# Patient Record
Sex: FEMALE | Race: WHITE | Employment: OTHER | URBAN - NONMETROPOLITAN AREA
[De-identification: names, ages, dates, MRNs, and addresses within clinical notes are randomized per-mention and may not be internally consistent; named-entity substitution may affect disease eponyms.]

---

## 2017-02-04 DIAGNOSIS — J30.9 ALLERGIC RHINITIS: ICD-10-CM

## 2017-02-06 RX ORDER — MONTELUKAST SODIUM 10 MG/1
TABLET ORAL
Qty: 90 TABLET | Refills: 3 | Status: SHIPPED | OUTPATIENT
Start: 2017-02-06 | End: 2018-01-13 | Stop reason: SDUPTHER

## 2017-02-14 ENCOUNTER — OFFICE VISIT (OUTPATIENT)
Dept: PRIMARY CARE CLINIC | Age: 62
End: 2017-02-14

## 2017-02-14 VITALS
DIASTOLIC BLOOD PRESSURE: 78 MMHG | BODY MASS INDEX: 32.3 KG/M2 | OXYGEN SATURATION: 98 % | HEIGHT: 66 IN | WEIGHT: 201 LBS | SYSTOLIC BLOOD PRESSURE: 116 MMHG | HEART RATE: 65 BPM | TEMPERATURE: 97.6 F

## 2017-02-14 DIAGNOSIS — B34.9 VIRAL ILLNESS: Primary | ICD-10-CM

## 2017-02-14 DIAGNOSIS — R11.0 NAUSEA: ICD-10-CM

## 2017-02-14 DIAGNOSIS — R05.9 COUGH: ICD-10-CM

## 2017-02-14 DIAGNOSIS — R50.9 FEVER AND CHILLS: ICD-10-CM

## 2017-02-14 LAB
INFLUENZA A ANTIBODY: NORMAL
INFLUENZA B ANTIBODY: NORMAL

## 2017-02-14 PROCEDURE — 99213 OFFICE O/P EST LOW 20 MIN: CPT | Performed by: FAMILY MEDICINE

## 2017-02-14 PROCEDURE — 87804 INFLUENZA ASSAY W/OPTIC: CPT | Performed by: FAMILY MEDICINE

## 2017-02-14 RX ORDER — ONDANSETRON 4 MG/1
4 TABLET, ORALLY DISINTEGRATING ORAL EVERY 8 HOURS PRN
Qty: 15 TABLET | Refills: 0 | Status: SHIPPED | OUTPATIENT
Start: 2017-02-14 | End: 2017-03-02 | Stop reason: ALTCHOICE

## 2017-02-14 RX ORDER — BENZONATATE 100 MG/1
100 CAPSULE ORAL EVERY 8 HOURS PRN
Qty: 30 CAPSULE | Refills: 0 | Status: SHIPPED | OUTPATIENT
Start: 2017-02-14 | End: 2017-03-02 | Stop reason: ALTCHOICE

## 2017-02-14 ASSESSMENT — ENCOUNTER SYMPTOMS
SINUS PRESSURE: 0
VOMITING: 1
SHORTNESS OF BREATH: 0
COUGH: 1
SORE THROAT: 1
WHEEZING: 0
DIARRHEA: 1
RHINORRHEA: 1
NAUSEA: 1

## 2017-03-02 ENCOUNTER — TELEPHONE (OUTPATIENT)
Dept: FAMILY MEDICINE CLINIC | Age: 62
End: 2017-03-02

## 2017-03-02 ENCOUNTER — OFFICE VISIT (OUTPATIENT)
Dept: ONCOLOGY | Age: 62
End: 2017-03-02

## 2017-03-02 VITALS
BODY MASS INDEX: 33.43 KG/M2 | HEART RATE: 80 BPM | WEIGHT: 208 LBS | SYSTOLIC BLOOD PRESSURE: 118 MMHG | TEMPERATURE: 98.1 F | DIASTOLIC BLOOD PRESSURE: 68 MMHG | HEIGHT: 66 IN

## 2017-03-02 DIAGNOSIS — C50.511 MALIGNANT NEOPLASM OF LOWER-OUTER QUADRANT OF RIGHT FEMALE BREAST (HCC): Primary | ICD-10-CM

## 2017-03-02 PROCEDURE — 99214 OFFICE O/P EST MOD 30 MIN: CPT | Performed by: INTERNAL MEDICINE

## 2017-03-02 RX ORDER — PREDNISONE 20 MG/1
40 TABLET ORAL DAILY
Qty: 10 TABLET | Refills: 0 | Status: SHIPPED | OUTPATIENT
Start: 2017-03-02 | End: 2017-03-07

## 2017-03-02 RX ORDER — LORATADINE 10 MG/1
10 TABLET ORAL DAILY
Qty: 30 TABLET | Refills: 0 | Status: SHIPPED | OUTPATIENT
Start: 2017-03-02 | End: 2017-05-09

## 2017-03-02 RX ORDER — FLUTICASONE PROPIONATE 50 MCG
1 SPRAY, SUSPENSION (ML) NASAL 2 TIMES DAILY
Qty: 1 BOTTLE | Refills: 0 | Status: SHIPPED | OUTPATIENT
Start: 2017-03-02 | End: 2017-10-23 | Stop reason: ALTCHOICE

## 2017-03-04 DIAGNOSIS — E78.5 HYPERLIPIDEMIA: ICD-10-CM

## 2017-03-07 RX ORDER — ATORVASTATIN CALCIUM 20 MG/1
TABLET, FILM COATED ORAL
Qty: 90 TABLET | Refills: 3 | Status: SHIPPED | OUTPATIENT
Start: 2017-03-07 | End: 2018-02-27 | Stop reason: SDUPTHER

## 2017-03-07 RX ORDER — LETROZOLE 2.5 MG/1
TABLET, FILM COATED ORAL
Qty: 90 TABLET | Refills: 2 | Status: SHIPPED | OUTPATIENT
Start: 2017-03-07 | End: 2017-11-13 | Stop reason: SDUPTHER

## 2017-03-28 DIAGNOSIS — F41.9 ANXIETY: Primary | ICD-10-CM

## 2017-03-29 RX ORDER — PAROXETINE HYDROCHLORIDE 20 MG/1
TABLET, FILM COATED ORAL
Qty: 90 TABLET | Refills: 3 | Status: SHIPPED | OUTPATIENT
Start: 2017-03-29 | End: 2017-09-21 | Stop reason: SDUPTHER

## 2017-05-09 ENCOUNTER — OFFICE VISIT (OUTPATIENT)
Dept: FAMILY MEDICINE CLINIC | Age: 62
End: 2017-05-09
Payer: COMMERCIAL

## 2017-05-09 ENCOUNTER — NURSE ONLY (OUTPATIENT)
Dept: LAB | Age: 62
End: 2017-05-09
Payer: COMMERCIAL

## 2017-05-09 VITALS
DIASTOLIC BLOOD PRESSURE: 62 MMHG | OXYGEN SATURATION: 95 % | HEART RATE: 72 BPM | WEIGHT: 207 LBS | BODY MASS INDEX: 31.37 KG/M2 | HEIGHT: 68 IN | SYSTOLIC BLOOD PRESSURE: 124 MMHG

## 2017-05-09 DIAGNOSIS — K64.4 RESIDUAL HEMORRHOIDAL SKIN TAGS: ICD-10-CM

## 2017-05-09 DIAGNOSIS — Z13.31 SCREENING FOR DEPRESSION: ICD-10-CM

## 2017-05-09 DIAGNOSIS — D48.5 NEOPLASM OF UNCERTAIN BEHAVIOR OF SKIN: Primary | ICD-10-CM

## 2017-05-09 DIAGNOSIS — K21.9 GASTROESOPHAGEAL REFLUX DISEASE, ESOPHAGITIS PRESENCE NOT SPECIFIED: ICD-10-CM

## 2017-05-09 DIAGNOSIS — Z23 NEED FOR TDAP VACCINATION: ICD-10-CM

## 2017-05-09 DIAGNOSIS — C50.511 MALIGNANT NEOPLASM OF LOWER-OUTER QUADRANT OF RIGHT FEMALE BREAST (HCC): ICD-10-CM

## 2017-05-09 DIAGNOSIS — E78.5 HYPERLIPIDEMIA, UNSPECIFIED HYPERLIPIDEMIA TYPE: ICD-10-CM

## 2017-05-09 PROBLEM — H40.1190 PRIMARY OPEN ANGLE GLAUCOMA: Status: ACTIVE | Noted: 2017-01-10

## 2017-05-09 PROBLEM — H25.10 NUCLEAR SCLEROTIC CATARACT: Status: ACTIVE | Noted: 2017-01-10

## 2017-05-09 PROCEDURE — 99214 OFFICE O/P EST MOD 30 MIN: CPT | Performed by: FAMILY MEDICINE

## 2017-05-09 PROCEDURE — 17003 DESTRUCT PREMALG LES 2-14: CPT | Performed by: FAMILY MEDICINE

## 2017-05-09 PROCEDURE — 90715 TDAP VACCINE 7 YRS/> IM: CPT | Performed by: FAMILY MEDICINE

## 2017-05-09 PROCEDURE — 90471 IMMUNIZATION ADMIN: CPT | Performed by: FAMILY MEDICINE

## 2017-05-09 PROCEDURE — G0444 DEPRESSION SCREEN ANNUAL: HCPCS | Performed by: FAMILY MEDICINE

## 2017-05-09 PROCEDURE — 17000 DESTRUCT PREMALG LESION: CPT | Performed by: FAMILY MEDICINE

## 2017-05-09 ASSESSMENT — ENCOUNTER SYMPTOMS
SHORTNESS OF BREATH: 0
NAUSEA: 0
VOMITING: 0
BLOOD IN STOOL: 0
CONSTIPATION: 0
COUGH: 1
DIARRHEA: 1

## 2017-05-09 ASSESSMENT — PATIENT HEALTH QUESTIONNAIRE - PHQ9
SUM OF ALL RESPONSES TO PHQ9 QUESTIONS 1 & 2: 1
SUM OF ALL RESPONSES TO PHQ QUESTIONS 1-9: 1
1. LITTLE INTEREST OR PLEASURE IN DOING THINGS: 1
2. FEELING DOWN, DEPRESSED OR HOPELESS: 0

## 2017-06-03 DIAGNOSIS — E78.5 HYPERLIPIDEMIA, UNSPECIFIED HYPERLIPIDEMIA TYPE: ICD-10-CM

## 2017-06-05 RX ORDER — FENOFIBRATE 145 MG/1
TABLET, COATED ORAL
Qty: 90 TABLET | Refills: 1 | Status: SHIPPED | OUTPATIENT
Start: 2017-06-05 | End: 2017-12-22 | Stop reason: SDUPTHER

## 2017-06-18 ENCOUNTER — TELEPHONE (OUTPATIENT)
Dept: GENERAL RADIOLOGY | Age: 62
End: 2017-06-18

## 2017-06-18 DIAGNOSIS — R92.8 ABNORMAL MAMMOGRAM: Primary | ICD-10-CM

## 2017-06-27 ENCOUNTER — OFFICE VISIT (OUTPATIENT)
Dept: ONCOLOGY | Age: 62
End: 2017-06-27
Payer: COMMERCIAL

## 2017-06-27 VITALS
TEMPERATURE: 97.7 F | HEART RATE: 72 BPM | DIASTOLIC BLOOD PRESSURE: 62 MMHG | SYSTOLIC BLOOD PRESSURE: 98 MMHG | WEIGHT: 205 LBS | HEIGHT: 66 IN | BODY MASS INDEX: 32.95 KG/M2

## 2017-06-27 DIAGNOSIS — C50.511 MALIGNANT NEOPLASM OF LOWER-OUTER QUADRANT OF RIGHT FEMALE BREAST (HCC): Primary | ICD-10-CM

## 2017-06-27 PROCEDURE — 99214 OFFICE O/P EST MOD 30 MIN: CPT | Performed by: INTERNAL MEDICINE

## 2017-09-21 RX ORDER — PAROXETINE HYDROCHLORIDE 20 MG/1
20 TABLET, FILM COATED ORAL EVERY MORNING
Qty: 90 TABLET | Refills: 3 | Status: SHIPPED | OUTPATIENT
Start: 2017-09-21 | End: 2018-09-01 | Stop reason: SDUPTHER

## 2017-10-23 ENCOUNTER — HOSPITAL ENCOUNTER (OUTPATIENT)
Dept: LAB | Age: 62
Setting detail: SPECIMEN
Discharge: HOME | End: 2017-10-23
Payer: COMMERCIAL

## 2017-10-23 ENCOUNTER — OFFICE VISIT (OUTPATIENT)
Dept: ONCOLOGY | Age: 62
End: 2017-10-23
Payer: COMMERCIAL

## 2017-10-23 VITALS
TEMPERATURE: 98.2 F | BODY MASS INDEX: 33.59 KG/M2 | DIASTOLIC BLOOD PRESSURE: 64 MMHG | HEIGHT: 66 IN | WEIGHT: 209 LBS | SYSTOLIC BLOOD PRESSURE: 115 MMHG | RESPIRATION RATE: 16 BRPM

## 2017-10-23 DIAGNOSIS — C50.511 MALIGNANT NEOPLASM OF LOWER-OUTER QUADRANT OF RIGHT BREAST OF FEMALE, ESTROGEN RECEPTOR POSITIVE (HCC): Primary | ICD-10-CM

## 2017-10-23 DIAGNOSIS — Z17.0 MALIGNANT NEOPLASM OF LOWER-OUTER QUADRANT OF RIGHT BREAST OF FEMALE, ESTROGEN RECEPTOR POSITIVE (HCC): Primary | ICD-10-CM

## 2017-10-23 DIAGNOSIS — C50.511 MALIGNANT NEOPLASM OF LOWER-OUTER QUADRANT OF RIGHT FEMALE BREAST (HCC): ICD-10-CM

## 2017-10-23 LAB
ABSOLUTE EOS #: 0.2 K/UL (ref 0–0.4)
ABSOLUTE IMMATURE GRANULOCYTE: NORMAL K/UL (ref 0–0.3)
ABSOLUTE LYMPH #: 1.8 K/UL (ref 1–4.8)
ABSOLUTE MONO #: 0.5 K/UL (ref 0.1–1.2)
ALBUMIN SERPL-MCNC: 4.3 G/DL (ref 3.5–5.2)
ALBUMIN/GLOBULIN RATIO: 1.6 (ref 1–2.5)
ALP BLD-CCNC: 116 U/L (ref 35–104)
ALT SERPL-CCNC: 15 U/L (ref 5–33)
ANION GAP SERPL CALCULATED.3IONS-SCNC: 13 MMOL/L (ref 9–17)
AST SERPL-CCNC: 18 U/L
BASOPHILS # BLD: 1 % (ref 0–1)
BASOPHILS ABSOLUTE: 0 K/UL (ref 0–0.2)
BILIRUB SERPL-MCNC: 0.35 MG/DL (ref 0.3–1.2)
BUN BLDV-MCNC: 12 MG/DL (ref 8–23)
BUN/CREAT BLD: 16 (ref 9–20)
CALCIUM SERPL-MCNC: 9.7 MG/DL (ref 8.6–10.4)
CHLORIDE BLD-SCNC: 104 MMOL/L (ref 98–107)
CO2: 27 MMOL/L (ref 20–31)
CREAT SERPL-MCNC: 0.75 MG/DL (ref 0.5–0.9)
DIFFERENTIAL TYPE: NORMAL
EOSINOPHILS RELATIVE PERCENT: 5 % (ref 1–7)
GFR AFRICAN AMERICAN: >60 ML/MIN
GFR NON-AFRICAN AMERICAN: >60 ML/MIN
GFR SERPL CREATININE-BSD FRML MDRD: ABNORMAL ML/MIN/{1.73_M2}
GFR SERPL CREATININE-BSD FRML MDRD: ABNORMAL ML/MIN/{1.73_M2}
GLUCOSE BLD-MCNC: 103 MG/DL (ref 70–99)
HCT VFR BLD CALC: 37.2 % (ref 36–46)
HEMOGLOBIN: 12.4 G/DL (ref 12–16)
IMMATURE GRANULOCYTES: NORMAL %
LACTATE DEHYDROGENASE: 166 U/L (ref 135–214)
LYMPHOCYTES # BLD: 37 % (ref 16–46)
MCH RBC QN AUTO: 28.5 PG (ref 26–34)
MCHC RBC AUTO-ENTMCNC: 33.3 G/DL (ref 31–37)
MCV RBC AUTO: 85.6 FL (ref 80–100)
MONOCYTES # BLD: 10 % (ref 4–11)
PDW BLD-RTO: 13.8 % (ref 11–14.5)
PLATELET # BLD: 252 K/UL (ref 140–450)
PLATELET ESTIMATE: NORMAL
PMV BLD AUTO: 7.7 FL (ref 6–12)
POTASSIUM SERPL-SCNC: 3.7 MMOL/L (ref 3.7–5.3)
RBC # BLD: 4.35 M/UL (ref 4–5.2)
RBC # BLD: NORMAL 10*6/UL
SEG NEUTROPHILS: 47 % (ref 43–77)
SEGMENTED NEUTROPHILS ABSOLUTE COUNT: 2.3 K/UL (ref 1.8–7.7)
SODIUM BLD-SCNC: 144 MMOL/L (ref 135–144)
TOTAL PROTEIN: 7 G/DL (ref 6.4–8.3)
WBC # BLD: 4.8 K/UL (ref 3.5–11)
WBC # BLD: NORMAL 10*3/UL

## 2017-10-23 PROCEDURE — 83615 LACTATE (LD) (LDH) ENZYME: CPT

## 2017-10-23 PROCEDURE — 99214 OFFICE O/P EST MOD 30 MIN: CPT | Performed by: INTERNAL MEDICINE

## 2017-10-23 PROCEDURE — 80053 COMPREHEN METABOLIC PANEL: CPT

## 2017-10-23 PROCEDURE — 85025 COMPLETE CBC W/AUTO DIFF WBC: CPT

## 2017-10-23 PROCEDURE — 36415 COLL VENOUS BLD VENIPUNCTURE: CPT

## 2017-10-23 RX ORDER — ATORVASTATIN CALCIUM 10 MG/1
10 TABLET, FILM COATED ORAL
COMMUNITY
End: 2017-10-23 | Stop reason: DRUGHIGH

## 2017-10-23 RX ORDER — LATANOPROST 50 UG/ML
SOLUTION/ DROPS OPHTHALMIC
COMMUNITY
Start: 2017-07-11 | End: 2018-05-14 | Stop reason: ALTCHOICE

## 2017-10-23 RX ORDER — FENOFIBRATE 145 MG/1
145 TABLET, COATED ORAL
COMMUNITY
End: 2017-10-23 | Stop reason: SDUPTHER

## 2017-10-23 RX ORDER — MONTELUKAST SODIUM 10 MG/1
10 TABLET ORAL
COMMUNITY
End: 2017-10-23 | Stop reason: SDUPTHER

## 2017-10-23 RX ORDER — CETIRIZINE HYDROCHLORIDE 10 MG/1
5 TABLET ORAL
COMMUNITY
End: 2017-10-23 | Stop reason: SDUPTHER

## 2017-10-23 NOTE — PROGRESS NOTES
Patient ID: Jimmie Peralta, [birth year], [id number], 57 y.o. Diagnosis:   Right-sided breast cancer,T1cN0, status post lumpectomy with sentinel lymph node biopsy in December 2015  Adjuvant radiation therapy completed in February 2016  Started on Femara March 2016  HISTORY OF PRESENT ILLNESS:    Oncologic History: This is a 58-year-old female who was diagnosed with right-sided breast cancer in November 2015 and subsequently had lumpectomy with sentinel lymph node biopsy in December 2015. The pathology was positive for well-differentiated ductal carcinoma measuring 1.6 cm with low-grade ductal carcinoma in situ. There was also atypical lobular hyperplasia and lobular carcinoma in situ noted. Final surgical pathology showed P T1cp N0 disease, ER/NM positive and HER-2/александр negative. Patient completed radiation therapy in February 2016 and started on endocrine therapy with Femara in March 2016. Interval history:  Patient is returning for follow-up visit. She started Femara well. She reports that during summertime hot flashes are worse but now they're getting better. She denied any new lumps or bumps in her breast.  No unintentional weight loss, new bone pain or back pain. During this visit patient's allergy, social, medical, surgical history and medications were reviewed and updated. Past Medical History:   Diagnosis Date    Cancer of right female breast ([address]     infiltrating ductal carcinoma T10 N0 M0 ER positive    Depression     GERD (gastroesophageal reflux disease)     Glaucoma     Dr Dona Garcia History of gastroenteritis 1994    Hospitalized for gastroenteritis and dehydration.  Hyperlipidemia     Hypertension     Controlled with diet and exercise.  OA (osteoarthritis)     Seasonal allergies      Past Surgical History:   Procedure Laterality Date    BREAST SURGERY Right 12/14/2015    lumpectomy with sentinal node biopsy    BUNIONECTOMY      Remote.     COLONOSCOPY 1/28/2013    Grade 1 internal hemorrhoids. Otherwise normal.    DILATION AND CURETTAGE OF UTERUS  12/29/2005    Hysteroscopy.  HEMORRHOID SURGERY      rubber banding by Dr. Rose Denson 10/28/16    KNEE ARTHROSCOPY Right 7/26/2002    Partial medial meniscectomy.  MENISCECTOMY Right 7/2002    Medial.    OR SONO GUIDE NEEDLE BIOPSY Right 11/20/2015    Right breast    TOE OSTEOTOMY Right 2009    Metatarsal osteotomy of right 1st digit.  TOTAL KNEE ARTHROPLASTY Left 11/9/2011    TOTAL KNEE ARTHROPLASTY Right 12/3/2010    UPPER GASTROINTESTINAL ENDOSCOPY  2/2004       No Known Allergies    Current Outpatient Prescriptions   Medication Sig Dispense Refill    latanoprost (XALATAN) 0.005 % ophthalmic solution Apply to eye      CALCIUM CITRATE-VITAMIN D PO Take 4 tablets by mouth daily      PARoxetine (PAXIL) 20 MG tablet Take 1 tablet by mouth every morning 90 tablet 3    fenofibrate (TRICOR) 145 MG tablet TAKE 1 TABLET DAILY 90 tablet 1    atorvastatin (LIPITOR) 20 MG tablet TAKE 1 TABLET DAILY 90 tablet 3    letrozole (FEMARA) 2.5 MG tablet TAKE 1 TABLET DAILY 90 tablet 2    montelukast (SINGULAIR) 10 MG tablet TAKE 1 TABLET NIGHTLY 90 tablet 3    timolol (TIMOPTIC) 0.5 % ophthalmic solution       omeprazole (PRILOSEC) 20 MG capsule Take 1 capsule by mouth daily. 90 capsule 1    cetirizine (ZYRTEC) 10 MG tablet Take 10 mg by mouth daily.  hydrocortisone (ANUSOL-HC) 2.5 % rectal cream Place rectally 2 times daily x 10 days, then as needed. 1 Tube 2     No current facility-administered medications for this visit. Social History     Social History    Marital status:      Spouse name: N/A    Number of children: N/A    Years of education: N/A     Occupational History    Not on file.      Social History Main Topics    Smoking status: Never Smoker    Smokeless tobacco: Never Used    Alcohol use No    Drug use: No    Sexual activity: Not on file     Other Topics Concern    Not on file     Social History Narrative    No narrative on file       Family History   Problem Relation Age of Onset    Dementia Mother     Breast Cancer Sister      REVIEW OF SYSTEM:   Constitutional: No fever or chills. No night sweats, no weight loss   Eyes: No eye discharge, double vision, or eye pain   HEENT: negative for sore mouth, sore throat, hoarseness and voice change   Respiratory: negative for cough , sputum, dyspnea, wheezing, hemoptysis, chest pain   Cardiovascular: negative for chest pain, dyspnea, palpitations, orthopnea, PND   Gastrointestinal: negative for nausea, vomiting, diarrhea, constipation, abdominal pain, Dysphagia, hematemesis and hematochezia   Genitourinary: negative for frequency, dysuria, nocturia, urinary incontinence, and hematuria   Integument: negative for rash, skin lesions, bruises.    Hematologic/Lymphatic: negative for easy bruising, bleeding, lymphadenopathy, petechiae and swelling/edema   Endocrine: negative for heat or cold intolerance, tremor, weight changes, change in bowel habits and hair loss   Musculoskeletal: negative for myalgias, arthralgias, pain, joint swelling,and bone pain   Neurological: negative for headaches, dizziness, seizures, weakness, numbness     OBJECTIVE:         Vitals:    10/23/17 0923   BP: 115/64   Resp: 16   Temp: 98.2 °F (36.8 °C)       PHYSICAL EXAM:   General appearance - well appearing, no in pain or distress   Mental status - alert and cooperative   Eyes - pupils equal and reactive, extraocular eye movements intact   Ears - bilateral TM's and external ear canals normal   Mouth - mucous membranes moist, pharynx normal without lesions   Neck - supple, no significant adenopathy   Lymphatics - no palpable lymphadenopathy, no hepatosplenomegaly   Chest - clear to auscultation, no wheezes, rales or rhonchi, symmetric air entry   Heart - normal rate, regular rhythm, normal S1, S2, no murmurs, rubs, clicks or gallops   Abdomen - soft, nontender, nondistended, no masses or organomegaly   Neurological - alert, oriented, normal speech, no focal findings or movement disorder noted   Musculoskeletal - no joint tenderness, deformity or swelling   Extremities - peripheral pulses normal, no pedal edema, no clubbing or cyanosis   Skin - normal coloration and turgor, no rashes, no suspicious skin lesions noted ,      LABORATORY DATA:     Lab Results   Component Value Date    WBC 4.8 10/23/2017    HGB 12.4 10/23/2017    HCT 37.2 10/23/2017    MCV 85.6 10/23/2017     10/23/2017    LYMPHOPCT 37 10/23/2017    RBC 4.35 10/23/2017    MCH 28.5 10/23/2017    MCHC 33.3 10/23/2017    RDW 13.8 10/23/2017    MONOPCT 10 10/23/2017    BASOPCT 1 10/23/2017    NEUTROABS 2.30 10/23/2017    LYMPHSABS 1.80 10/23/2017    MONOSABS 0.50 10/23/2017    EOSABS 0.20 10/23/2017    BASOSABS 0.00 10/23/2017         Chemistry        Component Value Date/Time     (H) 06/27/2017 0907    K 3.5 (L) 06/27/2017 0907     06/27/2017 0907    CO2 27 06/27/2017 0907    BUN 12 06/27/2017 0907    CREATININE 0.82 06/27/2017 0907        Component Value Date/Time    CALCIUM 9.8 06/27/2017 0907    ALKPHOS 124 (H) 06/27/2017 0907    AST 19 06/27/2017 0907    ALT 17 06/27/2017 0907    BILITOT 0.29 (L) 06/27/2017 0907        PATHOLOGY DATA:   Collected: 12/14/2015   Received: 12/15/2015   Reported: 12/16/2015 14:25     -- Diagnosis --   1.  RIGHT BREAST, EXCISIONAL LUMPECTOMY WITH WIRE LOCALIZATION:       -  INVASIVE, WELL-DIFFERENTIATED DUCTAL CARCINOMA (1.6 CM).  SEE     COMMENT. -  FOCAL LOW GRADE DUCTAL CARCINOMA IN SITU IS ASSOCIATED WITH THE     INVASIVE DUCTAL CARCINOMA. -  ATYPICAL LOBULAR HYPERPLASIA AND LOBULAR CARCINOMA IN SITU. -  THE SURGICAL MARGINS ARE NEGATIVE FOR INVASIVE DUCTAL     CARCINOMA AND DCIS (THE CLOSEST MARGIN IS 0.5 CM FROM THE TUMOR). 2.  SENTINEL LYMPH NODE, EXCISIONAL BIOPSY:       -  NEGATIVE FOR MALIGNANCY (0/1).      3.  SENTINEL LYMPH NODE, EXCISIONAL BIOPSY:       -  NEGATIVE FOR MALIGNANCY (0/1). 4.  SENTINEL LYMPH NODE, EXCISIONAL BIOPSY:       -  NEGATIVE FOR MALIGNANCY (0/1). IMAGING DATA:    Digital diagnostic mammogram 6/23/17   Impression   No mammographic evidence of malignancy.       BIRADS:   BIRADS - CATEGORY 2       Benign, no evidence of malignancy.  Diagnostic right breast mammogram in 6   months is recommended to complete the patient's 2 year post lumpectomy   protocol.         ASSESSMENT:    This is a 50-year-old female with right-sided breast cancer status post lumpectomy followed by radiation therapy and currently on adjuvant endocrine therapy since March 2016. She is tolerating Femara very well except mild hot flashes which seems to be getting better. At this point there is no evidence of recurrence. I discussed the risk-benefit and she will continue endocrine therapy for 5-10 years. She will be scheduled for right sided mammogram in February 2018.      PLAN:   She is scheduled for right sided mammogram in February  Return to clinic in February with CBC and CMP  Continue Femara      Kris Palacio MD  Hematology/Oncology

## 2017-10-25 ENCOUNTER — OFFICE VISIT (OUTPATIENT)
Dept: FAMILY MEDICINE CLINIC | Age: 62
End: 2017-10-25
Payer: COMMERCIAL

## 2017-10-25 VITALS
HEART RATE: 74 BPM | WEIGHT: 209 LBS | DIASTOLIC BLOOD PRESSURE: 68 MMHG | SYSTOLIC BLOOD PRESSURE: 102 MMHG | OXYGEN SATURATION: 96 % | TEMPERATURE: 98.2 F | HEIGHT: 66 IN | BODY MASS INDEX: 33.59 KG/M2

## 2017-10-25 DIAGNOSIS — S93.492A SPRAIN OF OTHER LIGAMENT OF LEFT ANKLE, INITIAL ENCOUNTER: Primary | ICD-10-CM

## 2017-10-25 PROCEDURE — L4350 ANKLE CONTROL ORTHO PRE OTS: HCPCS | Performed by: FAMILY MEDICINE

## 2017-10-25 PROCEDURE — 99213 OFFICE O/P EST LOW 20 MIN: CPT | Performed by: FAMILY MEDICINE

## 2017-10-25 NOTE — PROGRESS NOTES
Ivan Hinkle was told to start with pedal pumps and then write the alphabet with her foot. Ivan Hinkle was also given additional exercises through the patient instructions. Return if symptoms worsen or fail to improve. Patient given educational materials - see patient instructions. Discussed use, benefit, and side effects of prescribed medications. All patient questions answered. Pt voiced understanding. Reviewed health maintenance. Instructed to continue current medications, diet and exercise. Patient agreed with treatment plan. Follow up as directed.      Electronically signed by Juanito Pelaez MD on 10/25/2017 at 12:13 PM

## 2017-10-25 NOTE — PATIENT INSTRUCTIONS
hand to hold on to a chair, counter, or wall. 2. Standing on the leg with your affected ankle, keep that knee straight. Try to balance on that leg for up to 30 seconds. Then rest for up to 10 seconds. 3. Repeat 6 to 8 times. 4. When you can balance on your affected leg for 30 seconds with your eyes open, try to balance on it with your eyes closed. When you can do this exercise with your eyes closed for 30 seconds and with ease and no pain, try standing on a pillow or piece of foam, and repeat steps 1 through 4. Follow-up care is a key part of your treatment and safety. Be sure to make and go to all appointments, and call your doctor if you are having problems. It's also a good idea to know your test results and keep a list of the medicines you take. Where can you learn more? Go to https://[email or web address]. org and sign in to your [employer] account. Enter Ananya Barrios in the Mobile [employer] box to learn more about \"Ankle Sprain: Rehab Exercises. \"     If you do not have an account, please click on the \"Sign Up Now\" link. Current as of: March 21, 2017  Content Version: 11.3  © 3657-7822 [employer]. Care instructions adapted under license by [facility] ([facility]). If you have questions about a medical condition or this instruction, always ask your healthcare professional. [place] [address] any warranty or liability for your use of this information.

## 2017-11-14 ENCOUNTER — OFFICE VISIT (OUTPATIENT)
Dept: FAMILY MEDICINE CLINIC | Age: 62
End: 2017-11-14
Payer: COMMERCIAL

## 2017-11-14 VITALS
OXYGEN SATURATION: 98 % | SYSTOLIC BLOOD PRESSURE: 126 MMHG | BODY MASS INDEX: 33.25 KG/M2 | DIASTOLIC BLOOD PRESSURE: 66 MMHG | HEART RATE: 64 BPM | WEIGHT: 206 LBS

## 2017-11-14 DIAGNOSIS — Z17.0 MALIGNANT NEOPLASM OF LOWER-OUTER QUADRANT OF RIGHT BREAST OF FEMALE, ESTROGEN RECEPTOR POSITIVE (HCC): ICD-10-CM

## 2017-11-14 DIAGNOSIS — K64.9 HEMORRHOIDS, UNSPECIFIED HEMORRHOID TYPE: ICD-10-CM

## 2017-11-14 DIAGNOSIS — E78.5 HYPERLIPIDEMIA, UNSPECIFIED HYPERLIPIDEMIA TYPE: Primary | ICD-10-CM

## 2017-11-14 DIAGNOSIS — K21.9 GASTROESOPHAGEAL REFLUX DISEASE, ESOPHAGITIS PRESENCE NOT SPECIFIED: ICD-10-CM

## 2017-11-14 DIAGNOSIS — C50.511 MALIGNANT NEOPLASM OF LOWER-OUTER QUADRANT OF RIGHT BREAST OF FEMALE, ESTROGEN RECEPTOR POSITIVE (HCC): ICD-10-CM

## 2017-11-14 PROCEDURE — 99214 OFFICE O/P EST MOD 30 MIN: CPT | Performed by: FAMILY MEDICINE

## 2017-11-14 RX ORDER — LETROZOLE 2.5 MG/1
TABLET, FILM COATED ORAL
Qty: 90 TABLET | Refills: 1 | Status: SHIPPED | OUTPATIENT
Start: 2017-11-14 | End: 2018-03-24 | Stop reason: SDUPTHER

## 2017-11-14 ASSESSMENT — ENCOUNTER SYMPTOMS
BLOOD IN STOOL: 0
CONSTIPATION: 0
VOMITING: 0
WHEEZING: 0
DIARRHEA: 1
NAUSEA: 0
SHORTNESS OF BREATH: 0

## 2017-11-14 NOTE — PROGRESS NOTES
[facility]  [address], [address]  [phone]      Monae Dempsey is a 57 y.o. female who presents today for her medical conditions/complaints as noted below. Monae Dempsey is c/o of Hyperlipidemia (6 month follow-up) and Gastroesophageal Reflux (6 month follow-up)      HPI:     Pt here today for follow-up of HLD and GERD.     Pt still has intermittent b/l hip pain, R > L (posterior) - worse with repetitive bending and pulling, like with weeding/yardwork. Uses heating pad when it flares up; will use Ibuprofen as needed, if it gets \"really bad\". Also learned some stretches from the chiropractor for her hips, and hoping to start doing more formal exercise again this winter (weights, stationary bike).     BP well-controlled today - 126/66.     Seeing Dr. Shavon Del Valle every 4 months for h/o breast CA. Next appt in 2/2018. No current concerns. Taking Femara 2.5 mg daily - stable on this; Dr. Shavon Del Valle has spoken with her about taking this for 5-10 years total.   Will have R-sided mammogram done in 2/2018.     Taking Lipitor 20 mg daily and Tricor 145 mg daily for HLD - due for next lipid panel in 2/2018. States she is compliant with meds. Still trying to limit her soda, and has cut out all sweet tea; increasing her water intake.       Taking Zyrtec 10 mg daily and Singulair 10 mg nightly for allergic rhinitis - stable on these. Uses Flonase only sparingly as needed when she is sick.     Taking Paxil 20 mg daily for depression - feels this is keeping her mood well-controlled. Compliant with this daily. Denies SI or side effects.     Taking OTC Prilosec 20 mg daily for GERD - stable on this; tried weaning off this in the past, but had fairly immediate return of heartburn.     Seeing Dr. Jose Carlos Smith every 6 months for glaucoma - taking 2 different eye drops currently BID; stable.     Skin lesions that were treated at last OV with cryotherapy have resolved. Pt still having intermittent hemorrhoid symptoms - no recent bleeding. Has more pain/irritation from chafing in the rectal/gluteal area from sweating during her hot flashes and increased moisture in the area. Has been using Anusol cream as needed for itching; also using Goldbond anti-fungal powder on the area and keeping the area more dry. Past Medical History:   Diagnosis Date    Cancer of right female breast ([address]     infiltrating ductal carcinoma T10 N0 M0 ER positive    Depression     GERD (gastroesophageal reflux disease)     Glaucoma     Dr Greg Grider History of gastroenteritis 1994    Hospitalized for gastroenteritis and dehydration.  Hyperlipidemia     Hypertension     Controlled with diet and exercise.  OA (osteoarthritis)     Seasonal allergies       Past Surgical History:   Procedure Laterality Date    BREAST SURGERY Right 12/14/2015    lumpectomy with sentinal node biopsy    BUNIONECTOMY      Remote.  COLONOSCOPY  1/28/2013    Grade 1 internal hemorrhoids. Otherwise normal.    DILATION AND CURETTAGE OF UTERUS  12/29/2005    Hysteroscopy.  HEMORRHOID SURGERY      rubber banding by Dr. Kinsey Guard 10/28/16    KNEE ARTHROSCOPY Right 7/26/2002    Partial medial meniscectomy.  MENISCECTOMY Right 7/2002    Medial.    OK SONO GUIDE NEEDLE BIOPSY Right 11/20/2015    Right breast    TOE OSTEOTOMY Right 2009    Metatarsal osteotomy of right 1st digit.  TOTAL KNEE ARTHROPLASTY Left 11/9/2011    TOTAL KNEE ARTHROPLASTY Right 12/3/2010    UPPER GASTROINTESTINAL ENDOSCOPY  2/2004     Family History   Problem Relation Age of Onset    Dementia Mother     Breast Cancer Sister      Social History   Substance Use Topics    Smoking status: Never Smoker    Smokeless tobacco: Never Used    Alcohol use No      Current Outpatient Prescriptions   Medication Sig Dispense Refill    hydrocortisone (ANUSOL-HC) 2.5 % rectal cream Place rectally 2 times daily as needed.  1 Tube 2    latanoprost (XALATAN) 0.005 % ophthalmic solution Apply to eye      CALCIUM CITRATE-VITAMIN D PO Take 4 tablets by mouth daily      PARoxetine (PAXIL) 20 MG tablet Take 1 tablet by mouth every morning 90 tablet 3    fenofibrate (TRICOR) 145 MG tablet TAKE 1 TABLET DAILY 90 tablet 1    atorvastatin (LIPITOR) 20 MG tablet TAKE 1 TABLET DAILY 90 tablet 3    montelukast (SINGULAIR) 10 MG tablet TAKE 1 TABLET NIGHTLY 90 tablet 3    timolol (TIMOPTIC) 0.5 % ophthalmic solution       omeprazole (PRILOSEC) 20 MG capsule Take 1 capsule by mouth daily. 90 capsule 1    cetirizine (ZYRTEC) 10 MG tablet Take 10 mg by mouth daily.  letrozole (FEMARA) 2.5 MG tablet TAKE 1 TABLET DAILY 90 tablet 1     No current facility-administered medications for this visit. No Known Allergies    Health Maintenance   Topic Date Due    HIV screen  12/06/1970    Breast cancer screen  06/23/2018    Diabetes screen  03/22/2019    Cervical cancer screen  11/04/2021    Lipid screen  01/17/2022    Colon cancer screen colonoscopy  01/28/2023    DTaP/Tdap/Td vaccine (2 - Td) 05/09/2027    Zostavax vaccine  Completed    Flu vaccine  Completed    Hepatitis C screen  Completed       Subjective:      Review of Systems   Constitutional: Negative for unexpected weight change. HENT: Negative for hearing loss. Eyes: Positive for visual disturbance (has cataract in both eyes, R > L). Respiratory: Negative for shortness of breath and wheezing. Cardiovascular: Negative for chest pain, palpitations and leg swelling. Gastrointestinal: Positive for diarrhea (intermittent). Negative for blood in stool, constipation, nausea and vomiting. Genitourinary: Negative for dysuria and hematuria. Neurological: Negative for dizziness and light-headedness. Psychiatric/Behavioral: Negative for dysphoric mood and suicidal ideas. The patient is not nervous/anxious.         Objective:     Vitals:    11/14/17 1131   BP: 126/66   Site: Left Arm   Position: Sitting   Cuff Size: Large Adult   Pulse: 64   SpO2: 98%   Weight: 206 lb (93.4 kg)     Physical Exam   Constitutional: She is oriented to person, place, and time. She appears well-developed and well-nourished. No distress. HENT:   Head: Normocephalic and atraumatic. Right Ear: Tympanic membrane, external ear and ear canal normal.   Left Ear: Tympanic membrane, external ear and ear canal normal.   Nose: Nose normal.   Mouth/Throat: Oropharynx is clear and moist and mucous membranes are normal. No oropharyngeal exudate. Eyes: Conjunctivae and EOM are normal. Pupils are equal, round, and reactive to light. Neck: Neck supple. Cardiovascular: Normal rate, regular rhythm and normal heart sounds. Pulmonary/Chest: Effort normal and breath sounds normal. No respiratory distress. Abdominal: Soft. Bowel sounds are normal. She exhibits no distension. There is no tenderness. Musculoskeletal: She exhibits no edema. Neurological: She is alert and oriented to person, place, and time. Skin: Skin is warm and dry. Psychiatric: She has a normal mood and affect. Assessment:      1. Hyperlipidemia, unspecified hyperlipidemia type     2. Hemorrhoids, unspecified hemorrhoid type  hydrocortisone (ANUSOL-HC) 2.5 % rectal cream   3. Gastroesophageal reflux disease, esophagitis presence not specified     4. Malignant neoplasm of lower-outer quadrant of right breast of female, estrogen receptor positive ([facility] [address]           Plan:      Return in about 6 months (around 5/14/2018) for Follow-up. Orders Placed This Encounter   Medications    hydrocortisone (ANUSOL-HC) 2.5 % rectal cream     Sig: Place rectally 2 times daily as needed. Dispense:  1 Tube     Refill:  2       Patient given educational materials - see patient instructions. Discussed use, benefit, and side effects of prescribed medications. All patient questions answered. Pt voiced understanding. Reviewed health maintenance.

## 2017-12-22 DIAGNOSIS — E78.5 HYPERLIPIDEMIA, UNSPECIFIED HYPERLIPIDEMIA TYPE: ICD-10-CM

## 2017-12-26 RX ORDER — FENOFIBRATE 145 MG/1
TABLET, COATED ORAL
Qty: 90 TABLET | Refills: 1 | Status: SHIPPED | OUTPATIENT
Start: 2017-12-26 | End: 2018-06-30 | Stop reason: SDUPTHER

## 2017-12-26 NOTE — TELEPHONE ENCOUNTER
Last Appt:  11/14/2017  Next Appt:   5/14/2018  Med verified in Epic    Please advise for KB's absence.

## 2018-01-13 DIAGNOSIS — J30.9 ALLERGIC RHINITIS: ICD-10-CM

## 2018-01-13 DIAGNOSIS — E78.5 HYPERLIPIDEMIA: ICD-10-CM

## 2018-01-15 RX ORDER — MONTELUKAST SODIUM 10 MG/1
TABLET ORAL
Qty: 90 TABLET | Refills: 3 | Status: SHIPPED | OUTPATIENT
Start: 2018-01-15 | End: 2019-04-19

## 2018-01-15 RX ORDER — ATORVASTATIN CALCIUM 20 MG/1
TABLET, FILM COATED ORAL
Qty: 90 TABLET | Refills: 1 | OUTPATIENT
Start: 2018-01-15

## 2018-01-15 NOTE — TELEPHONE ENCOUNTER
Last appt 11-14-17. Next appt 5-14-18. Coming in 2-26-18 for oncology labs. Note added to draw for lipids also.

## 2018-01-24 ENCOUNTER — TELEPHONE (OUTPATIENT)
Dept: SURGERY | Age: 63
End: 2018-01-24

## 2018-01-24 DIAGNOSIS — C50.511 MALIGNANT NEOPLASM OF LOWER-OUTER QUADRANT OF RIGHT FEMALE BREAST, UNSPECIFIED ESTROGEN RECEPTOR STATUS (HCC): Primary | ICD-10-CM

## 2018-01-31 ENCOUNTER — HOSPITAL ENCOUNTER (OUTPATIENT)
Dept: MAMMOGRAPHY | Age: 63
Discharge: HOME | End: 2018-02-02
Payer: COMMERCIAL

## 2018-01-31 DIAGNOSIS — C50.511 MALIGNANT NEOPLASM OF LOWER-OUTER QUADRANT OF RIGHT FEMALE BREAST, UNSPECIFIED ESTROGEN RECEPTOR STATUS (HCC): ICD-10-CM

## 2018-01-31 PROCEDURE — G0279 TOMOSYNTHESIS, MAMMO: HCPCS

## 2018-02-07 ENCOUNTER — HOSPITAL ENCOUNTER (OUTPATIENT)
Dept: LAB | Age: 63
Setting detail: SPECIMEN
Discharge: HOME | End: 2018-02-07
Payer: COMMERCIAL

## 2018-02-07 ENCOUNTER — OFFICE VISIT (OUTPATIENT)
Dept: FAMILY MEDICINE CLINIC | Age: 63
End: 2018-02-07
Payer: COMMERCIAL

## 2018-02-07 ENCOUNTER — HOSPITAL ENCOUNTER (OUTPATIENT)
Dept: GENERAL RADIOLOGY | Age: 63
Discharge: HOME | End: 2018-02-09
Payer: COMMERCIAL

## 2018-02-07 ENCOUNTER — HOSPITAL ENCOUNTER (OUTPATIENT)
Dept: NON INVASIVE DIAGNOSTICS | Age: 63
Discharge: HOME | End: 2018-02-07
Payer: COMMERCIAL

## 2018-02-07 VITALS
DIASTOLIC BLOOD PRESSURE: 80 MMHG | BODY MASS INDEX: 33.91 KG/M2 | OXYGEN SATURATION: 98 % | HEIGHT: 66 IN | HEART RATE: 76 BPM | SYSTOLIC BLOOD PRESSURE: 128 MMHG | WEIGHT: 211 LBS

## 2018-02-07 DIAGNOSIS — Z01.818 PRE-OP EXAM: ICD-10-CM

## 2018-02-07 DIAGNOSIS — H25.9 SENILE CATARACT OF LEFT EYE, UNSPECIFIED AGE-RELATED CATARACT TYPE: ICD-10-CM

## 2018-02-07 DIAGNOSIS — Z01.818 PRE-OP EXAM: Primary | ICD-10-CM

## 2018-02-07 LAB
-: ABNORMAL
ABSOLUTE EOS #: 0.3 K/UL (ref 0–0.4)
ABSOLUTE IMMATURE GRANULOCYTE: NORMAL K/UL (ref 0–0.3)
ABSOLUTE LYMPH #: 2 K/UL (ref 1–4.8)
ABSOLUTE MONO #: 0.5 K/UL (ref 0.1–1.2)
ALBUMIN SERPL-MCNC: 4.5 G/DL (ref 3.5–5.2)
ALBUMIN/GLOBULIN RATIO: 1.5 (ref 1–2.5)
ALP BLD-CCNC: 104 U/L (ref 35–104)
ALT SERPL-CCNC: 18 U/L (ref 5–33)
AMORPHOUS: ABNORMAL
ANION GAP SERPL CALCULATED.3IONS-SCNC: 12 MMOL/L (ref 9–17)
AST SERPL-CCNC: 21 U/L
BACTERIA: ABNORMAL
BASOPHILS # BLD: 1 % (ref 0–1)
BASOPHILS ABSOLUTE: 0 K/UL (ref 0–0.2)
BILIRUB SERPL-MCNC: 0.3 MG/DL (ref 0.3–1.2)
BILIRUBIN URINE: NEGATIVE
BUN BLDV-MCNC: 10 MG/DL (ref 8–23)
BUN/CREAT BLD: 14 (ref 9–20)
CALCIUM SERPL-MCNC: 10.1 MG/DL (ref 8.6–10.4)
CASTS UA: ABNORMAL /LPF (ref 0–2)
CHLORIDE BLD-SCNC: 103 MMOL/L (ref 98–107)
CO2: 29 MMOL/L (ref 20–31)
COLOR: ABNORMAL
COMMENT UA: ABNORMAL
CREAT SERPL-MCNC: 0.73 MG/DL (ref 0.5–0.9)
CRYSTALS, UA: ABNORMAL /HPF
DIFFERENTIAL TYPE: NORMAL
EKG ATRIAL RATE: 57 BPM
EKG P AXIS: 53 DEGREES
EKG P-R INTERVAL: 146 MS
EKG Q-T INTERVAL: 408 MS
EKG QRS DURATION: 80 MS
EKG QTC CALCULATION (BAZETT): 397 MS
EKG R AXIS: 37 DEGREES
EKG T AXIS: 47 DEGREES
EKG VENTRICULAR RATE: 57 BPM
EOSINOPHILS RELATIVE PERCENT: 4 % (ref 1–7)
EPITHELIAL CELLS UA: ABNORMAL /HPF (ref 0–5)
GFR AFRICAN AMERICAN: >60 ML/MIN
GFR NON-AFRICAN AMERICAN: >60 ML/MIN
GFR SERPL CREATININE-BSD FRML MDRD: NORMAL ML/MIN/{1.73_M2}
GFR SERPL CREATININE-BSD FRML MDRD: NORMAL ML/MIN/{1.73_M2}
GLUCOSE BLD-MCNC: 95 MG/DL (ref 70–99)
GLUCOSE URINE: NEGATIVE
HCT VFR BLD CALC: 39 % (ref 36–46)
HEMOGLOBIN: 12.7 G/DL (ref 12–16)
IMMATURE GRANULOCYTES: NORMAL %
KETONES, URINE: NEGATIVE
LEUKOCYTE ESTERASE, URINE: NEGATIVE
LYMPHOCYTES # BLD: 33 % (ref 16–46)
MCH RBC QN AUTO: 27.6 PG (ref 26–34)
MCHC RBC AUTO-ENTMCNC: 32.7 G/DL (ref 31–37)
MCV RBC AUTO: 84.5 FL (ref 80–100)
MONOCYTES # BLD: 9 % (ref 4–11)
MUCUS: ABNORMAL
NITRITE, URINE: NEGATIVE
NRBC AUTOMATED: NORMAL PER 100 WBC
OTHER OBSERVATIONS UA: ABNORMAL
PDW BLD-RTO: 14.3 % (ref 11–14.5)
PH UA: 6.5 (ref 5–6)
PLATELET # BLD: 299 K/UL (ref 140–450)
PLATELET ESTIMATE: NORMAL
PMV BLD AUTO: 7.8 FL (ref 6–12)
POTASSIUM SERPL-SCNC: 3.8 MMOL/L (ref 3.7–5.3)
PROTEIN UA: NEGATIVE
RBC # BLD: 4.61 M/UL (ref 4–5.2)
RBC # BLD: NORMAL 10*6/UL
RBC UA: ABNORMAL /HPF (ref 0–4)
RENAL EPITHELIAL, UA: ABNORMAL /HPF
SEG NEUTROPHILS: 53 % (ref 43–77)
SEGMENTED NEUTROPHILS ABSOLUTE COUNT: 3.2 K/UL (ref 1.8–7.7)
SODIUM BLD-SCNC: 144 MMOL/L (ref 135–144)
SPECIFIC GRAVITY UA: 1.02 (ref 1.01–1.02)
TOTAL PROTEIN: 7.5 G/DL (ref 6.4–8.3)
TRICHOMONAS: ABNORMAL
TURBIDITY: ABNORMAL
URINE HGB: NEGATIVE
UROBILINOGEN, URINE: NORMAL
WBC # BLD: 6 K/UL (ref 3.5–11)
WBC # BLD: NORMAL 10*3/UL
WBC UA: ABNORMAL /HPF (ref 0–4)
YEAST: ABNORMAL

## 2018-02-07 PROCEDURE — 36415 COLL VENOUS BLD VENIPUNCTURE: CPT

## 2018-02-07 PROCEDURE — 85025 COMPLETE CBC W/AUTO DIFF WBC: CPT

## 2018-02-07 PROCEDURE — 81001 URINALYSIS AUTO W/SCOPE: CPT

## 2018-02-07 PROCEDURE — 99243 OFF/OP CNSLTJ NEW/EST LOW 30: CPT | Performed by: NURSE PRACTITIONER

## 2018-02-07 PROCEDURE — 93005 ELECTROCARDIOGRAM TRACING: CPT

## 2018-02-07 PROCEDURE — 80053 COMPREHEN METABOLIC PANEL: CPT

## 2018-02-07 PROCEDURE — 71046 X-RAY EXAM CHEST 2 VIEWS: CPT

## 2018-02-07 RX ORDER — PREDNISOLONE SODIUM PHOSPHATE 10 MG/ML
SOLUTION/ DROPS OPHTHALMIC
COMMUNITY
Start: 2018-01-29 | End: 2018-05-14 | Stop reason: ALTCHOICE

## 2018-02-07 RX ORDER — LANOLIN ALCOHOL/MO/W.PET/CERES
1 CREAM (GRAM) TOPICAL
COMMUNITY

## 2018-02-07 RX ORDER — KETOROLAC TROMETHAMINE 5 MG/ML
SOLUTION OPHTHALMIC
COMMUNITY
Start: 2018-01-29 | End: 2018-05-14 | Stop reason: ALTCHOICE

## 2018-02-07 ASSESSMENT — ENCOUNTER SYMPTOMS
NAUSEA: 0
TROUBLE SWALLOWING: 0
SINUS PRESSURE: 0
COUGH: 0
CONSTIPATION: 0
EYE REDNESS: 0
DIARRHEA: 0
GASTROINTESTINAL NEGATIVE: 1
ABDOMINAL PAIN: 0
VOMITING: 0
EYE PAIN: 0
ALLERGIC/IMMUNOLOGIC NEGATIVE: 1
CHEST TIGHTNESS: 0
SHORTNESS OF BREATH: 0
EYE ITCHING: 0

## 2018-02-07 NOTE — PROGRESS NOTES
02/07/2018 NOT REPORTED  0.00 - 0.30 k/uL Final    WBC Morphology 02/07/2018 NOT REPORTED   Final    RBC Morphology 02/07/2018 NOT REPORTED   Final    Platelet Estimate 64/35/5021 NOT REPORTED   Final    Seg Neutrophils 02/07/2018 53  43 - 77 % Final    Lymphocytes 02/07/2018 33  16 - 46 % Final    Monocytes 02/07/2018 9  4 - 11 % Final    Eosinophils % 02/07/2018 4  1 - 7 % Final    Basophils 02/07/2018 1  0 - 1 % Final    Segs Absolute 02/07/2018 3.20  1.8 - 7.7 k/uL Final    Absolute Lymph # 02/07/2018 2.00  1.0 - 4.8 k/uL Final    Absolute Mono # 02/07/2018 0.50  0.1 - 1.2 k/uL Final    Absolute Eos # 02/07/2018 0.30  0.0 - 0.4 k/uL Final    Basophils # 02/07/2018 0.00  0.0 - 0.2 k/uL Final    Comment: Performed at [facility] [address] [address] [phone]      Color, UA 02/07/2018 NOT REPORTED  YEL Final    Turbidity UA 02/07/2018 NOT REPORTED  CLEAR Final    Glucose, Ur 02/07/2018 NEGATIVE  NEG Final    Bilirubin Urine 02/07/2018 NEGATIVE  NEG Final    Ketones, Urine 02/07/2018 NEGATIVE  NEG Final    Specific Gravity, UA 02/07/2018 1.020  1.010 - 1.025 Final    Urine Hgb 02/07/2018 NEGATIVE  NEG Final    pH, UA 02/07/2018 6.5* 5.0 - 6.0 Final    Protein, UA 02/07/2018 NEGATIVE  NEG Final    Urobilinogen, Urine 02/07/2018 Normal  NORM Final    Nitrite, Urine 02/07/2018 NEGATIVE  NEG Final    Leukocyte Esterase, Urine 02/07/2018 NEGATIVE  NEG Final    Comment: Performed at [facility] [address] [facility] [address] [place] [id number] [phone]      Urinalysis Comments 02/07/2018 NOT REPORTED   Final    Glucose 02/07/2018 95  70 - 99 mg/dL Final    BUN 02/07/2018 10  8 - 23 mg/dL Final    CREATININE 02/07/2018 0.73  0.50 - 0.90 mg/dL Final    Bun/Cre Ratio 02/07/2018 14  9 - 20 Final    Calcium 02/07/2018 10.1  8.6 - 10.4 mg/dL Final    Sodium 02/07/2018 144  135 - 144 mmol/L Final    Potassium 02/07/2018 3.8  3.7 - 5.3 mmol/L Final    Chloride 02/07/2018 103  98 - 107 mmol/L Final    CO2 02/07/2018 29  20 - 31 mmol/L Final    Anion Gap 02/07/2018 12  9 - 17 mmol/L Final    Alkaline Phosphatase 02/07/2018 104  35 - 104 U/L Final    ALT 02/07/2018 18  5 - 33 U/L Final    AST 02/07/2018 21  <32 U/L Final    Total Bilirubin 02/07/2018 0.30  0.3 - 1.2 mg/dL Final    Total Protein 02/07/2018 7.5  6.4 - 8.3 g/dL Final    Alb 02/07/2018 4.5  3.5 - 5.2 g/dL Final    Albumin/Globulin Ratio 02/07/2018 1.5  1.0 - 2.5 Final    GFR Non- 02/07/2018 >60  >60 mL/min Final    GFR  02/07/2018 >60  >60 mL/min Final    GFR Comment 02/07/2018        Final    Comment: Average GFR for 61-76 years old:   80 mL/min/1.73sq m  Chronic Kidney Disease:   <60 mL/min/1.73sq m  Kidney failure:   <15 mL/min/1.73sq m              eGFR calculated using average adult body mass. Additional eGFR calculator   available at:        [email or web address]        Performed at [facility] [address] [place] [address] Tiffany Morillo Reynososophie Self [phone]      GFR Staging 02/07/2018 NOT REPORTED   Final    - 02/07/2018        Final    WBC, UA 02/07/2018 None  0 - 4 /HPF Final    RBC, UA 02/07/2018 None  0 - 4 /HPF Final    Casts UA 02/07/2018 NOT REPORTED  0 - 2 /LPF Final    Crystals UA 02/07/2018 NOT REPORTED  NONE /HPF Final    Epithelial Cells UA 02/07/2018 0 TO 4  0 - 5 /HPF Final    Renal Epithelial, Urine 02/07/2018 NOT REPORTED  0 /HPF Final    Bacteria, UA 02/07/2018 2+* NONE Final    Comment: Performed at [facility] [address] [address] [phone]      Mucus, UA 02/07/2018 NOT REPORTED  NONE Final    Trichomonas, UA 02/07/2018 NOT REPORTED  NONE Final    Amorphous, UA 02/07/2018 NOT REPORTED  NONE Final    Other Observations UA 02/07/2018 NOT REPORTED  NREQ Final    Yeast, UA 02/07/2018 NOT REPORTED  NONE Final   Hospital Outpatient Visit on 02/07/2018   Component Date Value Ref Range Status    Ventricular Rate 02/07/2018 57  BPM Preliminary    Atrial Rate 02/07/2018 57  BPM Preliminary    P-R Interval 02/07/2018 146  ms Preliminary    QRS Duration 02/07/2018 80  ms Preliminary    Q-T Interval 02/07/2018 408  ms Preliminary    QTc Calculation (Bazett) 02/07/2018 397  ms Preliminary    P Axis 02/07/2018 53  degrees Preliminary    R Axis 02/07/2018 37  degrees Preliminary    T Axis 02/07/2018 47  degrees Preliminary        Objective:        /80   Pulse 76   Ht 5' 6\" (1.676 m)   Wt 211 lb (95.7 kg)   SpO2 98%   BMI 34.06 kg/m²     Physical Exam   Constitutional: She is oriented to person, place, and time. She appears well-developed and well-nourished. HENT:   Head: Normocephalic and atraumatic. Right Ear: Hearing and external ear normal. No middle ear effusion. Left Ear: Hearing, tympanic membrane and external ear normal.  No middle ear effusion. Nose: Nose normal.   Mouth/Throat: Uvula is midline and mucous membranes are normal. Posterior oropharyngeal erythema present. No oropharyngeal exudate. Eyes: Conjunctivae and EOM are normal. Pupils are equal, round, and reactive to light. Neck: Normal range of motion. Neck supple. Cardiovascular: Normal rate, regular rhythm, normal heart sounds and intact distal pulses. Pulmonary/Chest: Effort normal and breath sounds normal. No respiratory distress. Abdominal: Soft. Bowel sounds are normal.   Musculoskeletal: Normal range of motion. Neurological: She is alert and oriented to person, place, and time. Skin: Skin is warm and dry. Psychiatric: She has a normal mood and affect. Her behavior is normal. Judgment and thought content normal.          Assessment & Plan:       1.  Pre-op exam  CBC Auto Differential    UA W/REFLEX CULTURE    Comprehensive Metabolic Panel    EKG 12 Lead    XR

## 2018-02-26 ENCOUNTER — HOSPITAL ENCOUNTER (OUTPATIENT)
Dept: LAB | Age: 63
Setting detail: SPECIMEN
Discharge: HOME | End: 2018-02-26
Payer: COMMERCIAL

## 2018-02-26 ENCOUNTER — OFFICE VISIT (OUTPATIENT)
Dept: ONCOLOGY | Age: 63
End: 2018-02-26
Payer: COMMERCIAL

## 2018-02-26 VITALS
TEMPERATURE: 98.5 F | WEIGHT: 211 LBS | DIASTOLIC BLOOD PRESSURE: 70 MMHG | HEART RATE: 57 BPM | BODY MASS INDEX: 34.06 KG/M2 | SYSTOLIC BLOOD PRESSURE: 131 MMHG

## 2018-02-26 DIAGNOSIS — C50.511 MALIGNANT NEOPLASM OF LOWER-OUTER QUADRANT OF RIGHT BREAST OF FEMALE, ESTROGEN RECEPTOR POSITIVE (HCC): Primary | ICD-10-CM

## 2018-02-26 DIAGNOSIS — Z17.0 MALIGNANT NEOPLASM OF LOWER-OUTER QUADRANT OF RIGHT BREAST OF FEMALE, ESTROGEN RECEPTOR POSITIVE (HCC): Primary | ICD-10-CM

## 2018-02-26 DIAGNOSIS — M81.0 AGE-RELATED OSTEOPOROSIS WITHOUT CURRENT PATHOLOGICAL FRACTURE: ICD-10-CM

## 2018-02-26 DIAGNOSIS — E78.5 HYPERLIPIDEMIA, UNSPECIFIED HYPERLIPIDEMIA TYPE: ICD-10-CM

## 2018-02-26 DIAGNOSIS — Z17.0 MALIGNANT NEOPLASM OF LOWER-OUTER QUADRANT OF RIGHT BREAST OF FEMALE, ESTROGEN RECEPTOR POSITIVE (HCC): ICD-10-CM

## 2018-02-26 DIAGNOSIS — C50.511 MALIGNANT NEOPLASM OF LOWER-OUTER QUADRANT OF RIGHT BREAST OF FEMALE, ESTROGEN RECEPTOR POSITIVE (HCC): ICD-10-CM

## 2018-02-26 LAB
ABSOLUTE EOS #: 0.3 K/UL (ref 0–0.4)
ABSOLUTE IMMATURE GRANULOCYTE: ABNORMAL K/UL (ref 0–0.3)
ABSOLUTE LYMPH #: 2 K/UL (ref 1–4.8)
ABSOLUTE MONO #: 0.5 K/UL (ref 0.1–1.2)
ALBUMIN SERPL-MCNC: 4.3 G/DL (ref 3.5–5.2)
ALBUMIN/GLOBULIN RATIO: 1.5 (ref 1–2.5)
ALP BLD-CCNC: 101 U/L (ref 35–104)
ALT SERPL-CCNC: 16 U/L (ref 5–33)
ANION GAP SERPL CALCULATED.3IONS-SCNC: 10 MMOL/L (ref 9–17)
AST SERPL-CCNC: 18 U/L
BASOPHILS # BLD: 1 % (ref 0–1)
BASOPHILS ABSOLUTE: 0 K/UL (ref 0–0.2)
BILIRUB SERPL-MCNC: 0.25 MG/DL (ref 0.3–1.2)
BUN BLDV-MCNC: 14 MG/DL (ref 8–23)
BUN/CREAT BLD: 19 (ref 9–20)
CALCIUM SERPL-MCNC: 9.7 MG/DL (ref 8.6–10.4)
CHLORIDE BLD-SCNC: 102 MMOL/L (ref 98–107)
CHOLESTEROL/HDL RATIO: 3.2
CHOLESTEROL: 162 MG/DL
CO2: 31 MMOL/L (ref 20–31)
CREAT SERPL-MCNC: 0.74 MG/DL (ref 0.5–0.9)
DIFFERENTIAL TYPE: ABNORMAL
EOSINOPHILS RELATIVE PERCENT: 6 % (ref 1–7)
GFR AFRICAN AMERICAN: >60 ML/MIN
GFR NON-AFRICAN AMERICAN: >60 ML/MIN
GFR SERPL CREATININE-BSD FRML MDRD: ABNORMAL ML/MIN/{1.73_M2}
GFR SERPL CREATININE-BSD FRML MDRD: ABNORMAL ML/MIN/{1.73_M2}
GLUCOSE BLD-MCNC: 100 MG/DL (ref 70–99)
HCT VFR BLD CALC: 38.9 % (ref 36–46)
HDLC SERPL-MCNC: 50 MG/DL
HEMOGLOBIN: 12.7 G/DL (ref 12–16)
IMMATURE GRANULOCYTES: ABNORMAL %
LACTATE DEHYDROGENASE: 156 U/L (ref 135–214)
LDL CHOLESTEROL: 80 MG/DL (ref 0–130)
LYMPHOCYTES # BLD: 38 % (ref 16–46)
MCH RBC QN AUTO: 27.9 PG (ref 26–34)
MCHC RBC AUTO-ENTMCNC: 32.6 G/DL (ref 31–37)
MCV RBC AUTO: 85.6 FL (ref 80–100)
MONOCYTES # BLD: 9 % (ref 4–11)
NRBC AUTOMATED: ABNORMAL PER 100 WBC
PDW BLD-RTO: 14.9 % (ref 11–14.5)
PLATELET # BLD: 275 K/UL (ref 140–450)
PLATELET ESTIMATE: ABNORMAL
PMV BLD AUTO: 8.2 FL (ref 6–12)
POTASSIUM SERPL-SCNC: 4 MMOL/L (ref 3.7–5.3)
RBC # BLD: 4.54 M/UL (ref 4–5.2)
RBC # BLD: ABNORMAL 10*6/UL
SEG NEUTROPHILS: 46 % (ref 43–77)
SEGMENTED NEUTROPHILS ABSOLUTE COUNT: 2.5 K/UL (ref 1.8–7.7)
SODIUM BLD-SCNC: 143 MMOL/L (ref 135–144)
TOTAL PROTEIN: 7.2 G/DL (ref 6.4–8.3)
TRIGL SERPL-MCNC: 158 MG/DL
VLDLC SERPL CALC-MCNC: ABNORMAL MG/DL (ref 1–30)
WBC # BLD: 5.3 K/UL (ref 3.5–11)
WBC # BLD: ABNORMAL 10*3/UL

## 2018-02-26 PROCEDURE — 80061 LIPID PANEL: CPT

## 2018-02-26 PROCEDURE — 80053 COMPREHEN METABOLIC PANEL: CPT

## 2018-02-26 PROCEDURE — 85025 COMPLETE CBC W/AUTO DIFF WBC: CPT

## 2018-02-26 PROCEDURE — 83615 LACTATE (LD) (LDH) ENZYME: CPT

## 2018-02-26 PROCEDURE — 99214 OFFICE O/P EST MOD 30 MIN: CPT | Performed by: INTERNAL MEDICINE

## 2018-02-26 PROCEDURE — 36415 COLL VENOUS BLD VENIPUNCTURE: CPT

## 2018-02-27 DIAGNOSIS — E78.5 HYPERLIPIDEMIA, UNSPECIFIED HYPERLIPIDEMIA TYPE: Primary | ICD-10-CM

## 2018-02-27 RX ORDER — ATORVASTATIN CALCIUM 20 MG/1
TABLET, FILM COATED ORAL
Qty: 90 TABLET | Refills: 3 | Status: SHIPPED | OUTPATIENT
Start: 2018-02-27 | End: 2019-02-15 | Stop reason: SDUPTHER

## 2018-03-26 RX ORDER — LETROZOLE 2.5 MG/1
TABLET, FILM COATED ORAL
Qty: 90 TABLET | Refills: 1 | Status: SHIPPED | OUTPATIENT
Start: 2018-03-26 | End: 2018-09-01 | Stop reason: SDUPTHER

## 2018-05-14 ENCOUNTER — OFFICE VISIT (OUTPATIENT)
Dept: FAMILY MEDICINE CLINIC | Age: 63
End: 2018-05-14
Payer: COMMERCIAL

## 2018-05-14 VITALS
DIASTOLIC BLOOD PRESSURE: 72 MMHG | SYSTOLIC BLOOD PRESSURE: 128 MMHG | BODY MASS INDEX: 34.22 KG/M2 | WEIGHT: 212 LBS | OXYGEN SATURATION: 97 % | HEART RATE: 76 BPM

## 2018-05-14 DIAGNOSIS — E78.5 HYPERLIPIDEMIA, UNSPECIFIED HYPERLIPIDEMIA TYPE: ICD-10-CM

## 2018-05-14 DIAGNOSIS — F32.A DEPRESSION, UNSPECIFIED DEPRESSION TYPE: ICD-10-CM

## 2018-05-14 DIAGNOSIS — K21.9 GASTROESOPHAGEAL REFLUX DISEASE, ESOPHAGITIS PRESENCE NOT SPECIFIED: Primary | ICD-10-CM

## 2018-05-14 DIAGNOSIS — Z23 NEED FOR SHINGLES VACCINE: ICD-10-CM

## 2018-05-14 DIAGNOSIS — C50.511 MALIGNANT NEOPLASM OF LOWER-OUTER QUADRANT OF RIGHT FEMALE BREAST, UNSPECIFIED ESTROGEN RECEPTOR STATUS (HCC): ICD-10-CM

## 2018-05-14 DIAGNOSIS — E55.9 VITAMIN D DEFICIENCY: ICD-10-CM

## 2018-05-14 PROCEDURE — 99214 OFFICE O/P EST MOD 30 MIN: CPT | Performed by: FAMILY MEDICINE

## 2018-05-14 ASSESSMENT — ENCOUNTER SYMPTOMS
VOMITING: 0
TROUBLE SWALLOWING: 0
COUGH: 1
NAUSEA: 1
SHORTNESS OF BREATH: 1
WHEEZING: 0
BLOOD IN STOOL: 0
CHEST TIGHTNESS: 1

## 2018-05-14 ASSESSMENT — PATIENT HEALTH QUESTIONNAIRE - PHQ9
2. FEELING DOWN, DEPRESSED OR HOPELESS: 0
SUM OF ALL RESPONSES TO PHQ9 QUESTIONS 1 & 2: 0
SUM OF ALL RESPONSES TO PHQ QUESTIONS 1-9: 0
1. LITTLE INTEREST OR PLEASURE IN DOING THINGS: 0

## 2018-06-05 ENCOUNTER — OFFICE VISIT (OUTPATIENT)
Dept: PRIMARY CARE CLINIC | Age: 63
End: 2018-06-05
Payer: COMMERCIAL

## 2018-06-05 VITALS
DIASTOLIC BLOOD PRESSURE: 62 MMHG | OXYGEN SATURATION: 95 % | BODY MASS INDEX: 34.22 KG/M2 | WEIGHT: 212 LBS | HEART RATE: 62 BPM | SYSTOLIC BLOOD PRESSURE: 124 MMHG

## 2018-06-05 DIAGNOSIS — L23.7 POISON IVY DERMATITIS: Primary | ICD-10-CM

## 2018-06-05 PROCEDURE — 99213 OFFICE O/P EST LOW 20 MIN: CPT | Performed by: FAMILY MEDICINE

## 2018-06-05 RX ORDER — TRIAMCINOLONE ACETONIDE 5 MG/G
CREAM TOPICAL
Qty: 1 TUBE | Refills: 0 | Status: SHIPPED | OUTPATIENT
Start: 2018-06-05 | End: 2018-08-27 | Stop reason: ALTCHOICE

## 2018-06-05 ASSESSMENT — ENCOUNTER SYMPTOMS: SHORTNESS OF BREATH: 0

## 2018-06-26 ENCOUNTER — PATIENT MESSAGE (OUTPATIENT)
Dept: PRIMARY CARE CLINIC | Age: 63
End: 2018-06-26

## 2018-06-26 DIAGNOSIS — L30.9 DERMATITIS: Primary | ICD-10-CM

## 2018-06-26 RX ORDER — PREDNISONE 20 MG/1
TABLET ORAL
Qty: 16 TABLET | Refills: 0 | Status: SHIPPED | OUTPATIENT
Start: 2018-06-26 | End: 2018-08-27 | Stop reason: ALTCHOICE

## 2018-06-28 ENCOUNTER — HOSPITAL ENCOUNTER (OUTPATIENT)
Dept: BONE DENSITY | Age: 63
Discharge: HOME | End: 2018-06-30
Payer: COMMERCIAL

## 2018-06-28 ENCOUNTER — HOSPITAL ENCOUNTER (OUTPATIENT)
Dept: MAMMOGRAPHY | Age: 63
Discharge: HOME | End: 2018-06-30
Payer: COMMERCIAL

## 2018-06-28 DIAGNOSIS — C50.511 MALIGNANT NEOPLASM OF LOWER-OUTER QUADRANT OF RIGHT BREAST OF FEMALE, ESTROGEN RECEPTOR POSITIVE (HCC): ICD-10-CM

## 2018-06-28 DIAGNOSIS — Z17.0 MALIGNANT NEOPLASM OF LOWER-OUTER QUADRANT OF RIGHT BREAST OF FEMALE, ESTROGEN RECEPTOR POSITIVE (HCC): ICD-10-CM

## 2018-06-28 DIAGNOSIS — M81.0 AGE-RELATED OSTEOPOROSIS WITHOUT CURRENT PATHOLOGICAL FRACTURE: ICD-10-CM

## 2018-06-28 PROCEDURE — 77080 DXA BONE DENSITY AXIAL: CPT

## 2018-06-28 PROCEDURE — 77063 BREAST TOMOSYNTHESIS BI: CPT

## 2018-06-30 DIAGNOSIS — E78.5 HYPERLIPIDEMIA, UNSPECIFIED HYPERLIPIDEMIA TYPE: ICD-10-CM

## 2018-07-02 RX ORDER — FENOFIBRATE 145 MG/1
TABLET, COATED ORAL
Qty: 90 TABLET | Refills: 2 | Status: SHIPPED | OUTPATIENT
Start: 2018-07-02 | End: 2019-02-15 | Stop reason: SDUPTHER

## 2018-07-04 ENCOUNTER — PATIENT MESSAGE (OUTPATIENT)
Dept: PRIMARY CARE CLINIC | Age: 63
End: 2018-07-04

## 2018-07-05 NOTE — TELEPHONE ENCOUNTER
From: Elzbieta James  To: Jose Gerber DO  Sent: 7/4/2018 1:46 PM EDT  Subject: Visit [facility]    Dr. Joanna Hand,    Just wanted to say thanks for the prescription- it helped my rash. It is finally gone, I did use some anti-fungal cream with it also. Thanks so much for your time.     Elzbieta James

## 2018-08-27 ENCOUNTER — OFFICE VISIT (OUTPATIENT)
Dept: ONCOLOGY | Age: 63
End: 2018-08-27
Payer: COMMERCIAL

## 2018-08-27 VITALS
WEIGHT: 209 LBS | DIASTOLIC BLOOD PRESSURE: 68 MMHG | BODY MASS INDEX: 32.8 KG/M2 | HEART RATE: 68 BPM | HEIGHT: 67 IN | TEMPERATURE: 96.7 F | SYSTOLIC BLOOD PRESSURE: 128 MMHG

## 2018-08-27 DIAGNOSIS — Z17.0 MALIGNANT NEOPLASM OF LOWER-OUTER QUADRANT OF RIGHT BREAST OF FEMALE, ESTROGEN RECEPTOR POSITIVE (HCC): Primary | ICD-10-CM

## 2018-08-27 DIAGNOSIS — M81.0 AGE-RELATED OSTEOPOROSIS WITHOUT CURRENT PATHOLOGICAL FRACTURE: ICD-10-CM

## 2018-08-27 DIAGNOSIS — C50.511 MALIGNANT NEOPLASM OF LOWER-OUTER QUADRANT OF RIGHT BREAST OF FEMALE, ESTROGEN RECEPTOR POSITIVE (HCC): Primary | ICD-10-CM

## 2018-08-27 PROCEDURE — 99214 OFFICE O/P EST MOD 30 MIN: CPT | Performed by: INTERNAL MEDICINE

## 2018-08-27 NOTE — PROGRESS NOTES
Patient ID: Iraida Pond, [birth year], [id number], 60 y.o. Diagnosis:   Right-sided breast cancer,T1cN0, status post lumpectomy with sentinel lymph node biopsy in December 2015  Adjuvant radiation therapy completed in February 2016  Started on Femara March 2016  HISTORY OF PRESENT ILLNESS:    Oncologic History: This is a 42-year-old female who was diagnosed with right-sided breast cancer in November 2015 and subsequently had lumpectomy with sentinel lymph node biopsy in December 2015. The pathology was positive for well-differentiated ductal carcinoma measuring 1.6 cm with low-grade ductal carcinoma in situ. There was also atypical lobular hyperplasia and lobular carcinoma in situ noted. Final surgical pathology showed P T1cp N0 disease, ER/MD positive and HER-2/александр negative. Patient completed radiation therapy in February 2016 and started on endocrine therapy with Femara in March 2016. Interval history:  Patient is returning for follow-up visit. She started Femara well. She has mild hot flashes but are tolerable. She had a mammogram in June of this year which showed no evidence of recurrence. Her bone density scan in June showed osteopenia. She denied any new lumps or bumps in her breast.  No unintentional weight loss, new bone pain or back pain. During this visit patient's allergy, social, medical, surgical history and medications were reviewed and updated.   No Known Allergies    Current Outpatient Prescriptions   Medication Sig Dispense Refill    fenofibrate (TRICOR) 145 MG tablet TAKE 1 TABLET DAILY 90 tablet 2    letrozole (FEMARA) 2.5 MG tablet TAKE 1 TABLET DAILY 90 tablet 1    atorvastatin (LIPITOR) 20 MG tablet TAKE 1 TABLET DAILY 90 tablet 3    calcium citrate-vitamin D (CITRACAL+D) 315-200 MG-UNIT per tablet Take 1 tablet by mouth      montelukast (SINGULAIR) 10 MG tablet TAKE 1 TABLET NIGHTLY 90 tablet 3    hydrocortisone (ANUSOL-HC) 2.5 % rectal cream Place rectally hepatosplenomegaly   Chest - clear to auscultation, no wheezes, rales or rhonchi, symmetric air entry   Heart - normal rate, regular rhythm, normal S1, S2, no murmurs, rubs, clicks or gallops   Abdomen - soft, nontender, nondistended, no masses or organomegaly   Neurological - alert, oriented, normal speech, no focal findings or movement disorder noted   Musculoskeletal - no joint tenderness, deformity or swelling   Extremities - peripheral pulses normal, no pedal edema, no clubbing or cyanosis   Skin - normal coloration and turgor, no rashes, no suspicious skin lesions noted ,    LABORATORY DATA:     Lab Results   Component Value Date    WBC 5.3 02/26/2018    HGB 12.7 02/26/2018    HCT 38.9 02/26/2018    MCV 85.6 02/26/2018     02/26/2018    LYMPHOPCT 38 02/26/2018    RBC 4.54 02/26/2018    MCH 27.9 02/26/2018    MCHC 32.6 02/26/2018    RDW 14.9 (H) 02/26/2018    MONOPCT 9 02/26/2018    BASOPCT 1 02/26/2018    NEUTROABS 2.50 02/26/2018    LYMPHSABS 2.00 02/26/2018    MONOSABS 0.50 02/26/2018    EOSABS 0.30 02/26/2018    BASOSABS 0.00 02/26/2018         Chemistry        Component Value Date/Time     02/26/2018 0928    K 4.0 02/26/2018 0928     02/26/2018 0928    CO2 31 02/26/2018 0928    BUN 14 02/26/2018 0928    CREATININE 0.74 02/26/2018 0928        Component Value Date/Time    CALCIUM 9.7 02/26/2018 0928    ALKPHOS 101 02/26/2018 0928    AST 18 02/26/2018 0928    ALT 16 02/26/2018 0928    BILITOT 0.25 (L) 02/26/2018 0928        PATHOLOGY DATA:   Collected: 12/14/2015   Received: 12/15/2015   Reported: 12/16/2015 14:25     -- Diagnosis --   1.  RIGHT BREAST, EXCISIONAL LUMPECTOMY WITH WIRE LOCALIZATION:       -  INVASIVE, WELL-DIFFERENTIATED DUCTAL CARCINOMA (1.6 CM).  SEE     COMMENT. -  FOCAL LOW GRADE DUCTAL CARCINOMA IN SITU IS ASSOCIATED WITH THE     INVASIVE DUCTAL CARCINOMA. -  ATYPICAL LOBULAR HYPERPLASIA AND LOBULAR CARCINOMA IN SITU.    -  THE SURGICAL MARGINS ARE NEGATIVE FOR

## 2018-09-01 DIAGNOSIS — F32.A DEPRESSION, UNSPECIFIED DEPRESSION TYPE: Primary | ICD-10-CM

## 2018-09-04 RX ORDER — LETROZOLE 2.5 MG/1
TABLET, FILM COATED ORAL
Qty: 90 TABLET | Refills: 1 | Status: SHIPPED | OUTPATIENT
Start: 2018-09-04 | End: 2019-03-11 | Stop reason: SDUPTHER

## 2018-09-05 RX ORDER — PAROXETINE HYDROCHLORIDE 20 MG/1
TABLET, FILM COATED ORAL
Qty: 90 TABLET | Refills: 3 | Status: SHIPPED | OUTPATIENT
Start: 2018-09-05 | End: 2019-09-25 | Stop reason: DRUGHIGH

## 2018-10-16 ENCOUNTER — OFFICE VISIT (OUTPATIENT)
Dept: FAMILY MEDICINE CLINIC | Age: 63
End: 2018-10-16
Payer: COMMERCIAL

## 2018-10-16 VITALS
HEIGHT: 66 IN | OXYGEN SATURATION: 98 % | BODY MASS INDEX: 33.59 KG/M2 | DIASTOLIC BLOOD PRESSURE: 62 MMHG | WEIGHT: 209 LBS | SYSTOLIC BLOOD PRESSURE: 116 MMHG | HEART RATE: 62 BPM

## 2018-10-16 DIAGNOSIS — F41.9 ANXIETY: Primary | ICD-10-CM

## 2018-10-16 DIAGNOSIS — K21.9 GASTROESOPHAGEAL REFLUX DISEASE, ESOPHAGITIS PRESENCE NOT SPECIFIED: ICD-10-CM

## 2018-10-16 DIAGNOSIS — J30.9 ALLERGIC RHINITIS, UNSPECIFIED SEASONALITY, UNSPECIFIED TRIGGER: ICD-10-CM

## 2018-10-16 DIAGNOSIS — E78.5 HYPERLIPIDEMIA, UNSPECIFIED HYPERLIPIDEMIA TYPE: ICD-10-CM

## 2018-10-16 DIAGNOSIS — L72.11 PILAR CYST: ICD-10-CM

## 2018-10-16 PROCEDURE — 99214 OFFICE O/P EST MOD 30 MIN: CPT | Performed by: FAMILY MEDICINE

## 2018-10-16 ASSESSMENT — ENCOUNTER SYMPTOMS
WHEEZING: 0
NAUSEA: 0
VOMITING: 0
BLOOD IN STOOL: 0
DIARRHEA: 1
COUGH: 1
SHORTNESS OF BREATH: 0

## 2018-10-23 ENCOUNTER — INITIAL CONSULT (OUTPATIENT)
Dept: SURGERY | Age: 63
End: 2018-10-23
Payer: COMMERCIAL

## 2018-10-23 VITALS
HEART RATE: 68 BPM | WEIGHT: 211.2 LBS | DIASTOLIC BLOOD PRESSURE: 74 MMHG | BODY MASS INDEX: 33.94 KG/M2 | SYSTOLIC BLOOD PRESSURE: 112 MMHG | TEMPERATURE: 99.3 F | HEIGHT: 66 IN

## 2018-10-23 DIAGNOSIS — L72.11 PILAR CYST: Primary | ICD-10-CM

## 2018-10-23 PROCEDURE — 11423 EXC H-F-NK-SP B9+MARG 2.1-3: CPT | Performed by: SURGERY

## 2018-10-23 NOTE — PATIENT INSTRUCTIONS
SIGNS OF INFECTION  - Redness, swelling, skin hot  - Wound bed turns black or stringy yellow  - Foul odor  - Increased drainage or pus  - Increased pain  - Fever greater than 100F    CALL YOUR DOCTOR OR SEEK MEDICAL ATTENTION IF SIGNS OF INFECTION. DO NOT WAIT UNTIL YOUR NEXT APPOINTMENT    Call the Wound Care Nurse with any other questions or concerns- [phone]    Patient Education        Cuts Closed With Stitches: Care Instructions  Your Care Instructions  A cut can happen anywhere on your body. The doctor used stitches to close the cut. Using stitches also helps the cut heal and reduces scarring. Sometimes pieces of tape called Steri-Strips are put over the stitches. If the cut went deep and through the skin, the doctor may have put in two layers of stitches. The deeper layer brings the deep part of the cut together. These stitches will dissolve and don't need to be removed. The stitches in the upper layer are the ones you see on the cut. You will probably have a bandage over the stitches. You will need to have the stitches removed, usually in 7 to 14 days. The doctor has checked you carefully, but problems can develop later. If you notice any problems or new symptoms, get medical treatment right away. Follow-up care is a key part of your treatment and safety. Be sure to make and go to all appointments, and call your doctor if you are having problems. It's also a good idea to know your test results and keep a list of the medicines you take. How can you care for yourself at home? · Keep the cut dry for the first 24 to 48 hours. After this, you can shower if your doctor okays it. Pat the cut dry. · Don't soak the cut, such as in a bathtub. Your doctor will tell you when it's safe to get the cut wet. · If your doctor told you how to care for your cut, follow your doctor's instructions.  If you did not get instructions, follow this general advice:  ¨ After the first 24 to 48 hours, wash around the cut with clean water 2 times a day. Don't use hydrogen peroxide or alcohol, which can slow healing. ¨ You may cover the cut with a thin layer of petroleum jelly, such as Vaseline, and a nonstick bandage. ¨ Apply more petroleum jelly and replace the bandage as needed. · Prop up the sore area on a pillow anytime you sit or lie down during the next 3 days. Try to keep it above the level of your heart. This will help reduce swelling. · Avoid any activity that could cause your cut to reopen. · Do not remove the stitches on your own. Your doctor will tell you when to come back to have the stitches removed. · Leave Steri-Strips on until they fall off. · Be safe with medicines. Read and follow all instructions on the label. ¨ If the doctor gave you a prescription medicine for pain, take it as prescribed. ¨ If you are not taking a prescription pain medicine, ask your doctor if you can take an over-the-counter medicine. When should you call for help? Call your doctor now or seek immediate medical care if:    · You have new pain, or your pain gets worse.     · The skin near the cut is cold or pale or changes color.     · You have tingling, weakness, or numbness near the cut.     · The cut starts to bleed, and blood soaks through the bandage. Oozing small amounts of blood is normal.     · You have trouble moving the area near the cut.     · You have symptoms of infection, such as:  ¨ Increased pain, swelling, warmth, or redness around the cut. ¨ Red streaks leading from the cut. ¨ Pus draining from the cut. ¨ A fever.    Watch closely for changes in your health, and be sure to contact your doctor if:    · The cut reopens.     · You do not get better as expected. Where can you learn more? Go to [email or web address]://[email or web address].[email or web address] org and sign in to your [employer] account. Enter R217 in the [employer] box to learn more about \"Cuts Closed With Stitches: Care Instructions. \"     If you do not have an

## 2018-11-01 ENCOUNTER — OFFICE VISIT (OUTPATIENT)
Dept: SURGERY | Age: 63
End: 2018-11-01

## 2018-11-01 VITALS
HEIGHT: 66 IN | HEART RATE: 80 BPM | TEMPERATURE: 96.8 F | BODY MASS INDEX: 33.52 KG/M2 | SYSTOLIC BLOOD PRESSURE: 128 MMHG | WEIGHT: 208.6 LBS | RESPIRATION RATE: 16 BRPM | DIASTOLIC BLOOD PRESSURE: 66 MMHG

## 2018-11-01 DIAGNOSIS — L72.11 PILAR CYST: Primary | ICD-10-CM

## 2018-11-01 PROCEDURE — 99024 POSTOP FOLLOW-UP VISIT: CPT | Performed by: SURGERY

## 2019-02-15 DIAGNOSIS — E78.5 HYPERLIPIDEMIA, UNSPECIFIED HYPERLIPIDEMIA TYPE: ICD-10-CM

## 2019-02-19 ENCOUNTER — HOSPITAL ENCOUNTER (OUTPATIENT)
Dept: LAB | Age: 64
Discharge: HOME | End: 2019-02-19
Payer: COMMERCIAL

## 2019-02-19 DIAGNOSIS — E78.5 HYPERLIPIDEMIA, UNSPECIFIED HYPERLIPIDEMIA TYPE: ICD-10-CM

## 2019-02-19 DIAGNOSIS — E55.9 VITAMIN D DEFICIENCY: ICD-10-CM

## 2019-02-19 LAB
CHOLESTEROL/HDL RATIO: 3.8
CHOLESTEROL: 170 MG/DL
HDLC SERPL-MCNC: 45 MG/DL
LDL CHOLESTEROL: 91 MG/DL (ref 0–130)
TRIGL SERPL-MCNC: 168 MG/DL
VITAMIN D 25-HYDROXY: 21.6 NG/ML (ref 30–100)
VLDLC SERPL CALC-MCNC: ABNORMAL MG/DL (ref 1–30)

## 2019-02-19 PROCEDURE — 36415 COLL VENOUS BLD VENIPUNCTURE: CPT

## 2019-02-19 PROCEDURE — 82306 VITAMIN D 25 HYDROXY: CPT

## 2019-02-19 PROCEDURE — 80061 LIPID PANEL: CPT

## 2019-02-21 RX ORDER — ATORVASTATIN CALCIUM 20 MG/1
TABLET, FILM COATED ORAL
Qty: 90 TABLET | Refills: 3 | Status: SHIPPED | OUTPATIENT
Start: 2019-02-21 | End: 2020-01-03

## 2019-02-21 RX ORDER — FENOFIBRATE 145 MG/1
TABLET, COATED ORAL
Qty: 90 TABLET | Refills: 3 | Status: SHIPPED | OUTPATIENT
Start: 2019-02-21 | End: 2020-04-06

## 2019-03-11 ENCOUNTER — OFFICE VISIT (OUTPATIENT)
Dept: ONCOLOGY | Age: 64
End: 2019-03-11
Payer: COMMERCIAL

## 2019-03-11 ENCOUNTER — HOSPITAL ENCOUNTER (OUTPATIENT)
Dept: LAB | Age: 64
Discharge: HOME | End: 2019-03-11
Payer: COMMERCIAL

## 2019-03-11 VITALS
SYSTOLIC BLOOD PRESSURE: 129 MMHG | WEIGHT: 212 LBS | DIASTOLIC BLOOD PRESSURE: 82 MMHG | HEIGHT: 66 IN | BODY MASS INDEX: 34.07 KG/M2 | OXYGEN SATURATION: 98 % | HEART RATE: 69 BPM

## 2019-03-11 DIAGNOSIS — C50.511 MALIGNANT NEOPLASM OF LOWER-OUTER QUADRANT OF RIGHT BREAST OF FEMALE, ESTROGEN RECEPTOR POSITIVE (HCC): ICD-10-CM

## 2019-03-11 DIAGNOSIS — Z17.0 MALIGNANT NEOPLASM OF LOWER-OUTER QUADRANT OF RIGHT BREAST OF FEMALE, ESTROGEN RECEPTOR POSITIVE (HCC): ICD-10-CM

## 2019-03-11 DIAGNOSIS — C50.511 MALIGNANT NEOPLASM OF LOWER-OUTER QUADRANT OF RIGHT BREAST OF FEMALE, ESTROGEN RECEPTOR POSITIVE (HCC): Primary | ICD-10-CM

## 2019-03-11 DIAGNOSIS — Z12.31 SCREENING MAMMOGRAM, ENCOUNTER FOR: ICD-10-CM

## 2019-03-11 DIAGNOSIS — Z17.0 MALIGNANT NEOPLASM OF LOWER-OUTER QUADRANT OF RIGHT BREAST OF FEMALE, ESTROGEN RECEPTOR POSITIVE (HCC): Primary | ICD-10-CM

## 2019-03-11 LAB
ABSOLUTE EOS #: 0.3 K/UL (ref 0–0.4)
ABSOLUTE IMMATURE GRANULOCYTE: ABNORMAL K/UL (ref 0–0.3)
ABSOLUTE LYMPH #: 1.6 K/UL (ref 1–4.8)
ABSOLUTE MONO #: 0.5 K/UL (ref 0.1–1.2)
ALBUMIN SERPL-MCNC: 4.5 G/DL (ref 3.5–5.2)
ALBUMIN/GLOBULIN RATIO: 1.6 (ref 1–2.5)
ALP BLD-CCNC: 76 U/L (ref 35–104)
ALT SERPL-CCNC: 17 U/L (ref 5–33)
ANION GAP SERPL CALCULATED.3IONS-SCNC: 10 MMOL/L (ref 9–17)
AST SERPL-CCNC: 19 U/L
BASOPHILS # BLD: 1 % (ref 0–1)
BASOPHILS ABSOLUTE: 0 K/UL (ref 0–0.2)
BILIRUB SERPL-MCNC: 0.24 MG/DL (ref 0.3–1.2)
BUN BLDV-MCNC: 12 MG/DL (ref 8–23)
BUN/CREAT BLD: 14 (ref 9–20)
CALCIUM SERPL-MCNC: 10.3 MG/DL (ref 8.6–10.4)
CHLORIDE BLD-SCNC: 104 MMOL/L (ref 98–107)
CO2: 30 MMOL/L (ref 20–31)
CREAT SERPL-MCNC: 0.83 MG/DL (ref 0.5–0.9)
DIFFERENTIAL TYPE: ABNORMAL
EOSINOPHILS RELATIVE PERCENT: 6 % (ref 1–7)
GFR AFRICAN AMERICAN: >60 ML/MIN
GFR NON-AFRICAN AMERICAN: >60 ML/MIN
GFR SERPL CREATININE-BSD FRML MDRD: ABNORMAL ML/MIN/{1.73_M2}
GFR SERPL CREATININE-BSD FRML MDRD: ABNORMAL ML/MIN/{1.73_M2}
GLUCOSE BLD-MCNC: 97 MG/DL (ref 70–99)
HCT VFR BLD CALC: 37.1 % (ref 36–46)
HEMOGLOBIN: 11.8 G/DL (ref 12–16)
IMMATURE GRANULOCYTES: ABNORMAL %
LYMPHOCYTES # BLD: 35 % (ref 16–46)
MCH RBC QN AUTO: 27.3 PG (ref 26–34)
MCHC RBC AUTO-ENTMCNC: 31.8 G/DL (ref 31–37)
MCV RBC AUTO: 85.7 FL (ref 80–100)
MONOCYTES # BLD: 10 % (ref 4–11)
NRBC AUTOMATED: ABNORMAL PER 100 WBC
PDW BLD-RTO: 14.8 % (ref 11–14.5)
PLATELET # BLD: 307 K/UL (ref 140–450)
PLATELET ESTIMATE: ABNORMAL
PMV BLD AUTO: 8.1 FL (ref 6–12)
POTASSIUM SERPL-SCNC: 4.1 MMOL/L (ref 3.7–5.3)
RBC # BLD: 4.33 M/UL (ref 4–5.2)
RBC # BLD: ABNORMAL 10*6/UL
SEG NEUTROPHILS: 48 % (ref 43–77)
SEGMENTED NEUTROPHILS ABSOLUTE COUNT: 2.3 K/UL (ref 1.8–7.7)
SODIUM BLD-SCNC: 144 MMOL/L (ref 135–144)
TOTAL PROTEIN: 7.4 G/DL (ref 6.4–8.3)
WBC # BLD: 4.7 K/UL (ref 3.5–11)
WBC # BLD: ABNORMAL 10*3/UL

## 2019-03-11 PROCEDURE — 99214 OFFICE O/P EST MOD 30 MIN: CPT | Performed by: INTERNAL MEDICINE

## 2019-03-11 PROCEDURE — 80053 COMPREHEN METABOLIC PANEL: CPT

## 2019-03-11 PROCEDURE — 85025 COMPLETE CBC W/AUTO DIFF WBC: CPT

## 2019-03-11 PROCEDURE — 36415 COLL VENOUS BLD VENIPUNCTURE: CPT

## 2019-03-11 RX ORDER — VENLAFAXINE HYDROCHLORIDE 37.5 MG/1
37.5 CAPSULE, EXTENDED RELEASE ORAL DAILY
Qty: 30 CAPSULE | Refills: 3 | Status: SHIPPED | OUTPATIENT
Start: 2019-03-11 | End: 2019-07-24

## 2019-03-11 RX ORDER — LETROZOLE 2.5 MG/1
TABLET, FILM COATED ORAL
Qty: 90 TABLET | Refills: 1 | Status: SHIPPED | OUTPATIENT
Start: 2019-03-11 | End: 2019-09-16 | Stop reason: SDUPTHER

## 2019-03-11 NOTE — PROGRESS NOTES
Patient ID: Lluvia Hadley, [birth year], [id number], 58 y.o. Diagnosis:   Right-sided breast cancer,T1cN0, status post lumpectomy with sentinel lymph node biopsy in December 2015  Adjuvant radiation therapy completed in February 2016  Started on Femara March 2016  HISTORY OF PRESENT ILLNESS:    Oncologic History: This is a 69-year-old female who was diagnosed with right-sided breast cancer in November 2015 and subsequently had lumpectomy with sentinel lymph node biopsy in December 2015. The pathology was positive for well-differentiated ductal carcinoma measuring 1.6 cm with low-grade ductal carcinoma in situ. There was also atypical lobular hyperplasia and lobular carcinoma in situ noted. Final surgical pathology showed P T1cp N0 disease, ER/AR positive and HER-2/александр negative. Patient completed radiation therapy in February 2016 and started on endocrine therapy with Femara in March 2016. Interval history:  Patient is returning for follow-up visit. She started Femara well. She denied any unusual side effects. No new bone pain or back pain. She has mild hot flashes but are tolerable. Her bone density scan in June showed osteopenia. She denied any new lumps or bumps in her breast.  No unintentional weight loss, new bone pain or back pain. During this visit patient's allergy, social, medical, surgical history and medications were reviewed and updated.   Allergies   Allergen Reactions    No Known Allergies      unknown       Current Outpatient Medications   Medication Sig Dispense Refill    fenofibrate (TRICOR) 145 MG tablet TAKE 1 TABLET DAILY 90 tablet 3    atorvastatin (LIPITOR) 20 MG tablet TAKE 1 TABLET DAILY 90 tablet 3    PARoxetine (PAXIL) 20 MG tablet TAKE 1 TABLET EVERY MORNING 90 tablet 3    letrozole (FEMARA) 2.5 MG tablet TAKE 1 TABLET DAILY 90 tablet 1    calcium citrate-vitamin D (CITRACAL+D) 315-200 MG-UNIT per tablet Take 1 tablet by mouth      montelukast (SINGULAIR) 10 MG tablet TAKE 1 TABLET NIGHTLY 90 tablet 3    hydrocortisone (ANUSOL-HC) 2.5 % rectal cream Place rectally 2 times daily as needed. 1 Tube 2    timolol (TIMOPTIC) 0.5 % ophthalmic solution       omeprazole (PRILOSEC) 20 MG capsule Take 1 capsule by mouth daily. 90 capsule 1    cetirizine (ZYRTEC) 10 MG tablet Take 10 mg by mouth daily. No current facility-administered medications for this visit. REVIEW OF SYSTEM:   Constitutional: No fever or chills. No night sweats, no weight loss   Eyes: No eye discharge, double vision, or eye pain   HEENT: negative for sore mouth, sore throat, hoarseness and voice change   Respiratory: negative for cough , sputum, dyspnea, wheezing, hemoptysis, chest pain   Cardiovascular: negative for chest pain, dyspnea, palpitations, orthopnea, PND   Gastrointestinal: negative for nausea, vomiting, diarrhea, constipation, abdominal pain, Dysphagia, hematemesis and hematochezia   Genitourinary: negative for frequency, dysuria, nocturia, urinary incontinence, and hematuria   Integument: negative for rash, skin lesions, bruises.    Hematologic/Lymphatic: negative for easy bruising, bleeding, lymphadenopathy, petechiae and swelling/edema   Endocrine: negative for heat or cold intolerance, tremor, weight changes, change in bowel habits and hair loss   Musculoskeletal: negative for myalgias, arthralgias, pain, joint swelling,and bone pain   Neurological: negative for headaches, dizziness, seizures, weakness, numbness     OBJECTIVE:         Vitals:    03/11/19 1020   BP: 129/82   Pulse: 69   SpO2: 98%   PHYSICAL EXAM:   General appearance - well appearing, no in pain or distress   Mental status - alert and cooperative   Eyes - pupils equal and reactive, extraocular eye movements intact   Ears - bilateral TM's and external ear canals normal   Mouth - mucous membranes moist, pharynx normal without lesions   Neck - supple, no significant adenopathy   Lymphatics - no palpable ARE NEGATIVE FOR INVASIVE DUCTAL     CARCINOMA AND DCIS (THE CLOSEST MARGIN IS 0.5 CM FROM THE TUMOR). 2.  SENTINEL LYMPH NODE, EXCISIONAL BIOPSY:       -  NEGATIVE FOR MALIGNANCY (0/1). 3.  SENTINEL LYMPH NODE, EXCISIONAL BIOPSY:       -  NEGATIVE FOR MALIGNANCY (0/1). 4.  SENTINEL LYMPH NODE, EXCISIONAL BIOPSY:       -  NEGATIVE FOR MALIGNANCY (0/1). IMAGING DATA:    1/21/18 diagnostic mammogram  Impression   Unchanged postoperative findings in the right breast.  2 years of short-term   surveillance has been completed in the right breast.  Return to yearly   screening schedule at the time of a bilateral mammogram in 6 months is   recommended.       Mammogram 6/28/18      Negative, no evidence of malignancy.  Normal interval follow-up is   recommended in 12 months. Dexa sacn 6/28/18  Impression   1. Findings above consistent with osteopenia by WHO criteria. 2. Fracture risk is moderate.  Treatment is advised. ASSESSMENT:    This is a 79-year-old female with right-sided breast cancer status post lumpectomy followed by radiation therapy and currently on adjuvant endocrine therapy since March 2016. She is tolerating Femara very well except mild hot flashes which seems to be getting better. At this point there is no evidence of recurrence. I discussed the risk-benefit and she will continue endocrine therapy for 5-10 years. No evidence of recurrence. PLAN:   We will get Labs today  She will get mammogram in June  Continue Femara  RTc 6 months or earlier if needed    I spent more than 25 minutes examining, evaluating, reviewing data and counseling the patient. Greater than 50% of that time was spent face-to-face with the patient in counseling and coordinating her care.     Otilio Palacio MD  Hematology/Oncology

## 2019-04-02 ENCOUNTER — E-VISIT (OUTPATIENT)
Dept: FAMILY MEDICINE CLINIC | Age: 64
End: 2019-04-02
Payer: COMMERCIAL

## 2019-04-02 DIAGNOSIS — J01.90 ACUTE SINUSITIS, RECURRENCE NOT SPECIFIED, UNSPECIFIED LOCATION: Primary | ICD-10-CM

## 2019-04-02 PROCEDURE — 99444 PR PHYSICIAN ONLINE EVALUATION & MANAGEMENT SERVICE: CPT | Performed by: FAMILY MEDICINE

## 2019-04-02 RX ORDER — AMOXICILLIN AND CLAVULANATE POTASSIUM 875; 125 MG/1; MG/1
1 TABLET, FILM COATED ORAL 2 TIMES DAILY
Qty: 20 TABLET | Refills: 0 | Status: SHIPPED | OUTPATIENT
Start: 2019-04-02 | End: 2019-04-12

## 2019-04-02 ASSESSMENT — LIFESTYLE VARIABLES: SMOKING_STATUS: NO, I'VE NEVER SMOKED

## 2019-04-19 ENCOUNTER — OFFICE VISIT (OUTPATIENT)
Dept: FAMILY MEDICINE CLINIC | Age: 64
End: 2019-04-19
Payer: COMMERCIAL

## 2019-04-19 VITALS
WEIGHT: 211 LBS | OXYGEN SATURATION: 98 % | SYSTOLIC BLOOD PRESSURE: 110 MMHG | BODY MASS INDEX: 33.91 KG/M2 | HEART RATE: 70 BPM | HEIGHT: 66 IN | DIASTOLIC BLOOD PRESSURE: 70 MMHG

## 2019-04-19 DIAGNOSIS — E55.9 VITAMIN D DEFICIENCY: ICD-10-CM

## 2019-04-19 DIAGNOSIS — E78.5 HYPERLIPIDEMIA, UNSPECIFIED HYPERLIPIDEMIA TYPE: Primary | ICD-10-CM

## 2019-04-19 DIAGNOSIS — J30.9 ALLERGIC RHINITIS, UNSPECIFIED SEASONALITY, UNSPECIFIED TRIGGER: ICD-10-CM

## 2019-04-19 DIAGNOSIS — R23.2 HOT FLASHES: ICD-10-CM

## 2019-04-19 DIAGNOSIS — F41.9 ANXIETY: ICD-10-CM

## 2019-04-19 DIAGNOSIS — Z13.1 SCREENING FOR DIABETES MELLITUS: ICD-10-CM

## 2019-04-19 DIAGNOSIS — K21.9 GASTROESOPHAGEAL REFLUX DISEASE, ESOPHAGITIS PRESENCE NOT SPECIFIED: ICD-10-CM

## 2019-04-19 PROCEDURE — 99214 OFFICE O/P EST MOD 30 MIN: CPT | Performed by: FAMILY MEDICINE

## 2019-04-19 ASSESSMENT — ENCOUNTER SYMPTOMS
COUGH: 1
SINUS PRESSURE: 1
BLOOD IN STOOL: 1

## 2019-04-19 NOTE — PROGRESS NOTES
[facility] [address]  [address], [address]  [phone]      Luis Miguel Loaiza is a 58 y.o. female who presents today for her medical conditions/complaints as noted below. Luis Miguel Loaiza is c/o of 6 Month Follow-Up (anxiety; was placed on effexor 37.5 mg daily by oncology, in addition to the paxil 20 mg )      HPI:     Pt here today for follow-up of HLD and hot flashes. Taking Atorvastatin 20 mg nightly and Fenofibrate 145 mg daily for HLD - last lipid panel in 2/2019 was at goal, other than minimally elevated TG's at 168 (from 158 last year). BP well-controlled today - 110/70. Pt is having hot flashes daily - up to 3-4 in a day, depending on how active or how anxious she may be. Will at least one per day. Also gets them overnight, but worse during the day. Last approx 3 mins. Pt had increased her Paxil from 20 mg to 30 mg, which helped a little, so she increased further to 40 mg daily. This seemed to help more with her anxiety; no negative side effects. Took this from approx Nov - March, but then she saw Dr. Liliane Louise on 3/11, who recommended starting low-dose Effexor 37.5 mg daily for her hot flashes, so she decreased Paxil back down to 20 mg daily. Has not noticed any improvement in her hot flashes being on the Effexor - plans to stop this. Has been trying essential oils for her hot flashes, which seem to have helped more. Taking Calcium + D once daily; last Vit D level was low at 21 in 2/2019. Taking Zyrtec 10 mg daily year-round. Stopped taking Singulair 4-5 months ago, and has done well off of this. Taking OTC Omeprazole 20 mg daily for GERD - takes this daily to prevent heartburn. Has tried to go off of this a couple times, but the heartburn returns.     Still taking Femara 2.5 mg daily per Dr. Liliane Louise for h/o breast cancer; will be on this for approx 2 more years for 5 years total.    Seeing Dr. Pricila Saba every 6 months; taking Timoptic once daily in both eyes. Completed her 10-day course of Augmentin for sinusitis approx one week ago - feels at least 75% improved, but still has some lingering symptoms. Past Medical History:   Diagnosis Date    Cancer of right female breast ([address]     infiltrating ductal carcinoma T10 N0 M0 ER positive    Cataract of left eye     extraction 3/13/18    Cataract of right eye     extraction 2/15/18    Depression     GERD (gastroesophageal reflux disease)     Glaucoma     Dr Aisha Navarrete History of gastroenteritis 1994    Hospitalized for gastroenteritis and dehydration.  Hyperlipidemia     Hypertension     Controlled with diet and exercise.  OA (osteoarthritis)     Seasonal allergies       Past Surgical History:   Procedure Laterality Date    BREAST SURGERY Right 12/14/2015    lumpectomy with sentinal node biopsy    BUNIONECTOMY      Remote.  COLONOSCOPY  1/28/2013    Grade 1 internal hemorrhoids. Otherwise normal.    DILATION AND CURETTAGE OF UTERUS  12/29/2005    Hysteroscopy.  HEMORRHOID SURGERY      rubber banding by Dr. Yoav Hgiuera 10/28/16    INTRACAPSULAR CATARACT EXTRACTION Right 02/15/2018    Dr Melyssa Walters Left 03/13/2018    Dr López Sis ARTHROSCOPY Right 7/26/2002    Partial medial meniscectomy.  MENISCECTOMY Right 7/2002    Medial.    CA SONO GUIDE NEEDLE BIOPSY Right 11/20/2015    Right breast    TOE OSTEOTOMY Right 2009    Metatarsal osteotomy of right 1st digit.     TOTAL KNEE ARTHROPLASTY Left 11/9/2011    TOTAL KNEE ARTHROPLASTY Right 12/3/2010    UPPER GASTROINTESTINAL ENDOSCOPY  2/2004     Family History   Problem Relation Age of Onset    Dementia Mother     Breast Cancer Sister      Social History     Tobacco Use    Smoking status: Never Smoker    Smokeless tobacco: Never Used   Substance Use Topics    Alcohol use: No     Alcohol/week: 0.0 oz      Current Outpatient Medications   Medication Sig Dispense Refill    venlafaxine (EFFEXOR XR) 37.5 MG extended release capsule Take 1 capsule by mouth daily 30 capsule 3    letrozole (FEMARA) 2.5 MG tablet TAKE 1 TABLET DAILY 90 tablet 1    fenofibrate (TRICOR) 145 MG tablet TAKE 1 TABLET DAILY 90 tablet 3    atorvastatin (LIPITOR) 20 MG tablet TAKE 1 TABLET DAILY 90 tablet 3    PARoxetine (PAXIL) 20 MG tablet TAKE 1 TABLET EVERY MORNING 90 tablet 3    calcium citrate-vitamin D (CITRACAL+D) 315-200 MG-UNIT per tablet Take 1 tablet by mouth      hydrocortisone (ANUSOL-HC) 2.5 % rectal cream Place rectally 2 times daily as needed. 1 Tube 2    timolol (TIMOPTIC) 0.5 % ophthalmic solution       omeprazole (PRILOSEC) 20 MG capsule Take 1 capsule by mouth daily. 90 capsule 1    cetirizine (ZYRTEC) 10 MG tablet Take 10 mg by mouth daily.  PARoxetine (PAXIL) 40 MG tablet Take 1 tablet by mouth every morning 90 tablet 3     No current facility-administered medications for this visit. Allergies   Allergen Reactions    No Known Allergies      unknown       Health Maintenance   Topic Date Due    HIV screen  04/19/2021 (Originally 12/6/1970)    Breast cancer screen  06/28/2019    Cervical cancer screen  11/04/2021    Colon cancer screen colonoscopy  01/28/2023    Lipid screen  02/19/2024    DTaP/Tdap/Td vaccine (2 - Td) 05/09/2027    Flu vaccine  Completed    Shingles Vaccine  Completed    Hepatitis C screen  Completed    Pneumococcal 0-64 years Vaccine  Aged Out       Subjective:      Review of Systems   Constitutional: Negative for fever. HENT: Positive for sinus pressure (intermittent). Negative for hearing loss. Eyes: Negative for visual disturbance. Respiratory: Positive for cough (improved, but still present from recent sinusitis; sometimes productive, but improved from before). Cardiovascular: Negative for chest pain and palpitations. Gastrointestinal: Positive for blood in stool (occasional, due to hemorrhoids). Genitourinary: Negative for dysuria and hematuria. Skin: Negative for rash and wound. Neurological: Positive for headaches (sinus headache intermittently). Negative for dizziness and light-headedness. Psychiatric/Behavioral: Negative for suicidal ideas. The patient is nervous/anxious (stable). Objective:     Vitals:    04/19/19 1120   BP: 110/70   Pulse: 70   SpO2: 98%   Weight: 211 lb (95.7 kg)   Height: 5' 6\" (1.676 m)     Physical Exam   Constitutional: She is oriented to person, place, and time. She appears well-developed and well-nourished. No distress. HENT:   Head: Normocephalic and atraumatic. Nose: Mucosal edema present. Eyes: Conjunctivae are normal.   Neck: Neck supple. Cardiovascular: Normal rate, regular rhythm and normal heart sounds. Pulmonary/Chest: Effort normal and breath sounds normal. No respiratory distress. Abdominal: Soft. Bowel sounds are normal. She exhibits no distension. There is no tenderness. Musculoskeletal: She exhibits no edema. Neurological: She is alert and oriented to person, place, and time. Skin: Skin is warm and dry. Psychiatric: She has a normal mood and affect. Assessment:       Diagnosis Orders   1. Hyperlipidemia, unspecified hyperlipidemia type     2. Anxiety     3. Hot flashes     4. Vitamin D deficiency  Vitamin D 25 Hydroxy   5. Gastroesophageal reflux disease, esophagitis presence not specified     6. Allergic rhinitis, unspecified seasonality, unspecified trigger     7. Screening for diabetes mellitus  Glucose, Fasting         Plan:      Return in about 6 months (around 10/19/2019) for well woman exam with Pap. Orders Placed This Encounter   Procedures    Vitamin D 25 Hydroxy     Standing Status:   Future     Standing Expiration Date:   4/19/2020    Glucose, Fasting     Standing Status:   Future     Standing Expiration Date:   4/19/2020       Patient given educational materials - see patient instructions.   Discussed use,

## 2019-04-22 DIAGNOSIS — F32.A DEPRESSION, UNSPECIFIED DEPRESSION TYPE: ICD-10-CM

## 2019-04-22 RX ORDER — PAROXETINE HYDROCHLORIDE 20 MG/1
20 TABLET, FILM COATED ORAL EVERY MORNING
Qty: 90 TABLET | Refills: 3 | Status: CANCELLED | OUTPATIENT
Start: 2019-04-22

## 2019-04-22 NOTE — TELEPHONE ENCOUNTER
E-scribed 9-5-18 x 1 year. Called [facility]- they state pt has no refills left. Last dispensed from [employer] 3-7-19. Next appt 10-21-19.

## 2019-04-23 RX ORDER — PAROXETINE HYDROCHLORIDE 40 MG/1
40 TABLET, FILM COATED ORAL EVERY MORNING
Qty: 90 TABLET | Refills: 3 | Status: SHIPPED | OUTPATIENT
Start: 2019-04-23 | End: 2020-04-06

## 2019-04-23 NOTE — TELEPHONE ENCOUNTER
Sorry - I missed pt's note about the 40 mg. Yes, Gerry Beverly says she is doing well & confirmed she would like the 40 mg tab sent in so she doesn't have to take two 20 mg tabs.

## 2019-04-23 NOTE — TELEPHONE ENCOUNTER
MyChart refill request states that pt is now taking 40 mg daily of this med - does she want to increase to 40 mg tablet with this refill?

## 2019-07-01 ENCOUNTER — HOSPITAL ENCOUNTER (OUTPATIENT)
Dept: MAMMOGRAPHY | Age: 64
Discharge: HOME | End: 2019-07-03
Payer: COMMERCIAL

## 2019-07-01 DIAGNOSIS — Z12.31 SCREENING MAMMOGRAM, ENCOUNTER FOR: ICD-10-CM

## 2019-07-01 PROCEDURE — 77063 BREAST TOMOSYNTHESIS BI: CPT

## 2019-07-22 DIAGNOSIS — M25.562 CHRONIC PAIN OF LEFT KNEE: Primary | ICD-10-CM

## 2019-07-22 DIAGNOSIS — G89.29 CHRONIC PAIN OF LEFT KNEE: Primary | ICD-10-CM

## 2019-07-24 ENCOUNTER — HOSPITAL ENCOUNTER (OUTPATIENT)
Dept: GENERAL RADIOLOGY | Age: 64
Discharge: HOME | End: 2019-07-26
Payer: COMMERCIAL

## 2019-07-24 ENCOUNTER — OFFICE VISIT (OUTPATIENT)
Dept: ORTHOPEDIC SURGERY | Age: 64
End: 2019-07-24
Payer: COMMERCIAL

## 2019-07-24 VITALS
DIASTOLIC BLOOD PRESSURE: 74 MMHG | BODY MASS INDEX: 33.91 KG/M2 | WEIGHT: 211 LBS | HEART RATE: 68 BPM | HEIGHT: 66 IN | SYSTOLIC BLOOD PRESSURE: 108 MMHG

## 2019-07-24 DIAGNOSIS — Z96.652 HISTORY OF TOTAL LEFT KNEE REPLACEMENT: Primary | ICD-10-CM

## 2019-07-24 DIAGNOSIS — M25.562 CHRONIC PAIN OF LEFT KNEE: ICD-10-CM

## 2019-07-24 DIAGNOSIS — G89.29 CHRONIC PAIN OF LEFT KNEE: ICD-10-CM

## 2019-07-24 PROCEDURE — 73562 X-RAY EXAM OF KNEE 3: CPT

## 2019-07-24 PROCEDURE — 99203 OFFICE O/P NEW LOW 30 MIN: CPT | Performed by: FAMILY MEDICINE

## 2019-07-24 NOTE — PROGRESS NOTES
Sports Medicine Consultation     CHIEF COMPLAINT:  Knee Pain (left knee pain)      HPI:  Anjum Morris is a 58y.o. year old female who is a new patient being seen for regarding new problem left knee pain. The pain has been present for 11 day(s). The patient recalls a pain after stepping off the bleachers at a baseball injury. The patient has tried salve, ibu with improvement. The pain is described as sharp. There is  pain on weightbearing. The knee does not swell. There is is not painful popping and clicking. The knee does not catch or lock. It has given out. It is  stiff upon arising from sitting. It is  painful to go up and down stairs and sit for a prolonged period of time. she has a past medical history of Cancer of right female breast ([address] Cataract of left eye, Cataract of right eye, Depression, GERD (gastroesophageal reflux disease), Glaucoma, History of gastroenteritis, Hyperlipidemia, Hypertension, OA (osteoarthritis), and Seasonal allergies. she has a past surgical history that includes Total knee arthroplasty (Left, 11/9/2011); Total knee arthroplasty (Right, 12/3/2010); Toe Osteotomy (Right, 2009); Bunionectomy; Knee arthroscopy (Right, 7/26/2002); meniscectomy (Right, 7/2002); Dilation and curettage of uterus (12/29/2005); Upper gastrointestinal endoscopy (2/2004); Colonoscopy (1/28/2013); pr sono guide needle biopsy (Right, 11/20/2015); Breast surgery (Right, 12/14/2015); Hemorrhoid surgery; Intracapsular cataract extraction (Right, 02/15/2018); and Intracapsular cataract extraction (Left, 03/13/2018). family history includes Breast Cancer in her sister; Dementia in her mother.     Social History     Socioeconomic History    Marital status:      Spouse name: Not on file    Number of children: Not on file    Years of education: Not on file    Highest education level: Not on file   Occupational History    Not on file   Social Needs    Financial resource strain: Not on file    Food insecurity:     Worry: Not on file     Inability: Not on file    Transportation needs:     Medical: Not on file     Non-medical: Not on file   Tobacco Use    Smoking status: Never Smoker    Smokeless tobacco: Never Used   Substance and Sexual Activity    Alcohol use: No     Alcohol/week: 0.0 standard drinks    Drug use: No    Sexual activity: Not on file   Lifestyle    Physical activity:     Days per week: Not on file     Minutes per session: Not on file    Stress: Not on file   Relationships    Social connections:     Talks on phone: Not on file     Gets together: Not on file     Attends Restorationist service: Not on file     Active member of club or organization: Not on file     Attends meetings of clubs or organizations: Not on file     Relationship status: Not on file    Intimate partner violence:     Fear of current or ex partner: Not on file     Emotionally abused: Not on file     Physically abused: Not on file     Forced sexual activity: Not on file   Other Topics Concern    Not on file   Social History Narrative    Not on file       Current Outpatient Medications   Medication Sig Dispense Refill    PARoxetine (PAXIL) 40 MG tablet Take 1 tablet by mouth every morning 90 tablet 3    letrozole (FEMARA) 2.5 MG tablet TAKE 1 TABLET DAILY 90 tablet 1    fenofibrate (TRICOR) 145 MG tablet TAKE 1 TABLET DAILY 90 tablet 3    atorvastatin (LIPITOR) 20 MG tablet TAKE 1 TABLET DAILY 90 tablet 3    PARoxetine (PAXIL) 20 MG tablet TAKE 1 TABLET EVERY MORNING 90 tablet 3    calcium citrate-vitamin D (CITRACAL+D) 315-200 MG-UNIT per tablet Take 1 tablet by mouth      hydrocortisone (ANUSOL-HC) 2.5 % rectal cream Place rectally 2 times daily as needed. 1 Tube 2    timolol (TIMOPTIC) 0.5 % ophthalmic solution       omeprazole (PRILOSEC) 20 MG capsule Take 1 capsule by mouth daily. 90 capsule 1    cetirizine (ZYRTEC) 10 MG tablet Take 10 mg by mouth daily.        No

## 2019-07-29 ENCOUNTER — HOSPITAL ENCOUNTER (OUTPATIENT)
Dept: PHYSICAL THERAPY | Age: 64
Setting detail: THERAPIES SERIES
Discharge: HOME | End: 2019-07-29
Payer: COMMERCIAL

## 2019-07-29 PROCEDURE — 97035 APP MDLTY 1+ULTRASOUND EA 15: CPT | Performed by: PHYSICAL THERAPIST

## 2019-07-29 PROCEDURE — 97161 PT EVAL LOW COMPLEX 20 MIN: CPT | Performed by: PHYSICAL THERAPIST

## 2019-07-29 ASSESSMENT — PAIN DESCRIPTION - ONSET: ONSET: ON-GOING

## 2019-07-29 ASSESSMENT — PAIN DESCRIPTION - LOCATION: LOCATION: KNEE

## 2019-07-29 ASSESSMENT — PAIN DESCRIPTION - DESCRIPTORS: DESCRIPTORS: SHARP

## 2019-07-29 ASSESSMENT — PAIN DESCRIPTION - ORIENTATION: ORIENTATION: LEFT

## 2019-07-29 ASSESSMENT — PAIN DESCRIPTION - FREQUENCY: FREQUENCY: INTERMITTENT

## 2019-07-29 ASSESSMENT — PAIN - FUNCTIONAL ASSESSMENT: PAIN_FUNCTIONAL_ASSESSMENT: PREVENTS OR INTERFERES SOME ACTIVE ACTIVITIES AND ADLS

## 2019-07-29 ASSESSMENT — PAIN DESCRIPTION - PAIN TYPE: TYPE: ACUTE PAIN

## 2019-07-29 ASSESSMENT — PAIN DESCRIPTION - PROGRESSION: CLINICAL_PROGRESSION: GRADUALLY IMPROVING

## 2019-07-29 ASSESSMENT — PAIN SCALES - GENERAL: PAINLEVEL_OUTOF10: 5

## 2019-07-29 NOTE — PROGRESS NOTES
Physical Therapy  Initial Assessment  Date: 2019  Patient Name: Jefry Shankar  MRN: [id number]  : 1955     Treatment Diagnosis: L knee pain    Restrictions- Past TKA       Subjective   General  Chart Reviewed: Yes  Patient assessed for rehabilitation services?: Yes  Response To Previous Treatment: Not applicable  Family / Caregiver Present: No  Referring Practitioner: Dr Lenka Medina  Referral Date : 19  Diagnosis: L knee pain, Z96.652 history of L TKA  Follows Commands: Within Functional Limits  PT Visit Information  Onset Date: 19  PT Insurance Information: [employer]  Subjective  Subjective: Hurt L knee 19 while stepping down a tall bleacher. Pain is behind the knee and into the calf. Did not fall  Pain Screening  Patient Currently in Pain: Yes  Pain Assessment  Pain Assessment: 0-10  Pain Level: 5  Patient's Stated Pain Goal: 1  Pain Type: Acute pain  Pain Location: Knee  Pain Orientation: Left  Pain Radiating Towards: Post knee and calf  Pain Descriptors: Sharp  Pain Frequency: Intermittent  Pain Onset: On-going  Clinical Progression: Gradually improving  Functional Pain Assessment: Prevents or interferes some active activities and ADLs  Vital Signs  Patient Currently in Pain: Yes    Orientation  Orientation  Overall Orientation Status: Within Normal Limits    Social/Functional History  Social/Functional History  Lives With: Spouse  Type of Home: House  Home Layout: Two level  Bathroom Accessibility: Accessible  ADL Assistance: Independent  Homemaking Assistance: Independent  Ambulation Assistance: Independent  Transfer Assistance: Independent  Active : Yes  Mode of Transportation: Car  Occupation: Retired    Objective     Observation/Palpation  Posture: Good  Observation: Minimal limp at regular pace.  Gets sore with prolonged walking    PROM LLE (degrees)  L Knee Flexion 0-145: 120  L Knee Extension 0: -2 +pain, post knee  L Ankle Dorsiflexion 0-20: 10 +pain in

## 2019-07-29 NOTE — FLOWSHEET NOTE
Physical Therapy Daily Treatment Note    Date:  2019    Patient Name:  Maday Schaffer    :  1955  MRN: [id number]  Restrictions/Precautions:     Medical/Treatment Diagnosis Information:   · Diagnosis: L knee pain, Z96.652 history of L TKA  · Treatment Diagnosis: L knee pain  Insurance/Certification information:  PT Insurance Information: Aetna  Physician Information:  Referring Practitioner: Dr Helder Calabrese of care signed (Y/N):  n  Visit# / total visits:  1/10  Pain level: 5/10     Time In:10:10   Time Out:10:45    Progress Note: [x]  Yes  []  No  Next due by: Visit #10, or 19      Subjective:   See eval    Objective: See eval  Observation:   Test measurements:      Exercises: there ex for ROM, flexibility, strength  Exercise/Equipment Resistance/Repetitions Other comments   Incline stretch     Gastroc/ soleus stretch 10x, 10\"    TR/HR 10x    Squat matrix     Step up  Front/ lat/ retro                                  US 8' L gastroc   STM 3' L gastroc        [x] Provided verbal/tactile cueing for activities related to strengthening, flexibility, endurance, ROM. (49706)  [] Provided verbal/tactile cueing for activities related to improving balance, coordination, kinesthetic sense, posture, motor skill, proprioception. (18130)    Therapeutic Activities:     [] Therapeutic activities, direct (one-on-one) patient contact (use of dynamic activities to improve functional performance). (64813)    Gait:   [] Provided training and instruction to the patient for ambulation re-education. (81739)    Self-Care/ADL's  [] Self-care/home management training and compensatory training, meal preparation, safety procedures, and instructions in use of assistive technology devices/adaptive equipment, direct one-on-one contact.  (61950)    Home Exercise Program:   gastroc stretch, toe raise  [x] Reviewed/Progressed HEP activities related to strengthening, flexibility, endurance, ROM. (84107)  [] Reviewed/Progressed HEP activities related to improving balance, coordination, kinesthetic sense, posture, motor skill, proprioception.  (27397)    Manual Treatments:    [] Provided manual therapy to mobilize soft tissue/joints for the purpose of modulating pain, promoting relaxation,  increasing ROM, reducing/eliminating soft tissue swelling/inflammation/restriction, improving soft tissue extensibility.  (05600)    Service Based Modalities:      Timed Code Treatment Minutes:   [address] [facility] 1.5w/cm2 for soft tissue healing    Total Treatment Minutes:   8'    Treatment/Activity Tolerance:  [x] Patient tolerated treatment well [] Patient limited by fatique  [] Patient limited by pain  [] Patient limited by other medical complications  [] Other:     Prognosis: [x] Good [] Fair  [] Poor    Patient Requires Follow-up: [x] Yes  [] No      Goals:  Short term goals  Time Frame for Short term goals: 1 week  Short term goal 1: Start HEP  Short term goal 2: Start modalities for soft tissue healing    Long term goals  Time Frame for Long term goals : 4 weeks  Long term goal 1: L knee calf pain 1-2/10  Long term goal 2: Walk 1 hr without pain increase  Long term goal 3: Walk down steps in regular reciprocal fashion  Long term goal 4: Resume regular level of housekeeping          Plan:   [] Continue per plan of care [] Alter current plan (see comments)  [x] Plan of care initiated [] Hold pending MD visit [] Discharge  Plan for Next Session:   IFC if needed    Electronically signed by:  John Reyes

## 2019-08-02 ENCOUNTER — HOSPITAL ENCOUNTER (OUTPATIENT)
Dept: PHYSICAL THERAPY | Age: 64
Setting detail: THERAPIES SERIES
Discharge: HOME | End: 2019-08-02
Payer: COMMERCIAL

## 2019-08-02 PROCEDURE — 97110 THERAPEUTIC EXERCISES: CPT

## 2019-08-02 NOTE — FLOWSHEET NOTE
Self-care/home management training and compensatory training, meal preparation, safety procedures, and instructions in use of assistive technology devices/adaptive equipment, direct one-on-one contact. (07233)    Home Exercise Program:   gastroc stretch, toe raise  [x] Reviewed/Progressed HEP activities related to strengthening, flexibility, endurance, ROM. (76918)  [] Reviewed/Progressed HEP activities related to improving balance, coordination, kinesthetic sense, posture, motor skill, proprioception.  (66525)    Manual Treatments:    [] Provided manual therapy to mobilize soft tissue/joints for the purpose of modulating pain, promoting relaxation,  increasing ROM, reducing/eliminating soft tissue swelling/inflammation/restriction, improving soft tissue extensibility.  (91887)    Service Based Modalities:  US x8', 1mHz, 2.0 w/cm2 for soft tissue healing  (no charge)    Timed Code Treatment Minutes:   therex 26' (2 units)    Total Treatment Minutes:   29'    Treatment/Activity Tolerance:  [x] Patient tolerated treatment well [] Patient limited by fatique  [] Patient limited by pain  [] Patient limited by other medical complications  [] Other:     Prognosis: [x] Good [] Fair  [] Poor    Patient Requires Follow-up: [x] Yes  [] No    Goals:  Short term goals  Time Frame for Short term goals: 1 week  Short term goal 1: Start HEP met  Short term goal 2: Start modalities for soft tissue healing met    Long term goals  Time Frame for Long term goals : 4 weeks  Long term goal 1: L knee calf pain 1-2/10  Long term goal 2: Walk 1 hr without pain increase  Long term goal 3: Walk down steps in regular reciprocal fashion  Long term goal 4: Resume regular level of housekeeping    Plan:   [x] Continue per plan of care [] Alter current plan (see comments)  [] Plan of care initiated [] Hold pending MD visit [] Discharge    Plan for Next Session:   IFC if needed    Electronically signed by:  Janey Owen

## 2019-08-06 ENCOUNTER — HOSPITAL ENCOUNTER (OUTPATIENT)
Dept: PHYSICAL THERAPY | Age: 64
Setting detail: THERAPIES SERIES
Discharge: HOME | End: 2019-08-06
Payer: COMMERCIAL

## 2019-08-06 PROCEDURE — 97035 APP MDLTY 1+ULTRASOUND EA 15: CPT

## 2019-08-06 PROCEDURE — 97110 THERAPEUTIC EXERCISES: CPT

## 2019-08-06 NOTE — FLOWSHEET NOTE
Provided training and instruction to the patient for ambulation re-education. (28885)    Self-Care/ADL's  [] Self-care/home management training and compensatory training, meal preparation, safety procedures, and instructions in use of assistive technology devices/adaptive equipment, direct one-on-one contact. (71275)    Home Exercise Program:   gastroc stretch, toe raise  [x] Reviewed/Progressed HEP activities related to strengthening, flexibility, endurance, ROM. (53777)  [] Reviewed/Progressed HEP activities related to improving balance, coordination, kinesthetic sense, posture, motor skill, proprioception.  (04368)    Manual Treatments:    [] Provided manual therapy to mobilize soft tissue/joints for the purpose of modulating pain, promoting relaxation,  increasing ROM, reducing/eliminating soft tissue swelling/inflammation/restriction, improving soft tissue extensibility.  (78655)    Service Based Modalities:  US x8', 1mHz, 2.0 w/cm2 for soft tissue healing     Timed Code Treatment Minutes:   therex 32'     Total Treatment Minutes:   44'    Treatment/Activity Tolerance:  [x] Patient tolerated treatment well [] Patient limited by fatique  [] Patient limited by pain  [] Patient limited by other medical complications  [] Other:     Prognosis: [x] Good [] Fair  [] Poor    Patient Requires Follow-up: [x] Yes  [] No    Goals:  Short term goals  Time Frame for Short term goals: 1 week  Short term goal 1: Start HEP met  Short term goal 2: Start modalities for soft tissue healing met    Long term goals  Time Frame for Long term goals : 4 weeks  Long term goal 1: L knee calf pain 1-2/10 met  Long term goal 2: Walk 1 hr without pain increase  Long term goal 3: Walk down steps in regular reciprocal fashion  Long term goal 4: Resume regular level of housekeeping    Plan:   [x] Continue per plan of care [] Alter current plan (see comments)  [] Plan of care initiated [] Hold pending MD visit [] Discharge    Plan for Next

## 2019-08-09 ENCOUNTER — HOSPITAL ENCOUNTER (OUTPATIENT)
Dept: PHYSICAL THERAPY | Age: 64
Setting detail: THERAPIES SERIES
Discharge: HOME | End: 2019-08-09
Payer: COMMERCIAL

## 2019-08-09 PROCEDURE — 97110 THERAPEUTIC EXERCISES: CPT

## 2019-09-16 ENCOUNTER — OFFICE VISIT (OUTPATIENT)
Dept: ONCOLOGY | Age: 64
End: 2019-09-16
Payer: COMMERCIAL

## 2019-09-16 VITALS
WEIGHT: 211.2 LBS | DIASTOLIC BLOOD PRESSURE: 76 MMHG | BODY MASS INDEX: 33.94 KG/M2 | HEIGHT: 66 IN | OXYGEN SATURATION: 97 % | HEART RATE: 78 BPM | SYSTOLIC BLOOD PRESSURE: 116 MMHG | RESPIRATION RATE: 16 BRPM

## 2019-09-16 DIAGNOSIS — Z17.0 MALIGNANT NEOPLASM OF LOWER-OUTER QUADRANT OF RIGHT BREAST OF FEMALE, ESTROGEN RECEPTOR POSITIVE (HCC): Primary | ICD-10-CM

## 2019-09-16 DIAGNOSIS — C50.511 MALIGNANT NEOPLASM OF LOWER-OUTER QUADRANT OF RIGHT BREAST OF FEMALE, ESTROGEN RECEPTOR POSITIVE (HCC): Primary | ICD-10-CM

## 2019-09-16 PROCEDURE — 99214 OFFICE O/P EST MOD 30 MIN: CPT | Performed by: INTERNAL MEDICINE

## 2019-09-16 RX ORDER — LETROZOLE 2.5 MG/1
TABLET, FILM COATED ORAL
Qty: 90 TABLET | Refills: 3 | Status: SHIPPED | OUTPATIENT
Start: 2019-09-16 | End: 2020-09-25

## 2019-09-16 NOTE — PROGRESS NOTES
EVERY MORNING 90 tablet 3    calcium citrate-vitamin D (CITRACAL+D) 315-200 MG-UNIT per tablet Take 1 tablet by mouth      hydrocortisone (ANUSOL-HC) 2.5 % rectal cream Place rectally 2 times daily as needed. 1 Tube 2    timolol (TIMOPTIC) 0.5 % ophthalmic solution       omeprazole (PRILOSEC) 20 MG capsule Take 1 capsule by mouth daily. 90 capsule 1    cetirizine (ZYRTEC) 10 MG tablet Take 10 mg by mouth daily. No current facility-administered medications for this visit. REVIEW OF SYSTEM:   Constitutional: No fever or chills. No night sweats, no weight loss   Eyes: No eye discharge, double vision, or eye pain   HEENT: negative for sore mouth, sore throat, hoarseness and voice change   Respiratory: negative for cough , sputum, dyspnea, wheezing, hemoptysis, chest pain   Cardiovascular: negative for chest pain, dyspnea, palpitations, orthopnea, PND   Gastrointestinal: negative for nausea, vomiting, diarrhea, constipation, abdominal pain, Dysphagia, hematemesis and hematochezia   Genitourinary: negative for frequency, dysuria, nocturia, urinary incontinence, and hematuria   Integument: negative for rash, skin lesions, bruises.    Hematologic/Lymphatic: negative for easy bruising, bleeding, lymphadenopathy, petechiae and swelling/edema   Endocrine: negative for heat or cold intolerance, tremor, weight changes, change in bowel habits and hair loss   Musculoskeletal: negative for myalgias, arthralgias, pain, joint swelling,and bone pain   Neurological: negative for headaches, dizziness, seizures, weakness, numbness     OBJECTIVE:         Vitals:    09/16/19 1126   BP: 116/76   Pulse: 78   Resp: 16   SpO2: 97%   PHYSICAL EXAM:   General appearance - well appearing, no in pain or distress   Mental status - alert and cooperative   Eyes - pupils equal and reactive, extraocular eye movements intact   Ears - bilateral TM's and external ear canals normal   Mouth - mucous membranes moist, pharynx normal without  ATYPICAL LOBULAR HYPERPLASIA AND LOBULAR CARCINOMA IN SITU. -  THE SURGICAL MARGINS ARE NEGATIVE FOR INVASIVE DUCTAL     CARCINOMA AND DCIS (THE CLOSEST MARGIN IS 0.5 CM FROM THE TUMOR). 2.  SENTINEL LYMPH NODE, EXCISIONAL BIOPSY:       -  NEGATIVE FOR MALIGNANCY (0/1). 3.  SENTINEL LYMPH NODE, EXCISIONAL BIOPSY:       -  NEGATIVE FOR MALIGNANCY (0/1). 4.  SENTINEL LYMPH NODE, EXCISIONAL BIOPSY:       -  NEGATIVE FOR MALIGNANCY (0/1). IMAGING DATA:    Bilateral screening mammogram 7/1/2019:  Impression   Benign findings.       BI-RADS 2       BIRADS:   BIRADS - CATEGORY 2       Benign, no evidence of malignancy.  Normal interval follow-up is recommended   in 12 months. Dexa sacn 6/28/18  Impression   1. Findings above consistent with osteopenia by WHO criteria. 2. Fracture risk is moderate.  Treatment is advised. ASSESSMENT:    This is a 18-year-old female with right-sided breast cancer status post lumpectomy followed by radiation therapy and currently on adjuvant endocrine therapy since March 2016. She is tolerating Femara very well except mild hot flashes which seems to be getting better. She has tried Effexor for about a month but then stopped due to side effects. She is using essential oils. She thinks her heart flashes are tolerable. At this point there is no evidence of recurrence. I discussed the risk-benefit and she will continue endocrine therapy for 5-10 years. I discussed the option of getting breast cancer index. No evidence of recurrence. PLAN:   We will get Labs today  She will get mammogram and bone density scan in June/July 2020  Continue Femara  RTc 6 months or earlier if needed    I spent more than 25 minutes examining, evaluating, reviewing data and counseling the patient. Greater than 50% of that time was spent face-to-face with the patient in counseling and coordinating her care.     Prabha Palacio MD  Hematology/Oncology

## 2019-10-15 ENCOUNTER — HOSPITAL ENCOUNTER (OUTPATIENT)
Dept: LAB | Age: 64
Discharge: HOME | End: 2019-10-15
Payer: COMMERCIAL

## 2019-10-15 DIAGNOSIS — Z13.1 SCREENING FOR DIABETES MELLITUS: ICD-10-CM

## 2019-10-15 DIAGNOSIS — E55.9 VITAMIN D DEFICIENCY: ICD-10-CM

## 2019-10-15 LAB — GLUCOSE FASTING: 105 MG/DL (ref 70–99)

## 2019-10-15 PROCEDURE — 82947 ASSAY GLUCOSE BLOOD QUANT: CPT

## 2019-10-15 PROCEDURE — 82306 VITAMIN D 25 HYDROXY: CPT

## 2019-10-15 PROCEDURE — 36415 COLL VENOUS BLD VENIPUNCTURE: CPT

## 2019-10-16 LAB — VITAMIN D 25-HYDROXY: 23.6 NG/ML (ref 30–100)

## 2019-10-21 ENCOUNTER — HOSPITAL ENCOUNTER (OUTPATIENT)
Age: 64
Setting detail: SPECIMEN
Discharge: HOME | End: 2019-10-21
Payer: COMMERCIAL

## 2019-10-21 ENCOUNTER — OFFICE VISIT (OUTPATIENT)
Dept: FAMILY MEDICINE CLINIC | Age: 64
End: 2019-10-21
Payer: COMMERCIAL

## 2019-10-21 VITALS
HEIGHT: 66 IN | BODY MASS INDEX: 33.75 KG/M2 | WEIGHT: 210 LBS | SYSTOLIC BLOOD PRESSURE: 130 MMHG | DIASTOLIC BLOOD PRESSURE: 70 MMHG | HEART RATE: 86 BPM | OXYGEN SATURATION: 96 %

## 2019-10-21 DIAGNOSIS — E55.9 VITAMIN D DEFICIENCY: ICD-10-CM

## 2019-10-21 DIAGNOSIS — R73.01 IMPAIRED FASTING GLUCOSE: ICD-10-CM

## 2019-10-21 DIAGNOSIS — Z01.419 ENCOUNTER FOR WELL WOMAN EXAM WITH ROUTINE GYNECOLOGICAL EXAM: ICD-10-CM

## 2019-10-21 DIAGNOSIS — Z12.4 PAP SMEAR FOR CERVICAL CANCER SCREENING: ICD-10-CM

## 2019-10-21 DIAGNOSIS — E78.5 HYPERLIPIDEMIA, UNSPECIFIED HYPERLIPIDEMIA TYPE: ICD-10-CM

## 2019-10-21 DIAGNOSIS — Z01.419 ENCOUNTER FOR WELL WOMAN EXAM WITH ROUTINE GYNECOLOGICAL EXAM: Primary | ICD-10-CM

## 2019-10-21 PROCEDURE — G0145 SCR C/V CYTO,THINLAYER,RESCR: HCPCS

## 2019-10-21 PROCEDURE — 99396 PREV VISIT EST AGE 40-64: CPT | Performed by: FAMILY MEDICINE

## 2019-10-21 PROCEDURE — 87624 HPV HI-RISK TYP POOLED RSLT: CPT

## 2019-10-21 ASSESSMENT — PATIENT HEALTH QUESTIONNAIRE - PHQ9
SUM OF ALL RESPONSES TO PHQ QUESTIONS 1-9: 0
1. LITTLE INTEREST OR PLEASURE IN DOING THINGS: 0
2. FEELING DOWN, DEPRESSED OR HOPELESS: 0
SUM OF ALL RESPONSES TO PHQ9 QUESTIONS 1 & 2: 0
SUM OF ALL RESPONSES TO PHQ QUESTIONS 1-9: 0

## 2019-10-21 ASSESSMENT — ENCOUNTER SYMPTOMS
SHORTNESS OF BREATH: 0
BLOOD IN STOOL: 0
VOMITING: 0

## 2019-10-24 ENCOUNTER — TELEPHONE (OUTPATIENT)
Dept: FAMILY MEDICINE CLINIC | Age: 64
End: 2019-10-24

## 2019-10-24 LAB
CYTOLOGY REPORT: NORMAL
HPV SAMPLE: NORMAL
HPV, GENOTYPE 16: NOT DETECTED
HPV, GENOTYPE 18: NOT DETECTED
HPV, HIGH RISK OTHER: NOT DETECTED
HPV, INTERPRETATION: NORMAL
SPECIMEN DESCRIPTION: NORMAL

## 2020-01-04 RX ORDER — ATORVASTATIN CALCIUM 20 MG/1
TABLET, FILM COATED ORAL
Qty: 90 TABLET | Refills: 0 | Status: SHIPPED | OUTPATIENT
Start: 2020-01-04 | End: 2020-04-06

## 2020-03-30 ENCOUNTER — TELEPHONE (OUTPATIENT)
Dept: ONCOLOGY | Age: 65
End: 2020-03-30

## 2020-04-06 RX ORDER — FENOFIBRATE 145 MG/1
TABLET, COATED ORAL
Qty: 90 TABLET | Refills: 0 | Status: SHIPPED | OUTPATIENT
Start: 2020-04-06 | End: 2020-06-29

## 2020-04-06 RX ORDER — PAROXETINE HYDROCHLORIDE 40 MG/1
TABLET, FILM COATED ORAL
Qty: 90 TABLET | Refills: 3 | Status: SHIPPED | OUTPATIENT
Start: 2020-04-06 | End: 2021-04-27 | Stop reason: SDUPTHER

## 2020-04-06 RX ORDER — ATORVASTATIN CALCIUM 20 MG/1
TABLET, FILM COATED ORAL
Qty: 90 TABLET | Refills: 0 | Status: SHIPPED | OUTPATIENT
Start: 2020-04-06 | End: 2020-09-01

## 2020-04-13 ENCOUNTER — HOSPITAL ENCOUNTER (OUTPATIENT)
Dept: LAB | Age: 65
Discharge: HOME | End: 2020-04-13
Payer: MEDICARE

## 2020-04-13 LAB
CHOLESTEROL/HDL RATIO: 3.9
CHOLESTEROL: 170 MG/DL
ESTIMATED AVERAGE GLUCOSE: 111 MG/DL
HBA1C MFR BLD: 5.5 % (ref 4.8–5.9)
HDLC SERPL-MCNC: 44 MG/DL
LDL CHOLESTEROL: 90 MG/DL (ref 0–130)
TRIGL SERPL-MCNC: 179 MG/DL
VITAMIN D 25-HYDROXY: 27.5 NG/ML (ref 30–100)
VLDLC SERPL CALC-MCNC: ABNORMAL MG/DL (ref 1–30)

## 2020-04-13 PROCEDURE — 80061 LIPID PANEL: CPT

## 2020-04-13 PROCEDURE — 36415 COLL VENOUS BLD VENIPUNCTURE: CPT

## 2020-04-13 PROCEDURE — 82306 VITAMIN D 25 HYDROXY: CPT

## 2020-04-13 PROCEDURE — 83036 HEMOGLOBIN GLYCOSYLATED A1C: CPT

## 2020-04-20 ENCOUNTER — VIRTUAL VISIT (OUTPATIENT)
Dept: FAMILY MEDICINE CLINIC | Age: 65
End: 2020-04-20
Payer: MEDICARE

## 2020-04-20 VITALS — WEIGHT: 212 LBS | BODY MASS INDEX: 33.27 KG/M2 | HEIGHT: 67 IN | TEMPERATURE: 97.7 F

## 2020-04-20 PROCEDURE — 99214 OFFICE O/P EST MOD 30 MIN: CPT | Performed by: FAMILY MEDICINE

## 2020-04-20 ASSESSMENT — ENCOUNTER SYMPTOMS
SORE THROAT: 0
BLOOD IN STOOL: 0
COUGH: 0
SHORTNESS OF BREATH: 0

## 2020-04-20 ASSESSMENT — PATIENT HEALTH QUESTIONNAIRE - PHQ9
1. LITTLE INTEREST OR PLEASURE IN DOING THINGS: 0
SUM OF ALL RESPONSES TO PHQ9 QUESTIONS 1 & 2: 0
SUM OF ALL RESPONSES TO PHQ QUESTIONS 1-9: 0
SUM OF ALL RESPONSES TO PHQ QUESTIONS 1-9: 0
2. FEELING DOWN, DEPRESSED OR HOPELESS: 0

## 2020-04-20 NOTE — PROGRESS NOTES
fasting glucose    5. Gastroesophageal reflux disease, esophagitis presence not specified    6. Hot flashes      Return for Follow-up. Yary Whitney is a 71 y.o. female being evaluated by a Virtual Visit (video visit) encounter to address concerns as mentioned above. A caregiver was present when appropriate. Due to this being a TeleHealth encounter (During FZNDD-68 public health emergency), evaluation of the following organ systems was limited: Vitals/Constitutional/EENT/Resp/CV/GI//MS/Neuro/Skin/Heme-Lymph-Imm. Pursuant to the emergency declaration under the [address] [place] [address], [address] [place] [address] authority and the [employer] Act, this Virtual Visit was conducted with patient's (and/or legal guardian's) consent, to reduce the patient's risk of exposure to COVID-19 and provide necessary medical care. The patient (and/or legal guardian) has also been advised to contact this office for worsening conditions or problems, and seek emergency medical treatment and/or call 911 if deemed necessary. Services were provided through a video synchronous discussion virtually to substitute for in-person clinic visit. Patient was located at her home, and provider was located at [facility]. --Lino Palm DO on 4/26/2020 at 11:45 PM    An electronic signature was used to authenticate this note.

## 2020-04-27 ENCOUNTER — HOSPITAL ENCOUNTER (OUTPATIENT)
Dept: LAB | Age: 65
Discharge: HOME | End: 2020-04-27
Payer: MEDICARE

## 2020-04-27 ENCOUNTER — OFFICE VISIT (OUTPATIENT)
Dept: ONCOLOGY | Age: 65
End: 2020-04-27
Payer: MEDICARE

## 2020-04-27 VITALS
WEIGHT: 213.4 LBS | DIASTOLIC BLOOD PRESSURE: 60 MMHG | TEMPERATURE: 98.4 F | SYSTOLIC BLOOD PRESSURE: 130 MMHG | HEART RATE: 68 BPM | BODY MASS INDEX: 33.49 KG/M2 | OXYGEN SATURATION: 97 % | HEIGHT: 67 IN

## 2020-04-27 LAB
ABSOLUTE EOS #: 0.25 K/UL (ref 0–0.44)
ABSOLUTE IMMATURE GRANULOCYTE: <0.03 K/UL (ref 0–0.3)
ABSOLUTE LYMPH #: 1.75 K/UL (ref 1.1–3.7)
ABSOLUTE MONO #: 0.48 K/UL (ref 0.1–1.2)
ALBUMIN SERPL-MCNC: 4.3 G/DL (ref 3.5–5.2)
ALBUMIN/GLOBULIN RATIO: 1.4 (ref 1–2.5)
ALP BLD-CCNC: 68 U/L (ref 35–104)
ALT SERPL-CCNC: 12 U/L (ref 5–33)
ANION GAP SERPL CALCULATED.3IONS-SCNC: 12 MMOL/L (ref 9–17)
AST SERPL-CCNC: 21 U/L
BASOPHILS # BLD: 1 % (ref 0–2)
BASOPHILS ABSOLUTE: 0.04 K/UL (ref 0–0.2)
BILIRUB SERPL-MCNC: 0.24 MG/DL (ref 0.3–1.2)
BUN BLDV-MCNC: 10 MG/DL (ref 8–23)
BUN/CREAT BLD: 13 (ref 9–20)
CALCIUM SERPL-MCNC: 9.4 MG/DL (ref 8.6–10.4)
CHLORIDE BLD-SCNC: 106 MMOL/L (ref 98–107)
CO2: 27 MMOL/L (ref 20–31)
CREAT SERPL-MCNC: 0.8 MG/DL (ref 0.5–0.9)
DIFFERENTIAL TYPE: ABNORMAL
EOSINOPHILS RELATIVE PERCENT: 5 % (ref 1–4)
GFR AFRICAN AMERICAN: >60 ML/MIN
GFR NON-AFRICAN AMERICAN: >60 ML/MIN
GFR SERPL CREATININE-BSD FRML MDRD: ABNORMAL ML/MIN/{1.73_M2}
GFR SERPL CREATININE-BSD FRML MDRD: ABNORMAL ML/MIN/{1.73_M2}
GLUCOSE BLD-MCNC: 105 MG/DL (ref 70–99)
HCT VFR BLD CALC: 38.9 % (ref 36.3–47.1)
HEMOGLOBIN: 12.5 G/DL (ref 11.9–15.1)
IMMATURE GRANULOCYTES: 0 %
LYMPHOCYTES # BLD: 35 % (ref 24–43)
MCH RBC QN AUTO: 28.1 PG (ref 25.2–33.5)
MCHC RBC AUTO-ENTMCNC: 32.1 G/DL (ref 25.2–33.5)
MCV RBC AUTO: 87.4 FL (ref 82.6–102.9)
MONOCYTES # BLD: 10 % (ref 3–12)
NRBC AUTOMATED: 0 PER 100 WBC
PDW BLD-RTO: 13.2 % (ref 11.8–14.4)
PLATELET # BLD: 304 K/UL (ref 138–453)
PLATELET ESTIMATE: ABNORMAL
PMV BLD AUTO: 10.4 FL (ref 8.1–13.5)
POTASSIUM SERPL-SCNC: 3.7 MMOL/L (ref 3.7–5.3)
RBC # BLD: 4.45 M/UL (ref 3.95–5.11)
RBC # BLD: ABNORMAL 10*6/UL
SEG NEUTROPHILS: 49 % (ref 36–65)
SEGMENTED NEUTROPHILS ABSOLUTE COUNT: 2.45 K/UL (ref 1.5–8.1)
SODIUM BLD-SCNC: 145 MMOL/L (ref 135–144)
TOTAL PROTEIN: 7.3 G/DL (ref 6.4–8.3)
WBC # BLD: 5 K/UL (ref 3.5–11.3)
WBC # BLD: ABNORMAL 10*3/UL

## 2020-04-27 PROCEDURE — 36415 COLL VENOUS BLD VENIPUNCTURE: CPT

## 2020-04-27 PROCEDURE — 80053 COMPREHEN METABOLIC PANEL: CPT

## 2020-04-27 PROCEDURE — 99214 OFFICE O/P EST MOD 30 MIN: CPT | Performed by: INTERNAL MEDICINE

## 2020-04-27 PROCEDURE — 85025 COMPLETE CBC W/AUTO DIFF WBC: CPT

## 2020-04-27 NOTE — PROGRESS NOTES
Patient ID: Audrey Morataya, [birth year], [id number], 71 y.o. Diagnosis:   Right-sided breast cancer,T1cN0, status post lumpectomy with sentinel lymph node biopsy in December 2015  Adjuvant radiation therapy completed in February 2016  Started on Femara March 2016  HISTORY OF PRESENT ILLNESS:    Oncologic History: This is a 57-year-old female who was diagnosed with right-sided breast cancer in November 2015 and subsequently had lumpectomy with sentinel lymph node biopsy in December 2015. The pathology was positive for well-differentiated ductal carcinoma measuring 1.6 cm with low-grade ductal carcinoma in situ. There was also atypical lobular hyperplasia and lobular carcinoma in situ noted. Final surgical pathology showed P T1cp N0 disease, ER/OK positive and HER-2/александр negative. Patient completed radiation therapy in February 2016 and started on endocrine therapy with Femara in March 2016. Interval history:  Patient is returning for follow-up visit. She is tolerating Femara well. She denied any unusual side effects. No new bone pain or back pain. She has mild hot flashes but are tolerable. Her bone density scan in June showed osteopenia. She denied any new lumps or bumps in her breast.  No unintentional weight loss, new bone pain or back pain. During this visit patient's allergy, social, medical, surgical history and medications were reviewed and updated.   Allergies   Allergen Reactions    No Known Allergies      unknown       Current Outpatient Medications   Medication Sig Dispense Refill    atorvastatin (LIPITOR) 20 MG tablet TAKE 1 TABLET DAILY 90 tablet 0    PARoxetine (PAXIL) 40 MG tablet TAKE 1 TABLET EVERY MORNING 90 tablet 3    fenofibrate (TRICOR) 145 MG tablet TAKE 1 TABLET DAILY 90 tablet 0    letrozole (FEMARA) 2.5 MG tablet TAKE 1 TABLET DAILY 90 tablet 3    calcium citrate-vitamin D (CITRACAL+D) 315-200 MG-UNIT per tablet Take 1 tablet by mouth      timolol EXCISIONAL BIOPSY:       -  NEGATIVE FOR MALIGNANCY (0/1). 3.  SENTINEL LYMPH NODE, EXCISIONAL BIOPSY:       -  NEGATIVE FOR MALIGNANCY (0/1). 4.  SENTINEL LYMPH NODE, EXCISIONAL BIOPSY:       -  NEGATIVE FOR MALIGNANCY (0/1). IMAGING DATA:    Bilateral screening mammogram 7/1/2019:  Impression   Benign findings.       BI-RADS 2       BIRADS:   BIRADS - CATEGORY 2       Benign, no evidence of malignancy.  Normal interval follow-up is recommended   in 12 months. Dexa sacn 6/28/18  Impression   1. Findings above consistent with osteopenia by WHO criteria. 2. Fracture risk is moderate.  Treatment is advised. ASSESSMENT:    This is a 78-year-old female with right-sided breast cancer status post lumpectomy followed by radiation therapy and currently on adjuvant endocrine therapy since March 2016. She is tolerating Femara very well except mild hot flashes which seems to be getting better. She has tried Effexor for about a month but then stopped due to side effects. She is using essential oils. She thinks her heart flashes are tolerable. At this point there is no evidence of recurrence. I discussed the risk-benefit and she will continue endocrine therapy for 5-10 years. I discussed the option of getting breast cancer index. No evidence of recurrence. PLAN:   We will get Labs today  She will get mammogram and bone density scan in June/July 2020  Continue Femara  RTc 6 months or earlier if needed    I spent more than 25 minutes examining, evaluating, reviewing data and counseling the patient. Greater than 50% of that time was spent face-to-face with the patient in counseling and coordinating her care.     Ying Palacio MD  Hematology/Oncology

## 2020-06-10 ENCOUNTER — OFFICE VISIT (OUTPATIENT)
Dept: INTERNAL MEDICINE | Age: 65
End: 2020-06-10
Payer: MEDICARE

## 2020-06-10 ENCOUNTER — TELEPHONE (OUTPATIENT)
Dept: ADMINISTRATIVE | Age: 65
End: 2020-06-10

## 2020-06-10 ENCOUNTER — HOSPITAL ENCOUNTER (OUTPATIENT)
Age: 65
Setting detail: SPECIMEN
Discharge: HOME | End: 2020-06-10
Payer: MEDICARE

## 2020-06-10 ENCOUNTER — OFFICE VISIT (OUTPATIENT)
Dept: PRIMARY CARE CLINIC | Age: 65
End: 2020-06-10
Payer: MEDICARE

## 2020-06-10 VITALS
HEART RATE: 76 BPM | TEMPERATURE: 96.6 F | DIASTOLIC BLOOD PRESSURE: 74 MMHG | SYSTOLIC BLOOD PRESSURE: 112 MMHG | BODY MASS INDEX: 33.49 KG/M2 | RESPIRATION RATE: 16 BRPM | WEIGHT: 213.41 LBS | HEIGHT: 67 IN

## 2020-06-10 LAB
-: NORMAL
AMORPHOUS: NORMAL
BACTERIA: NORMAL
BILIRUBIN URINE: NEGATIVE
CASTS UA: NORMAL /LPF (ref 0–2)
COLOR: ABNORMAL
COMMENT UA: ABNORMAL
CRYSTALS, UA: NORMAL /HPF
EPITHELIAL CELLS UA: NORMAL /HPF (ref 0–5)
GLUCOSE URINE: NEGATIVE
KETONES, URINE: NEGATIVE
LEUKOCYTE ESTERASE, URINE: NEGATIVE
MUCUS: NORMAL
NITRITE, URINE: NEGATIVE
OTHER OBSERVATIONS UA: NORMAL
PH UA: 6 (ref 5–6)
PROTEIN UA: NEGATIVE
RBC UA: NORMAL /HPF (ref 0–4)
RENAL EPITHELIAL, UA: NORMAL /HPF
SPECIFIC GRAVITY UA: 1.01 (ref 1.01–1.02)
TRICHOMONAS: NORMAL
TURBIDITY: ABNORMAL
URINE HGB: ABNORMAL
UROBILINOGEN, URINE: NORMAL
WBC UA: NORMAL /HPF (ref 0–4)
YEAST: NORMAL

## 2020-06-10 PROCEDURE — 99213 OFFICE O/P EST LOW 20 MIN: CPT | Performed by: INTERNAL MEDICINE

## 2020-06-10 PROCEDURE — 81001 URINALYSIS AUTO W/SCOPE: CPT

## 2020-06-10 PROCEDURE — 99213 OFFICE O/P EST LOW 20 MIN: CPT

## 2020-06-10 RX ORDER — CIPROFLOXACIN 500 MG/1
500 TABLET, FILM COATED ORAL 2 TIMES DAILY
Qty: 14 TABLET | Refills: 0 | Status: SHIPPED | OUTPATIENT
Start: 2020-06-10 | End: 2020-06-17

## 2020-06-10 NOTE — TELEPHONE ENCOUNTER
Pt called complaining of UTI symptoms. I saw 1 same day restricted appt open for  [facility] today. I called office and spoke to Natacha who said send pt to Urgent Care as  [facility] said no more pt's today. Pt was instructed to go to Urgent Care today.

## 2020-06-11 NOTE — PROGRESS NOTES
EXAM  /74 (Site: Left Upper Arm, Position: Sitting, Cuff Size: Large Adult)   Pulse 76   Temp 96.6 °F (35.9 °C) (Infrared)   Resp 16   Ht 5' 6.61\" (1.692 m)   Wt 213 lb 6.5 oz (96.8 kg)   BMI 33.81 kg/m²   Constitutional: No acute distress. Sits in chair comfortably  Eyes: Sclerae nonicteric. No lid lag or proptosis  HENT: External ears normal. No external lesions on the nose  Neck: No gross masses. Trachea visibly midline  Respiratory: Good air entry bilaterally. No wheezing or crackles  Cardiovascular: Normal S1-S2. No murmurs. No lower extremity edema  Gastrointestinal: No visible masses. No visible hernias  Skin: No abnormal rashes. No abnormal masses  Neurologic: Cranial nerves grossly intact  Psychiatric: Normal affect. Alert and oriented        Labs Reviewed 6/10/2020:    Lab Results   Component Value Date    WBC 5.0 04/27/2020    HGB 12.5 04/27/2020    HCT 38.9 04/27/2020     04/27/2020    CHOL 170 04/13/2020    TRIG 179 (H) 04/13/2020    HDL 44 04/13/2020    ALT 12 04/27/2020    AST 21 04/27/2020     (H) 04/27/2020    K 3.7 04/27/2020     04/27/2020    CREATININE 0.80 04/27/2020    BUN 10 04/27/2020    CO2 27 04/27/2020    GLUF 105 (H) 10/15/2019    LABA1C 5.5 04/13/2020       Plan        UTI: UA not totally consistent with an infection, however given her symptoms, will treat with antibiotics for now. She does not want to get a CT scan done for kidney stone, however I advised that if her symptoms worsen, or if abdominal pain gets worse then she should call the office and we might consider getting a CAT scan. She agrees with the plan. Diagnosis Orders   1. Urinary tract infection without hematuria, site unspecified             Patient given educational materials - see patient instructions. Discussed use, benefit, and side effects of prescribed medications. All patient questions answered. Pt voiced understanding. Reviewed health maintenance. Electronically signed Chintan Toscano MD on 6/10/2020 at 11:32 AM      This note has been created using the Epic electronic health record, and dictated in part by [employer] dictation system. Despite the documenting physician's best efforts, there may be errors in spelling, grammar or syntax.

## 2020-06-29 RX ORDER — FENOFIBRATE 145 MG/1
TABLET, COATED ORAL
Qty: 90 TABLET | Refills: 0 | Status: SHIPPED | OUTPATIENT
Start: 2020-06-29 | End: 2020-09-25

## 2020-07-21 ENCOUNTER — HOSPITAL ENCOUNTER (OUTPATIENT)
Dept: BONE DENSITY | Age: 65
Discharge: HOME | End: 2020-07-23
Payer: MEDICARE

## 2020-07-21 ENCOUNTER — HOSPITAL ENCOUNTER (OUTPATIENT)
Dept: MAMMOGRAPHY | Age: 65
Discharge: HOME | End: 2020-07-23
Payer: MEDICARE

## 2020-07-21 PROCEDURE — 77063 BREAST TOMOSYNTHESIS BI: CPT

## 2020-07-21 PROCEDURE — 77085 DXA BONE DENSITY AXL VRT FX: CPT

## 2020-09-01 RX ORDER — ATORVASTATIN CALCIUM 20 MG/1
TABLET, FILM COATED ORAL
Qty: 90 TABLET | Refills: 2 | Status: SHIPPED | OUTPATIENT
Start: 2020-09-01 | End: 2021-04-27 | Stop reason: SDUPTHER

## 2020-09-25 RX ORDER — LETROZOLE 2.5 MG/1
TABLET, FILM COATED ORAL
Qty: 90 TABLET | Refills: 3 | Status: SHIPPED | OUTPATIENT
Start: 2020-09-25 | End: 2021-04-27 | Stop reason: ALTCHOICE

## 2020-09-26 RX ORDER — FENOFIBRATE 145 MG/1
TABLET, COATED ORAL
Qty: 90 TABLET | Refills: 1 | Status: SHIPPED | OUTPATIENT
Start: 2020-09-26 | End: 2021-03-25

## 2020-10-13 ENCOUNTER — HOSPITAL ENCOUNTER (OUTPATIENT)
Dept: LAB | Age: 65
Discharge: HOME | End: 2020-10-13
Payer: MEDICARE

## 2020-10-13 LAB — VITAMIN D 25-HYDROXY: 25.1 NG/ML (ref 30–100)

## 2020-10-13 PROCEDURE — 82306 VITAMIN D 25 HYDROXY: CPT

## 2020-10-13 PROCEDURE — 36415 COLL VENOUS BLD VENIPUNCTURE: CPT

## 2020-10-23 ENCOUNTER — OFFICE VISIT (OUTPATIENT)
Dept: FAMILY MEDICINE CLINIC | Age: 65
End: 2020-10-23
Payer: MEDICARE

## 2020-10-23 VITALS
BODY MASS INDEX: 34.42 KG/M2 | HEIGHT: 66 IN | HEART RATE: 61 BPM | DIASTOLIC BLOOD PRESSURE: 64 MMHG | SYSTOLIC BLOOD PRESSURE: 100 MMHG | WEIGHT: 214.2 LBS | TEMPERATURE: 97.9 F | OXYGEN SATURATION: 96 %

## 2020-10-23 PROCEDURE — 99214 OFFICE O/P EST MOD 30 MIN: CPT | Performed by: FAMILY MEDICINE

## 2020-10-23 PROCEDURE — 90670 PCV13 VACCINE IM: CPT | Performed by: FAMILY MEDICINE

## 2020-10-23 ASSESSMENT — ENCOUNTER SYMPTOMS
BLOOD IN STOOL: 1
COUGH: 0
SHORTNESS OF BREATH: 0

## 2020-10-23 NOTE — PROGRESS NOTES
[facility]  [address], [address] [place]  [phone]      Santana Dong is a 71 y.o. female who presents today for her medical conditions/complaints as noted below. Santana Dong is c/o of Hyperlipidemia (6m f/u ) and Discuss Labs (drawn-10/13/20)      HPI:     Pt here today for follow-up of HLD, Vit D deficiency. Reviewed recent lab from 10/13 - level Vit D level dropped slightly from 27.5 to 25.1.  Pt states she is currently taking a total daily amount of 6500 IU between MVI, Calcium + D  (4 daily), and Vit D3 5000 IU daily. Has started riding her stationary bike at home for 30 mins, 7 days/week. Also staying active at home with yardwork. Weight fairly stable - 214 lbs today. Pt states she has made no significant diet changes in the past year.        Taking Atorvastatin 20 mg nightly and Fenofibrate 145 mg nightly for HLD - stable. Denies side effects to these. Last LP on 4/13/20 was at goal, other than mildly elevated TG's at 179. BP well-controlled today 100/64.     Taking Paxil 40 mg daily - stable.  Still feels anxious/down some days, but relates it this year more to Covid. Feels better when she does exercise. Still taking Femara 2.5 mg daily per Dr. Ruy Flores for h/o R breast CA. This year will be her 5th year since cancer diagnosis. Will speak to Dr. Ruy Flores about when she can possibly stop this, as she does feel that it could be causing some abdominal side effects. Next appt with him is 11/2/20.     Pt is still having hot flashes daily, but relates a lot of them to anxiousness or when she exerts herself. Has more of the flashes at night, but during the day, she just seems to sweat so much more than previously.     Taking Zyrtec 10 mg daily year-round.       Taking OTC Omeprazole 20 mg daily for GERD - stable; denies recent heartburn.   Still taking OTC Nydia (vinegar supplement) with meals that also seems to be helping.      Seeing Dr. Anders Dejesus every 4 months for glaucoma; taking Timoptic twice daily in both eyes. Past Medical History:   Diagnosis Date    Cancer of right female breast ([facility] [address]     infiltrating ductal carcinoma T10 N0 M0 ER positive    Cataract of left eye     extraction 3/13/18    Cataract of right eye     extraction 2/15/18    Depression     GERD (gastroesophageal reflux disease)     Glaucoma     Dr Agnes Cannon History of gastroenteritis 1994    Hospitalized for gastroenteritis and dehydration.  Hyperlipidemia     Hypertension     Controlled with diet and exercise.  OA (osteoarthritis)     Seasonal allergies       Past Surgical History:   Procedure Laterality Date    BREAST SURGERY Right 12/14/2015    lumpectomy with sentinal node biopsy    BUNIONECTOMY      Remote.  COLONOSCOPY  1/28/2013    Grade 1 internal hemorrhoids. Otherwise normal.    DILATION AND CURETTAGE OF UTERUS  12/29/2005    Hysteroscopy.  HEMORRHOID SURGERY      rubber banding by Dr. Josephine Kramer 10/28/16    INTRACAPSULAR CATARACT EXTRACTION Right 02/15/2018    Dr Vera Wagner Left 03/13/2018    Dr Angelic Myers ARTHROSCOPY Right 7/26/2002    Partial medial meniscectomy.  MENISCECTOMY Right 7/2002    Medial.    MT SONO GUIDE NEEDLE BIOPSY Right 11/20/2015    Right breast    TOE OSTEOTOMY Right 2009    Metatarsal osteotomy of right 1st digit.     TOTAL KNEE ARTHROPLASTY Left 11/9/2011    TOTAL KNEE ARTHROPLASTY Right 12/3/2010    UPPER GASTROINTESTINAL ENDOSCOPY  2/2004     Family History   Problem Relation Age of Onset    Dementia Mother     Breast Cancer Sister      Social History     Tobacco Use    Smoking status: Never Smoker    Smokeless tobacco: Never Used   Substance Use Topics    Alcohol use: No     Alcohol/week: 0.0 standard drinks      Current Outpatient Medications   Medication Sig Dispense Refill    vitamin D (CHOLECALCIFEROL) 125 MCG (5000 UT) CAPS capsule Take 5,000 Units by mouth daily      Multiple Vitamin (MULTI-VITAMIN PO) Take 1 tablet by mouth daily      fenofibrate (TRICOR) 145 MG tablet TAKE 1 TABLET DAILY 90 tablet 1    letrozole (FEMARA) 2.5 MG tablet TAKE 1 TABLET DAILY 90 tablet 3    atorvastatin (LIPITOR) 20 MG tablet TAKE 1 TABLET DAILY 90 tablet 2    PARoxetine (PAXIL) 40 MG tablet TAKE 1 TABLET EVERY MORNING 90 tablet 3    calcium citrate-vitamin D (CITRACAL+D) 315-200 MG-UNIT per tablet Take 1 tablet by mouth      timolol (TIMOPTIC) 0.5 % ophthalmic solution       omeprazole (PRILOSEC) 20 MG capsule Take 1 capsule by mouth daily. 90 capsule 1    cetirizine (ZYRTEC) 10 MG tablet Take 10 mg by mouth daily. No current facility-administered medications for this visit. Allergies   Allergen Reactions    No Known Allergies      unknown       Health Maintenance   Topic Date Due    Annual Wellness Visit (AWV)  04/12/2020    HIV screen  04/19/2021 (Originally 12/6/1970)    Lipid screen  04/13/2021    Breast cancer screen  07/21/2021    Pneumococcal 65+ years Vaccine (2 of 2 - PPSV23) 10/23/2021    Colon cancer screen colonoscopy  01/28/2023    Diabetes screen  04/13/2023    Cervical cancer screen  10/21/2024    DTaP/Tdap/Td vaccine (2 - Td) 05/09/2027    DEXA (modify frequency per FRAX score)  Completed    Flu vaccine  Completed    Shingles Vaccine  Completed    Hepatitis C screen  Completed    Hepatitis A vaccine  Aged Out    Hepatitis B vaccine  Aged Out    Hib vaccine  Aged Out    Meningococcal (ACWY) vaccine  Aged Out       Subjective:      Review of Systems   Constitutional: Positive for diaphoresis. Negative for fever. Respiratory: Negative for cough and shortness of breath. Cardiovascular: Negative for chest pain and palpitations. Gastrointestinal: Positive for blood in stool (related to hemorrhoids - was only intermittent). Genitourinary: Negative for dysuria and hematuria.    Skin: Negative for rash and wound. Neurological: Negative for dizziness and light-headedness. Psychiatric/Behavioral: Positive for dysphoric mood (stable). Negative for suicidal ideas. Objective:     Vitals:    10/23/20 1014   BP: 100/64   Pulse: 61   Temp: 97.9 °F (36.6 °C)   TempSrc: Tympanic   SpO2: 96%   Weight: 214 lb 3.2 oz (97.2 kg)   Height: 5' 6\" (1.676 m)     Physical Exam  Vitals signs and nursing note reviewed. Constitutional:       General: She is not in acute distress. Appearance: She is well-developed. HENT:      Head: Normocephalic and atraumatic. Right Ear: Tympanic membrane, ear canal and external ear normal.      Left Ear: Tympanic membrane, ear canal and external ear normal.      Nose: Nose normal.      Mouth/Throat:      Mouth: Mucous membranes are moist.      Pharynx: Oropharynx is clear. No posterior oropharyngeal erythema. Eyes:      Conjunctiva/sclera: Conjunctivae normal.   Neck:      Musculoskeletal: Neck supple. Cardiovascular:      Rate and Rhythm: Normal rate and regular rhythm. Heart sounds: Normal heart sounds. Pulmonary:      Effort: Pulmonary effort is normal. No respiratory distress. Breath sounds: Normal breath sounds. Abdominal:      General: Bowel sounds are normal. There is no distension. Palpations: Abdomen is soft. Tenderness: There is no abdominal tenderness. Skin:     General: Skin is warm and dry. Neurological:      Mental Status: She is alert and oriented to person, place, and time. Assessment:       Diagnosis Orders   1. Vitamin D deficiency  Vitamin D 25 Hydroxy   2. Hyperlipidemia, unspecified hyperlipidemia type  Lipid Panel   3. Depression, unspecified depression type     4. Anxiety     5. Hot flashes     6. Gastroesophageal reflux disease, unspecified whether esophagitis present     7. Primary open angle glaucoma of both eyes, unspecified glaucoma stage     8.  Family history of thyroid disease  TSH With Reflex Ft4 Plan:     Pt will increase her Vit D3 to 6000 IU daily, and we will re-check level again in 6 months. Return in about 6 months (around 4/23/2021) for f/u HLD, Vit D. Orders Placed This Encounter   Procedures    PREVNAR 13 IM (Pneumococcal conjugate vaccine 13-valent)    Lipid Panel     Standing Status:   Future     Standing Expiration Date:   10/23/2021     Order Specific Question:   Is Patient Fasting?/# of Hours     Answer:   12 hour    Vitamin D 25 Hydroxy     Standing Status:   Future     Standing Expiration Date:   10/23/2021    TSH With Reflex Ft4     Standing Status:   Future     Standing Expiration Date:   10/23/2021     No orders of the defined types were placed in this encounter. Patient given educational materials - see patient instructions. Discussed use, benefit, and side effects of prescribed medications. All patient questions answered. Pt voiced understanding. Reviewed health maintenance.             Electronically signed by Daphne Pino DO on 11/1/2020 at 11:58 PM

## 2020-10-23 NOTE — PATIENT INSTRUCTIONS
Patient Education        Learning About Vitamin D  Why is it important to get enough vitamin D? Your body needs vitamin D to absorb calcium. Calcium keeps your bones and muscles, including your heart, healthy and strong. If your muscles don't get enough calcium, they can cramp, hurt, or feel weak. You may have long-term (chronic) muscle aches and pains. If you don't get enough vitamin D throughout life, you have an increased chance of having thin and brittle bones (osteoporosis) in your later years. Children who don't get enough vitamin D may not grow as much as others their age. They also have a chance of getting a rare disease called rickets. It causes weak bones. Vitamin D and calcium are added to many foods. And your body uses sunshine to make its own vitamin D. How much vitamin D do you need? The [place] [employer] recommends that people ages 3 through 79 get 600 IU (international units) every day. Adults 71 and older need 800 IU every day. Blood tests for vitamin D can check your vitamin D level. But there is no standard normal range used by all laboratories. You're likely getting enough vitamin D if your levels are in the range of 20 to 50 ng/mL. How can you get more vitamin D? Foods that contain vitamin D include:  · Walnut, tuna, and mackerel. These are some of the best foods to eat when you need to get more vitamin D.  · Cheese, egg yolks, and beef liver. These foods have vitamin D in small amounts. · Milk, soy drinks, orange juice, yogurt, margarine, and some kinds of cereal have vitamin D added to them. Some people don't make vitamin D as well as others. They may have to take extra care in getting enough vitamin D. Things that reduce how much vitamin D your body makes include:  · Dark skin, such as many  Americans have. · Age, especially if you are older than 72. · Digestive problems, such as Crohn's or celiac disease. · Liver and kidney disease.   Some people who do not get enough vitamin D may need supplements. Are there any risks from taking vitamin D?  · Too much vitamin D:  ? Can damage your kidneys. ? Can cause nausea and vomiting, constipation, and weakness. ? Raises the amount of calcium in your blood. If this happens, you can get confused or have an irregular heart rhythm. · Vitamin D may interact with other medicines. Tell your doctor about all of the medicines you take, including over-the-counter drugs, herbs, and pills. Tell your doctor about all of your current medical problems. Where can you learn more? Go to [email or web address].[email or web address] org and sign in to your [employer] account. Enter [id number] in the [facility] box to learn more about \"Learning About Vitamin D. \"     If you do not have an account, please click on the \"Sign Up Now\" link. Current as of: August 22, 2019               Content Version: 12.6  © 7498-0260 [employer]. Care instructions adapted under license by [facility] ([facility]). If you have questions about a medical condition or this instruction, always ask your healthcare professional. [place] [address] any warranty or liability for your use of this information.

## 2020-11-02 ENCOUNTER — OFFICE VISIT (OUTPATIENT)
Dept: ONCOLOGY | Age: 65
End: 2020-11-02
Payer: MEDICARE

## 2020-11-02 VITALS
OXYGEN SATURATION: 97 % | BODY MASS INDEX: 34.17 KG/M2 | SYSTOLIC BLOOD PRESSURE: 116 MMHG | DIASTOLIC BLOOD PRESSURE: 60 MMHG | WEIGHT: 212.6 LBS | HEART RATE: 75 BPM | TEMPERATURE: 96.9 F | HEIGHT: 66 IN

## 2020-11-02 PROCEDURE — 99214 OFFICE O/P EST MOD 30 MIN: CPT | Performed by: INTERNAL MEDICINE

## 2020-11-02 NOTE — PROGRESS NOTES
Patient ID: Lenka Islas, [birth year], [id number], 71 y.o. Diagnosis:   Right-sided breast cancer,T1cN0, status post lumpectomy with sentinel lymph node biopsy in December 2015  Adjuvant radiation therapy completed in February 2016  Started on Femara March 2016  HISTORY OF PRESENT ILLNESS:    Oncologic History: This is a 57-year-old female who was diagnosed with right-sided breast cancer in November 2015 and subsequently had lumpectomy with sentinel lymph node biopsy in December 2015. The pathology was positive for well-differentiated ductal carcinoma measuring 1.6 cm with low-grade ductal carcinoma in situ. There was also atypical lobular hyperplasia and lobular carcinoma in situ noted. Final surgical pathology showed P T1cp N0 disease, ER/ID positive and HER-2/александр negative. Patient completed radiation therapy in February 2016 and started on endocrine therapy with Femara in March 2016. Interval history:  Patient is returning for follow-up visit. She is tolerating Femara well. She has some hot flashes and would like to know whether she needs to take endocrine therapy for extended. .  I discussed the role of breast cancer index with patient. She had a mammogram and DEXA scan in July. She denied any new lumps or bumps in her breast.  No unintentional weight loss, new bone pain or back pain. During this visit patient's allergy, social, medical, surgical history and medications were reviewed and updated.   Allergies   Allergen Reactions    No Known Allergies      unknown       Current Outpatient Medications   Medication Sig Dispense Refill    vitamin D (CHOLECALCIFEROL) 125 MCG (5000 UT) CAPS capsule Take 5,000 Units by mouth daily      Multiple Vitamin (MULTI-VITAMIN PO) Take 1 tablet by mouth daily      fenofibrate (TRICOR) 145 MG tablet TAKE 1 TABLET DAILY 90 tablet 1    letrozole (FEMARA) 2.5 MG tablet TAKE 1 TABLET DAILY 90 tablet 3    atorvastatin (LIPITOR) 20 MG tablet TAKE 1 TABLET DAILY 90 tablet 2    PARoxetine (PAXIL) 40 MG tablet TAKE 1 TABLET EVERY MORNING 90 tablet 3    calcium citrate-vitamin D (CITRACAL+D) 315-200 MG-UNIT per tablet Take 1 tablet by mouth      timolol (TIMOPTIC) 0.5 % ophthalmic solution       omeprazole (PRILOSEC) 20 MG capsule Take 1 capsule by mouth daily. 90 capsule 1    cetirizine (ZYRTEC) 10 MG tablet Take 10 mg by mouth daily. No current facility-administered medications for this visit. REVIEW OF SYSTEM:   Constitutional: No fever or chills. No night sweats, no weight loss   Eyes: No eye discharge, double vision, or eye pain   HEENT: negative for sore mouth, sore throat, hoarseness and voice change   Respiratory: negative for cough , sputum, dyspnea, wheezing, hemoptysis, chest pain   Cardiovascular: negative for chest pain, dyspnea, palpitations, orthopnea, PND   Gastrointestinal: negative for nausea, vomiting, diarrhea, constipation, abdominal pain, Dysphagia, hematemesis and hematochezia   Genitourinary: negative for frequency, dysuria, nocturia, urinary incontinence, and hematuria   Integument: negative for rash, skin lesions, bruises.    Hematologic/Lymphatic: negative for easy bruising, bleeding, lymphadenopathy, petechiae and swelling/edema   Endocrine: negative for heat or cold intolerance, tremor, weight changes, change in bowel habits and hair loss   Musculoskeletal: negative for myalgias, arthralgias, pain, joint swelling,and bone pain   Neurological: negative for headaches, dizziness, seizures, weakness, numbness     OBJECTIVE:         Vitals:    11/02/20 1411   BP: 116/60   Pulse: 75   Temp: 96.9 °F (36.1 °C)   SpO2: 97%   PHYSICAL EXAM:   General appearance - well appearing, no in pain or distress   Mental status - alert and cooperative   Eyes - pupils equal and reactive, extraocular eye movements intact   Ears - bilateral TM's and external ear canals normal   Mouth - mucous membranes moist, pharynx normal without LOBULAR HYPERPLASIA AND LOBULAR CARCINOMA IN SITU. -  THE SURGICAL MARGINS ARE NEGATIVE FOR INVASIVE DUCTAL     CARCINOMA AND DCIS (THE CLOSEST MARGIN IS 0.5 CM FROM THE TUMOR). 2.  SENTINEL LYMPH NODE, EXCISIONAL BIOPSY:       -  NEGATIVE FOR MALIGNANCY (0/1). 3.  SENTINEL LYMPH NODE, EXCISIONAL BIOPSY:       -  NEGATIVE FOR MALIGNANCY (0/1). 4.  SENTINEL LYMPH NODE, EXCISIONAL BIOPSY:       -  NEGATIVE FOR MALIGNANCY (0/1). IMAGING DATA:    Bilateral screening mammogram 7/1/2019:  Impression   Benign findings.       BI-RADS 2       BIRADS:   BIRADS - CATEGORY 2       Benign, no evidence of malignancy.  Normal interval follow-up is recommended   in 12 months. Dexa sacn 6/28/18  Impression   1. Findings above consistent with osteopenia by WHO criteria. 2. Fracture risk is moderate.  Treatment is advised. ASSESSMENT:    This is a 57-year-old female with right-sided breast cancer status post lumpectomy followed by radiation therapy and currently on adjuvant endocrine therapy since March 2016. She is tolerating Femara very well except mild hot flashes which seems to be getting better. She has tried Effexor for about a month but then stopped due to side effects. She is using essential oils. She thinks her heart flashes are tolerable. At this point there is no evidence of recurrence. I discussed the risk-benefit and she will continue endocrine therapy for 5-10 years. I discussed the option of getting breast cancer index. No evidence of recurrence. PLAN:   I would order breast cancer index. Discussed with patient in determining the benefit from extended therapy  We will follow up on the results  She will get mammogram  in June/July 2021  RTc in July or earlier if needed    I spent more than 25 minutes examining, evaluating, reviewing data and counseling the patient.   Greater than 50% of that time was spent face-to-face with the patient in counseling and coordinating her

## 2020-12-04 ENCOUNTER — HOSPITAL ENCOUNTER (OUTPATIENT)
Age: 65
Setting detail: SPECIMEN
Discharge: HOME | End: 2020-12-04
Payer: MEDICARE

## 2020-12-14 ENCOUNTER — OFFICE VISIT (OUTPATIENT)
Dept: PRIMARY CARE CLINIC | Age: 65
End: 2020-12-14
Payer: MEDICARE

## 2020-12-14 ENCOUNTER — PATIENT MESSAGE (OUTPATIENT)
Dept: FAMILY MEDICINE CLINIC | Age: 65
End: 2020-12-14

## 2020-12-14 VITALS
TEMPERATURE: 98.4 F | SYSTOLIC BLOOD PRESSURE: 122 MMHG | HEART RATE: 78 BPM | BODY MASS INDEX: 34.99 KG/M2 | RESPIRATION RATE: 18 BRPM | WEIGHT: 216.8 LBS | OXYGEN SATURATION: 97 % | DIASTOLIC BLOOD PRESSURE: 80 MMHG

## 2020-12-14 PROCEDURE — 99211 OFF/OP EST MAY X REQ PHY/QHP: CPT | Performed by: FAMILY MEDICINE

## 2020-12-14 PROCEDURE — 11301 SHAVE SKIN LESION 0.6-1.0 CM: CPT | Performed by: FAMILY MEDICINE

## 2020-12-14 PROCEDURE — 99213 OFFICE O/P EST LOW 20 MIN: CPT | Performed by: FAMILY MEDICINE

## 2020-12-14 ASSESSMENT — ENCOUNTER SYMPTOMS
SHORTNESS OF BREATH: 0
WHEEZING: 0
COUGH: 0
COLOR CHANGE: 1
CHEST TIGHTNESS: 0

## 2020-12-14 NOTE — TELEPHONE ENCOUNTER
From: Miguelito Steel  To: Aline Laguerre DO  Sent: 12/14/2020 4:36 PM EST  Subject: Non-Urgent Medical Question    I am having some pain in my right upper arm and under the arm. I have a large skin tag and a few little ones in my arm pit and am wondering if they are causing the pain. I did some heavier lifting last week with a food give away and also thought that would be it, be the pain isn't getting better. Should I come in? Ibroprofen does help some. Also the large skin tag is sore to touch.

## 2020-12-15 LAB — SURGICAL PATHOLOGY REPORT: NORMAL

## 2020-12-15 NOTE — PROGRESS NOTES
[address] [place] [address]  Dept: [phone]  Dept Fax: [phone]  Loc: [phone]    Yue Martinez is a 76 y.o. female who presents today for her medical conditions/complaints as noted below. Yue Martinez is c/o of   Chief Complaint   Patient presents with    Lesion(s)     Lesion under right axillary there for years, tender to touch, pain and intermittent radiating pain with movement and turning purple began 12/14/2020 5:00. No drainage. HPI:     HPI Here today for pain in the axilla. She has an area under her axilla that has been there for several years but it suddenly became tender to touch over the past week and today she felt like it started turning purple so she wanted to get it checked out. She did a lot of stirring today with her right arm which seems to have irritated it. No numbness or tingling in her arm or axilla and no swelling in the area. No drainage from the area. No fevers. She had breast cancer on the right side and that is where they took out a few lymph nodes. No nipple drainage from the right side or skin changes. Past Medical History:   Diagnosis Date    Cancer of right female breast ([address]     infiltrating ductal carcinoma T10 N0 M0 ER positive    Cataract of left eye     extraction 3/13/18    Cataract of right eye     extraction 2/15/18    Depression     GERD (gastroesophageal reflux disease)     Glaucoma     Dr Kori Moscoso History of gastroenteritis 1994    Hospitalized for gastroenteritis and dehydration.  Hyperlipidemia     Hypertension     Controlled with diet and exercise.     OA (osteoarthritis)     Seasonal allergies           Social History     Tobacco Use    Smoking status: Never Smoker    Smokeless tobacco: Never Used   Substance Use Topics    Alcohol use: No     Alcohol/week: 0.0 standard drinks Current Outpatient Medications   Medication Sig Dispense Refill    vitamin D (CHOLECALCIFEROL) 125 MCG (5000 UT) CAPS capsule Take 5,000 Units by mouth daily      Multiple Vitamin (MULTI-VITAMIN PO) Take 1 tablet by mouth daily      fenofibrate (TRICOR) 145 MG tablet TAKE 1 TABLET DAILY 90 tablet 1    letrozole (FEMARA) 2.5 MG tablet TAKE 1 TABLET DAILY 90 tablet 3    atorvastatin (LIPITOR) 20 MG tablet TAKE 1 TABLET DAILY 90 tablet 2    PARoxetine (PAXIL) 40 MG tablet TAKE 1 TABLET EVERY MORNING 90 tablet 3    calcium citrate-vitamin D (CITRACAL+D) 315-200 MG-UNIT per tablet Take 1 tablet by mouth      timolol (TIMOPTIC) 0.5 % ophthalmic solution       omeprazole (PRILOSEC) 20 MG capsule Take 1 capsule by mouth daily. 90 capsule 1    cetirizine (ZYRTEC) 10 MG tablet Take 10 mg by mouth daily. No current facility-administered medications for this visit. Allergies   Allergen Reactions    No Known Allergies      unknown       Subjective:     Review of Systems   Constitutional: Negative for activity change, appetite change, chills, fatigue and fever. Respiratory: Negative for cough, chest tightness, shortness of breath and wheezing. Cardiovascular: Negative for chest pain, palpitations and leg swelling. Genitourinary:        No breast changes   Skin: Positive for color change. Neurological: Negative for dizziness, syncope, weakness, light-headedness and headaches. Objective:      Physical Exam  Vitals signs and nursing note reviewed. Constitutional:       General: She is not in acute distress. Appearance: She is well-developed. Eyes:      Conjunctiva/sclera: Conjunctivae normal.   Neck:      Musculoskeletal: Normal range of motion and neck supple. Thyroid: No thyromegaly. Cardiovascular:      Rate and Rhythm: Normal rate and regular rhythm. Heart sounds: Normal heart sounds. No murmur.    Pulmonary: Effort: Pulmonary effort is normal. No respiratory distress. Breath sounds: Normal breath sounds. No wheezing. Lymphadenopathy:      Cervical: No cervical adenopathy. Skin:     General: Skin is warm and dry. Findings: No erythema or rash. Comments: Large irritated skin tag in right axilla   Neurological:      Mental Status: She is alert and oriented to person, place, and time. /80   Pulse 78   Temp 98.4 °F (36.9 °C) (Tympanic)   Resp 18   Wt 216 lb 12.8 oz (98.3 kg)   SpO2 97%   BMI 34.99 kg/m²     Assessment:       Diagnosis Orders   1. Inflamed skin tag  71724 - [place] SHAV SKIN LES 6-10MM TRUNK,ARM,LEG   2. At high risk for falls               Plan:        Irritated skin tag: new; It was removed by shave biopsy. I cleaned the skin with alcohol and anesthetized with 1ml of lidocaine without epi. I then re cleaned the skin with betadine and shaved the lesion off with a 15mm blade scalpel. Hemostasis was achieved with sliver nitrate and pressure. The wound was covered with a bandage with bacitracin. she tolerated the procedure well. Return if symptoms worsen or fail to improve. Orders Placed This Encounter   Procedures    08595 - [place] SHAV SKIN LES 6-10MM TRUNK,ARM,LEG       Patientgiven educational materials - see patient instructions. Discussed use, benefit,and side effects of prescribed medications. All patient questions answered. Ptvoiced understanding. Reviewed health maintenance. Instructed to continue currentmedications, diet and exercise. Patient agreed with treatment plan. Follow up asdirected. Electronically signed by Carla Key MD on 12/14/2020 at 10:54 PM     On the basis of positive falls risk screening, assessment and plan is as follows: home safety tips provided.

## 2021-03-24 DIAGNOSIS — E78.5 HYPERLIPIDEMIA, UNSPECIFIED HYPERLIPIDEMIA TYPE: ICD-10-CM

## 2021-03-25 RX ORDER — FENOFIBRATE 145 MG/1
TABLET, COATED ORAL
Qty: 90 TABLET | Refills: 3 | Status: SHIPPED | OUTPATIENT
Start: 2021-03-25 | End: 2022-04-06

## 2021-03-25 NOTE — TELEPHONE ENCOUNTER
Ashley Wilde called requesting a refill of the below medication which has been pended for you:     Requested Prescriptions     Pending Prescriptions Disp Refills    fenofibrate (TRICOR) 145 MG tablet [Pharmacy Med Name: FENOFIBRATE TABS 145MG] 90 tablet 3     Sig: TAKE 1 TABLET DAILY       Last Appointment Date: 10/23/2020  Next Appointment Date: 4/27/2021    Allergies   Allergen Reactions    No Known Allergies      unknown     Last labs 10/23/2020

## 2021-04-14 ENCOUNTER — HOSPITAL ENCOUNTER (OUTPATIENT)
Dept: LAB | Age: 66
Discharge: HOME | End: 2021-04-14
Payer: MEDICARE

## 2021-04-14 DIAGNOSIS — E78.5 HYPERLIPIDEMIA, UNSPECIFIED HYPERLIPIDEMIA TYPE: ICD-10-CM

## 2021-04-14 DIAGNOSIS — Z83.49 FAMILY HISTORY OF THYROID DISEASE: ICD-10-CM

## 2021-04-14 DIAGNOSIS — E55.9 VITAMIN D DEFICIENCY: ICD-10-CM

## 2021-04-14 LAB — TSH SERPL DL<=0.05 MIU/L-ACNC: 3.08 MIU/L (ref 0.3–5)

## 2021-04-14 PROCEDURE — 36415 COLL VENOUS BLD VENIPUNCTURE: CPT

## 2021-04-14 PROCEDURE — 80061 LIPID PANEL: CPT

## 2021-04-14 PROCEDURE — 84443 ASSAY THYROID STIM HORMONE: CPT

## 2021-04-14 PROCEDURE — 82306 VITAMIN D 25 HYDROXY: CPT

## 2021-04-15 LAB
CHOLESTEROL/HDL RATIO: 3.4
CHOLESTEROL: 151 MG/DL
HDLC SERPL-MCNC: 44 MG/DL
LDL CHOLESTEROL: 76 MG/DL (ref 0–130)
TRIGL SERPL-MCNC: 155 MG/DL
VITAMIN D 25-HYDROXY: 36.3 NG/ML (ref 30–100)
VLDLC SERPL CALC-MCNC: ABNORMAL MG/DL (ref 1–30)

## 2021-04-27 ENCOUNTER — OFFICE VISIT (OUTPATIENT)
Dept: FAMILY MEDICINE CLINIC | Age: 66
End: 2021-04-27
Payer: MEDICARE

## 2021-04-27 VITALS
BODY MASS INDEX: 33.43 KG/M2 | OXYGEN SATURATION: 98 % | TEMPERATURE: 98.6 F | WEIGHT: 208 LBS | HEIGHT: 66 IN | HEART RATE: 72 BPM | SYSTOLIC BLOOD PRESSURE: 106 MMHG | DIASTOLIC BLOOD PRESSURE: 70 MMHG

## 2021-04-27 DIAGNOSIS — E55.9 VITAMIN D DEFICIENCY: ICD-10-CM

## 2021-04-27 DIAGNOSIS — M54.2 NECK PAIN ON RIGHT SIDE: ICD-10-CM

## 2021-04-27 DIAGNOSIS — F32.A DEPRESSION, UNSPECIFIED DEPRESSION TYPE: ICD-10-CM

## 2021-04-27 DIAGNOSIS — Z17.0 MALIGNANT NEOPLASM OF LOWER-OUTER QUADRANT OF RIGHT BREAST OF FEMALE, ESTROGEN RECEPTOR POSITIVE (HCC): ICD-10-CM

## 2021-04-27 DIAGNOSIS — M62.838 TRAPEZIUS MUSCLE SPASM: ICD-10-CM

## 2021-04-27 DIAGNOSIS — M77.8 LEFT ELBOW TENDONITIS: ICD-10-CM

## 2021-04-27 DIAGNOSIS — M53.3 PAIN OF RIGHT SACROILIAC JOINT: ICD-10-CM

## 2021-04-27 DIAGNOSIS — M25.551 POSTERIOR PAIN OF HIP, RIGHT: ICD-10-CM

## 2021-04-27 DIAGNOSIS — E78.5 HYPERLIPIDEMIA, UNSPECIFIED HYPERLIPIDEMIA TYPE: Primary | ICD-10-CM

## 2021-04-27 DIAGNOSIS — C50.511 MALIGNANT NEOPLASM OF LOWER-OUTER QUADRANT OF RIGHT BREAST OF FEMALE, ESTROGEN RECEPTOR POSITIVE (HCC): ICD-10-CM

## 2021-04-27 PROCEDURE — 99212 OFFICE O/P EST SF 10 MIN: CPT | Performed by: FAMILY MEDICINE

## 2021-04-27 PROCEDURE — 99214 OFFICE O/P EST MOD 30 MIN: CPT | Performed by: FAMILY MEDICINE

## 2021-04-27 RX ORDER — LATANOPROST 50 UG/ML
1 SOLUTION/ DROPS OPHTHALMIC NIGHTLY
COMMUNITY
Start: 2021-04-22 | End: 2021-07-21

## 2021-04-27 RX ORDER — ATORVASTATIN CALCIUM 20 MG/1
20 TABLET, FILM COATED ORAL DAILY
Qty: 90 TABLET | Refills: 3 | Status: SHIPPED | OUTPATIENT
Start: 2021-04-27 | End: 2022-05-02 | Stop reason: SDUPTHER

## 2021-04-27 RX ORDER — PAROXETINE HYDROCHLORIDE 40 MG/1
TABLET, FILM COATED ORAL
Qty: 90 TABLET | Refills: 3 | Status: SHIPPED | OUTPATIENT
Start: 2021-04-27 | End: 2022-05-02 | Stop reason: DRUGHIGH

## 2021-04-27 ASSESSMENT — ENCOUNTER SYMPTOMS
CONSTIPATION: 0
BLOOD IN STOOL: 0
DIARRHEA: 0
SHORTNESS OF BREATH: 0

## 2021-04-27 NOTE — PROGRESS NOTES
[address]  [address], [address]  [phone]      Beatrice Escalante is a 76 y.o. female who presents today for her medical conditions/complaints as noted below. Beatrice Escalante is c/o of [employer] (April 14), Elbow Pain (left one, comes & goes past 3-4 mos), and Joint Pain (right hip, shoulder, right side of neck (sleeps on right side))      HPI:     Pt here today for follow-up. Results reviewed with pt during OV today - Vit D level normal at 36.3; TSH normal; LP at goal, other than TG's, which are improved at 155. Has started riding her stationary bike at home for 30 mins, 3 days/week. Gina Early for her to do it every day anymore due to [employer] [facility] having ball games. Also staying active at home with yardwork. Weight down 6 lbs since her last OV.       Taking Atorvastatin 20 mg nightly and Fenofibrate 145 mg nightly for HLD - stable. Denies side effects to these.      BP well-controlled today - 106/70.     Taking Paxil 40 mg daily - stable.  Feels better when she is able to get out of the house and be more active. Taking Vit D3 5000 IU daily and 1000 IU nightly; also gets total of 2000 IU of Vit D3 between her MVI and Calcium supplement BID.       Stopped taking Femara 2.5 mg daily in 1/20201 after 5 years from her R breast CA diagnosis - feels her stomach is feeling better since she stopped it. Will still see Dr. Mayuri Lewis yearly.     Taking Zyrtec 10 mg daily year-round.       Taking OTC Omeprazole 20 mg daily for GERD - stable; denies recent heartburn.  Still taking OTC Nydia (vinegar supplement) with meals that also seems to be helping.       Seeing Dr. Taina Mccarty every 4 months for glaucoma; taking Timoptic twice daily in both eyes and Latanoprost 1 drop in both eyes nightly. Pt is still having hot flashes daily, but states they are maybe a little better. Gets sweaty easily when she is active; gets nigth sweats sometimes. Pt is having pain in her L elbow - intermittent. Worse when she leans on her elbow; located just distal and lateral to elbow joint. No pain with lifting or using her L arm. No visible swelling or color change. Has not tried anything for it. No injury or trauma. Pt seeing chiropractor once per month - has been treating her R hip, which has been \"out\". Feels pain in posterior hip. Also has pain over R shoulder and R neck - whole R side feels \"tight\". Gets massages once per month as well. Has some trouble with steps or carrying heavy things. Takes Ibuprofen as needed and heat. Past Medical History:   Diagnosis Date    Cancer of right female breast ([facility] [address]     infiltrating ductal carcinoma T10 N0 M0 ER positive    Cataract of left eye     extraction 3/13/18    Cataract of right eye     extraction 2/15/18    Depression     GERD (gastroesophageal reflux disease)     Glaucoma     Dr Reagan Castillo History of gastroenteritis 1994    Hospitalized for gastroenteritis and dehydration.  Hyperlipidemia     Hypertension     Controlled with diet and exercise.  OA (osteoarthritis)     Seasonal allergies       Past Surgical History:   Procedure Laterality Date    BREAST SURGERY Right 12/14/2015    lumpectomy with sentinal node biopsy    BUNIONECTOMY      Remote.  COLONOSCOPY  1/28/2013    Grade 1 internal hemorrhoids. Otherwise normal.    DILATION AND CURETTAGE OF UTERUS  12/29/2005    Hysteroscopy.  HEMORRHOID SURGERY      rubber banding by Dr. Beatrice Mcbride 10/28/16    INTRACAPSULAR CATARACT EXTRACTION Right 02/15/2018    Dr Madina Contreras Left 03/13/2018    Dr Jalil Cormier ARTHROSCOPY Right 7/26/2002    Partial medial meniscectomy.  MENISCECTOMY Right 7/2002    Medial.    IA SONO GUIDE NEEDLE BIOPSY Right 11/20/2015    Right breast    TOE OSTEOTOMY Right 2009    Metatarsal osteotomy of right 1st digit.     TOTAL KNEE ARTHROPLASTY Left 11/9/2011    TOTAL KNEE ARTHROPLASTY Right 12/3/2010    UPPER GASTROINTESTINAL ENDOSCOPY  2/2004     Family History   Problem Relation Age of Onset    Dementia Mother     Breast Cancer Sister      Social History     Tobacco Use    Smoking status: Never Smoker    Smokeless tobacco: Never Used   Substance Use Topics    Alcohol use: No     Alcohol/week: 0.0 standard drinks      Current Outpatient Medications   Medication Sig Dispense Refill    latanoprost (XALATAN) 0.005 % ophthalmic solution Apply 1 drop to eye nightly      atorvastatin (LIPITOR) 20 MG tablet Take 1 tablet by mouth daily 90 tablet 3    PARoxetine (PAXIL) 40 MG tablet TAKE 1 TABLET EVERY MORNING 90 tablet 3    fenofibrate (TRICOR) 145 MG tablet TAKE 1 TABLET DAILY 90 tablet 3    vitamin D (CHOLECALCIFEROL) 125 MCG (5000 UT) CAPS capsule Take 5,000 Units by mouth daily      Multiple Vitamin (MULTI-VITAMIN PO) Take 1 tablet by mouth daily      calcium citrate-vitamin D (CITRACAL+D) 315-200 MG-UNIT per tablet Take 1 tablet by mouth      timolol (TIMOPTIC) 0.5 % ophthalmic solution       omeprazole (PRILOSEC) 20 MG capsule Take 1 capsule by mouth daily. 90 capsule 1    cetirizine (ZYRTEC) 10 MG tablet Take 10 mg by mouth daily. No current facility-administered medications for this visit.       No Known Allergies    Health Maintenance   Topic Date Due    Annual Wellness Visit (AWV)  Never done    Breast cancer screen  07/21/2021    Pneumococcal 65+ years Vaccine (2 of 2 - PPSV23) 10/23/2021    Lipid screen  04/14/2022    Colon cancer screen colonoscopy  01/28/2023    Diabetes screen  04/13/2023    DTaP/Tdap/Td vaccine (2 - Td) 05/09/2027    DEXA (modify frequency per FRAX score)  Completed    Flu vaccine  Completed    Shingles Vaccine  Completed    COVID-19 Vaccine  Completed    Hepatitis C screen  Completed    Hepatitis A vaccine  Aged Out    Hepatitis B vaccine  Aged Out    Hib vaccine  Aged Out    Meningococcal (ACWY) vaccine Aged Out       Subjective:      Review of Systems   Constitutional: Positive for diaphoresis (hot flashes/night sweats). Negative for fever and unexpected weight change. Respiratory: Negative for shortness of breath. Cardiovascular: Negative for chest pain. Gastrointestinal: Negative for blood in stool, constipation and diarrhea. Genitourinary: Negative for dysuria and hematuria. Musculoskeletal: Positive for arthralgias (R hip, L elbow) and neck pain (R side). Negative for joint swelling. Neurological: Negative for dizziness. Psychiatric/Behavioral: Positive for dysphoric mood. Confusion: stable. Objective:     Vitals:    04/27/21 1034   BP: 106/70   Site: Right Upper Arm   Position: Sitting   Cuff Size: Large Adult   Pulse: 72   Temp: 98.6 °F (37 °C)   TempSrc: Tympanic   SpO2: 98%   Weight: 208 lb (94.3 kg)   Height: 5' 6\" (1.676 m)     Physical Exam  Vitals signs and nursing note reviewed. Constitutional:       General: She is not in acute distress. Appearance: She is well-developed. HENT:      Head: Normocephalic and atraumatic. Right Ear: Tympanic membrane, ear canal and external ear normal.      Left Ear: Tympanic membrane, ear canal and external ear normal.      Nose: Nose normal.      Mouth/Throat:      Mouth: Mucous membranes are moist.      Pharynx: Oropharynx is clear. No posterior oropharyngeal erythema. Eyes:      Conjunctiva/sclera: Conjunctivae normal.   Neck:      Musculoskeletal: Neck supple. Cardiovascular:      Rate and Rhythm: Normal rate and regular rhythm. Heart sounds: Normal heart sounds. Pulmonary:      Effort: Pulmonary effort is normal. No respiratory distress. Breath sounds: Normal breath sounds. Abdominal:      General: Bowel sounds are normal. There is no distension. Palpations: Abdomen is soft. Tenderness: There is no abdominal tenderness. Skin:     General: Skin is warm and dry.    Neurological:      Mental Status: She is

## 2021-07-22 ENCOUNTER — HOSPITAL ENCOUNTER (OUTPATIENT)
Dept: MAMMOGRAPHY | Age: 66
Discharge: HOME | End: 2021-07-24
Payer: MEDICARE

## 2021-07-22 VITALS — WEIGHT: 205 LBS | HEIGHT: 66 IN | BODY MASS INDEX: 32.95 KG/M2

## 2021-07-22 DIAGNOSIS — Z12.31 SCREENING MAMMOGRAM, ENCOUNTER FOR: ICD-10-CM

## 2021-07-22 PROCEDURE — 77067 SCR MAMMO BI INCL CAD: CPT

## 2021-08-09 ENCOUNTER — OFFICE VISIT (OUTPATIENT)
Dept: ONCOLOGY | Age: 66
End: 2021-08-09
Payer: MEDICARE

## 2021-08-09 VITALS
SYSTOLIC BLOOD PRESSURE: 124 MMHG | DIASTOLIC BLOOD PRESSURE: 72 MMHG | TEMPERATURE: 97.2 F | HEIGHT: 66 IN | WEIGHT: 206.4 LBS | HEART RATE: 76 BPM | OXYGEN SATURATION: 97 % | BODY MASS INDEX: 33.17 KG/M2

## 2021-08-09 DIAGNOSIS — Z12.31 SCREENING MAMMOGRAM, ENCOUNTER FOR: Primary | ICD-10-CM

## 2021-08-09 DIAGNOSIS — C50.511 MALIGNANT NEOPLASM OF LOWER-OUTER QUADRANT OF RIGHT FEMALE BREAST, UNSPECIFIED ESTROGEN RECEPTOR STATUS (HCC): ICD-10-CM

## 2021-08-09 DIAGNOSIS — Z79.811 LONG TERM CURRENT USE OF AROMATASE INHIBITOR: ICD-10-CM

## 2021-08-09 PROCEDURE — 99214 OFFICE O/P EST MOD 30 MIN: CPT | Performed by: INTERNAL MEDICINE

## 2021-08-09 RX ORDER — LATANOPROST 50 UG/ML
1 SOLUTION/ DROPS OPHTHALMIC NIGHTLY
COMMUNITY
Start: 2021-05-24

## 2021-08-09 NOTE — PROGRESS NOTES
Patient ID: Bertha Soares, 1955, [id number], 76 y.o. Diagnosis:   Right-sided breast cancer,T1cN0, status post lumpectomy with sentinel lymph node biopsy in December 2015  Adjuvant radiation therapy completed in February 2016  Started on Femara March 2016  STOpped in January 2021 as her BCI was low and no benefits from extended therapy noted  HISTORY OF PRESENT ILLNESS:    Oncologic History: This is a 79-year-old female who was diagnosed with right-sided breast cancer in November 2015 and subsequently had lumpectomy with sentinel lymph node biopsy in December 2015. The pathology was positive for well-differentiated ductal carcinoma measuring 1.6 cm with low-grade ductal carcinoma in situ. There was also atypical lobular hyperplasia and lobular carcinoma in situ noted. Final surgical pathology showed P T1cp N0 disease, ER/KY positive and HER-2/александр negative. Patient completed radiation therapy in February 2016 and started on endocrine therapy with Femara in March 2016. Interval history:  Patient is return for follow-up return to discuss further recommendations. She has stopped Femara in January. Her breast cancer index showed low distant risk of metastatic disease and no benefit from extended endocrine therapy. She had a recent mammogram and is here to discuss results. Her mammogram showed no evidence of recurrence. Clinically she is doing well and denies any new lump or mass in her breast.   During this visit patient's allergy, social, medical, surgical history and medications were reviewed and updated.   No Known Allergies    Current Outpatient Medications   Medication Sig Dispense Refill    Multiple Vitamins-Minerals (ICAPS AREDS 2 PO) Take 2 capsules by mouth daily      latanoprost (XALATAN) 0.005 % ophthalmic solution Place 1 drop into both eyes nightly      atorvastatin (LIPITOR) 20 MG tablet Take 1 tablet by mouth daily 90 tablet 3    PARoxetine (PAXIL) 40 MG tablet TAKE 1 TABLET EVERY MORNING 90 tablet 3    fenofibrate (TRICOR) 145 MG tablet TAKE 1 TABLET DAILY 90 tablet 3    vitamin D (CHOLECALCIFEROL) 125 MCG (5000 UT) CAPS capsule Take 5,000 Units by mouth daily      Multiple Vitamin (MULTI-VITAMIN PO) Take 1 tablet by mouth daily      calcium citrate-vitamin D (CITRACAL+D) 315-200 MG-UNIT per tablet Take 1 tablet by mouth      timolol (TIMOPTIC) 0.5 % ophthalmic solution       omeprazole (PRILOSEC) 20 MG capsule Take 1 capsule by mouth daily. 90 capsule 1    cetirizine (ZYRTEC) 10 MG tablet Take 10 mg by mouth daily. No current facility-administered medications for this visit. REVIEW OF SYSTEM:   Constitutional: No fever or chills. No night sweats, no weight loss   Eyes: No eye discharge, double vision, or eye pain   HEENT: negative for sore mouth, sore throat, hoarseness and voice change   Respiratory: negative for cough , sputum, dyspnea, wheezing, hemoptysis, chest pain   Cardiovascular: negative for chest pain, dyspnea, palpitations, orthopnea, PND   Gastrointestinal: negative for nausea, vomiting, diarrhea, constipation, abdominal pain, Dysphagia, hematemesis and hematochezia   Genitourinary: negative for frequency, dysuria, nocturia, urinary incontinence, and hematuria   Integument: negative for rash, skin lesions, bruises.    Hematologic/Lymphatic: negative for easy bruising, bleeding, lymphadenopathy, petechiae and swelling/edema   Endocrine: negative for heat or cold intolerance, tremor, weight changes, change in bowel habits and hair loss   Musculoskeletal: negative for myalgias, arthralgias, pain, joint swelling,and bone pain   Neurological: negative for headaches, dizziness, seizures, weakness, numbness     OBJECTIVE:         Vitals:    08/09/21 1308   BP: 124/72   Pulse: 76   Temp: 97.2 °F (36.2 °C)   SpO2: 97%   PHYSICAL EXAM:   General appearance - well appearing, no in pain or distress   Mental status - alert and cooperative   Eyes - pupils equal and reactive, extraocular eye movements intact   Ears - bilateral TM's and external ear canals normal   Mouth - mucous membranes moist, pharynx normal without lesions   Neck - supple, no significant adenopathy   Lymphatics - no palpable lymphadenopathy, no hepatosplenomegaly   Chest - clear to auscultation, no wheezes, rales or rhonchi, symmetric air entry   Heart - normal rate, regular rhythm, normal S1, S2, no murmurs, rubs, clicks or gallops   Abdomen - soft, nontender, nondistended, no masses or organomegaly   Neurological - alert, oriented, normal speech, no focal findings or movement disorder noted   Musculoskeletal - no joint tenderness, deformity or swelling   Extremities - peripheral pulses normal, no pedal edema, no clubbing or cyanosis   Skin - normal coloration and turgor, no rashes, no suspicious skin lesions noted ,    LABORATORY DATA:     Lab Results   Component Value Date    WBC 5.0 04/27/2020    HGB 12.5 04/27/2020    HCT 38.9 04/27/2020    MCV 87.4 04/27/2020     04/27/2020    LYMPHOPCT 35 04/27/2020    RBC 4.45 04/27/2020    MCH 28.1 04/27/2020    MCHC 32.1 04/27/2020    RDW 13.2 04/27/2020    MONOPCT 10 04/27/2020    BASOPCT 1 04/27/2020    NEUTROABS 2.45 04/27/2020    LYMPHSABS 1.75 04/27/2020    MONOSABS 0.48 04/27/2020    EOSABS 0.25 04/27/2020    BASOSABS 0.04 04/27/2020         Chemistry        Component Value Date/Time     (H) 04/27/2020 1406    K 3.7 04/27/2020 1406     04/27/2020 1406    CO2 27 04/27/2020 1406    BUN 10 04/27/2020 1406    CREATININE 0.80 04/27/2020 1406        Component Value Date/Time    CALCIUM 9.4 04/27/2020 1406    ALKPHOS 68 04/27/2020 1406    AST 21 04/27/2020 1406    ALT 12 04/27/2020 1406    BILITOT 0.24 (L) 04/27/2020 1406        PATHOLOGY DATA:   Collected: 12/14/2015   Received: 12/15/2015   Reported: 12/16/2015 14:25     -- Diagnosis --   1.  RIGHT BREAST, EXCISIONAL LUMPECTOMY WITH WIRE LOCALIZATION:       -  INVASIVE, WELL-DIFFERENTIATED DUCTAL CARCINOMA (1.6 CM).  SEE     COMMENT. -  FOCAL LOW GRADE DUCTAL CARCINOMA IN SITU IS ASSOCIATED WITH THE     INVASIVE DUCTAL CARCINOMA. -  ATYPICAL LOBULAR HYPERPLASIA AND LOBULAR CARCINOMA IN SITU. -  THE SURGICAL MARGINS ARE NEGATIVE FOR INVASIVE DUCTAL     CARCINOMA AND DCIS (THE CLOSEST MARGIN IS 0.5 CM FROM THE TUMOR). 2.  SENTINEL LYMPH NODE, EXCISIONAL BIOPSY:       -  NEGATIVE FOR MALIGNANCY (0/1). 3.  SENTINEL LYMPH NODE, EXCISIONAL BIOPSY:       -  NEGATIVE FOR MALIGNANCY (0/1). 4.  SENTINEL LYMPH NODE, EXCISIONAL BIOPSY:       -  NEGATIVE FOR MALIGNANCY (0/1). IMAGING DATA:    Bilateral screening mammogram 7/1/2019:  Impression   Benign findings.       BI-RADS 2       BIRADS:   BIRADS - CATEGORY 2       Benign, no evidence of malignancy.  Normal interval follow-up is recommended   in 12 months. Dexa sacn 6/28/18  Impression   1. Findings above consistent with osteopenia by WHO criteria. 2. Fracture risk is moderate.  Treatment is advised. Bilateral screening mammogram 7/22/2021  Impression   Benign findings.       BI-RADS 2       BIRADS:   BIRADS - CATEGORY 2       Benign, no evidence of malignancy.  Normal interval follow-up is recommended   in 12 months. ASSESSMENT:    This is a 76 y.o. female with right-sided breast cancer status post lumpectomy followed by radiation therapy and adjuvant endocrine therapy since March 2016. She has stopped Femara in January. Her breast cancer index showed low distant risk of metastatic disease and no benefit from extended endocrine therapy. No evidence of recurrence.  She will be followed as per the NCCN guidelines     PLAN:   Her breast cancer index showed very low risk of distant recurrence and no benefit from extended therapy so endocrine therapy was stopped   I discussed results of recent mammogram which showed no evidence of recurrence   Patient will be followed annually with screening mammogram   Will get DEXA scan with her next appointment  Return to clinic in 1 year or earlier if needed    I spent more than 25 minutes examining, evaluating, reviewing data and counseling the patient. Greater than 50% of that time was spent face-to-face with the patient in counseling and coordinating her care.     Michaela Palacio MD  Hematology/Oncology

## 2021-11-02 ENCOUNTER — OFFICE VISIT (OUTPATIENT)
Dept: FAMILY MEDICINE CLINIC | Age: 66
End: 2021-11-02
Payer: MEDICARE

## 2021-11-02 VITALS
TEMPERATURE: 97.5 F | OXYGEN SATURATION: 98 % | WEIGHT: 206.6 LBS | DIASTOLIC BLOOD PRESSURE: 76 MMHG | BODY MASS INDEX: 33.2 KG/M2 | HEIGHT: 66 IN | HEART RATE: 59 BPM | SYSTOLIC BLOOD PRESSURE: 122 MMHG

## 2021-11-02 DIAGNOSIS — F32.A DEPRESSION, UNSPECIFIED DEPRESSION TYPE: ICD-10-CM

## 2021-11-02 DIAGNOSIS — E78.5 HYPERLIPIDEMIA, UNSPECIFIED HYPERLIPIDEMIA TYPE: Primary | ICD-10-CM

## 2021-11-02 DIAGNOSIS — F41.9 ANXIETY: ICD-10-CM

## 2021-11-02 DIAGNOSIS — Z23 NEED FOR PNEUMOCOCCAL VACCINATION: ICD-10-CM

## 2021-11-02 DIAGNOSIS — Z00.00 ROUTINE GENERAL MEDICAL EXAMINATION AT A HEALTH CARE FACILITY: ICD-10-CM

## 2021-11-02 DIAGNOSIS — Z17.0 MALIGNANT NEOPLASM OF LOWER-OUTER QUADRANT OF RIGHT BREAST OF FEMALE, ESTROGEN RECEPTOR POSITIVE (HCC): ICD-10-CM

## 2021-11-02 DIAGNOSIS — E55.9 VITAMIN D DEFICIENCY: ICD-10-CM

## 2021-11-02 DIAGNOSIS — H40.1130 PRIMARY OPEN ANGLE GLAUCOMA OF BOTH EYES, UNSPECIFIED GLAUCOMA STAGE: ICD-10-CM

## 2021-11-02 DIAGNOSIS — C50.511 MALIGNANT NEOPLASM OF LOWER-OUTER QUADRANT OF RIGHT BREAST OF FEMALE, ESTROGEN RECEPTOR POSITIVE (HCC): ICD-10-CM

## 2021-11-02 PROCEDURE — 99214 OFFICE O/P EST MOD 30 MIN: CPT | Performed by: FAMILY MEDICINE

## 2021-11-02 PROCEDURE — PBSHW PNEUMOCOCCAL POLYSACCHARIDE VACCINE 23-VALENT =>2YO SQ/IM: Performed by: FAMILY MEDICINE

## 2021-11-02 PROCEDURE — G0009 ADMIN PNEUMOCOCCAL VACCINE: HCPCS | Performed by: FAMILY MEDICINE

## 2021-11-02 PROCEDURE — G0438 PPPS, INITIAL VISIT: HCPCS | Performed by: FAMILY MEDICINE

## 2021-11-02 PROCEDURE — 90732 PPSV23 VACC 2 YRS+ SUBQ/IM: CPT | Performed by: FAMILY MEDICINE

## 2021-11-02 ASSESSMENT — ENCOUNTER SYMPTOMS
DIARRHEA: 0
BLOOD IN STOOL: 1
CONSTIPATION: 0
ABDOMINAL PAIN: 0

## 2021-11-02 ASSESSMENT — PATIENT HEALTH QUESTIONNAIRE - PHQ9
SUM OF ALL RESPONSES TO PHQ9 QUESTIONS 1 & 2: 0
SUM OF ALL RESPONSES TO PHQ QUESTIONS 1-9: 0
1. LITTLE INTEREST OR PLEASURE IN DOING THINGS: 0
SUM OF ALL RESPONSES TO PHQ QUESTIONS 1-9: 0
2. FEELING DOWN, DEPRESSED OR HOPELESS: 0
SUM OF ALL RESPONSES TO PHQ QUESTIONS 1-9: 0

## 2021-11-02 ASSESSMENT — LIFESTYLE VARIABLES: HOW OFTEN DO YOU HAVE A DRINK CONTAINING ALCOHOL: 0

## 2021-11-02 NOTE — PROGRESS NOTES
Cataract of right eye     extraction 2/15/18    Depression     GERD (gastroesophageal reflux disease)     Glaucoma     Dr Bonnie Goldstein History of gastroenteritis 1994    Hospitalized for gastroenteritis and dehydration.  History of therapeutic radiation     Hyperlipidemia     Hypertension     Controlled with diet and exercise.  OA (osteoarthritis)     Seasonal allergies        Past Surgical History:   Procedure Laterality Date    BREAST BIOPSY Right     BREAST LUMPECTOMY Right     BREAST SURGERY Right 12/14/2015    lumpectomy with sentinal node biopsy    BUNIONECTOMY      Remote.  COLONOSCOPY  1/28/2013    Grade 1 internal hemorrhoids. Otherwise normal.    DILATION AND CURETTAGE OF UTERUS  12/29/2005    Hysteroscopy.  HEMORRHOID SURGERY      rubber banding by Dr. Skaggs Spray 10/28/16    INTRACAPSULAR CATARACT EXTRACTION Right 02/15/2018    Dr Marina Perez Left 03/13/2018    Dr Shirlee Aschoff ARTHROSCOPY Right 7/26/2002    Partial medial meniscectomy.  MENISCECTOMY Right 7/2002    Medial.    KY SONO GUIDE NEEDLE BIOPSY Right 11/20/2015    Right breast    TOE OSTEOTOMY Right 2009    Metatarsal osteotomy of right 1st digit.     TOTAL KNEE ARTHROPLASTY Left 11/9/2011    TOTAL KNEE ARTHROPLASTY Right 12/3/2010    UPPER GASTROINTESTINAL ENDOSCOPY  2/2004       Family History   Problem Relation Age of Onset    Dementia Mother     Breast Cancer Sister     Breast Cancer Sister     No Known Problems Sister     Cancer Sister         colon cancer    No Known Problems Sister     No Known Problems Maternal Aunt     No Known Problems Maternal Aunt     No Known Problems Maternal Aunt     No Known Problems Maternal Aunt     No Known Problems Maternal Aunt     No Known Problems Maternal Aunt        CareTeam (Including outside providers/suppliers regularly involved in providing care):   Patient Care Team:  Namrata Merrill DO as PCP - General (Family Medicine)  Ambreen Quiroz DO as PCP - [facility] Empaneled Provider    Wt Readings from Last 3 Encounters:   11/02/21 206 lb 9.6 oz (93.7 kg)   08/09/21 206 lb 6.4 oz (93.6 kg)   07/22/21 205 lb (93 kg)     Vitals:    11/02/21 1101   BP: 122/76   Site: Right Upper Arm   Position: Sitting   Cuff Size: Large Adult   Pulse: 59   Temp: 97.5 °F (36.4 °C)   TempSrc: Temporal   SpO2: 98%   Weight: 206 lb 9.6 oz (93.7 kg)   Height: 5' 6\" (1.676 m)     Body mass index is 33.35 kg/m². Based upon direct observation of the patient, evaluation of cognition reveals recent and remote memory intact. Patient's complete Health Risk Assessment and screening values have been reviewed and are found in Flowsheets. The following problems were reviewed today and where indicated follow up appointments were made and/or referrals ordered.     Positive Risk Factor Screenings with Interventions:           Health Habits/Nutrition:  Health Habits/Nutrition  Do you exercise for at least 20 minutes 2-3 times per week?: (!) No  Have you lost any weight without trying in the past 3 months?: No  Do you eat only one meal per day?: No  Have you seen the dentist within the past year?: Yes  Body mass index: (!) 33.34  Health Habits/Nutrition Interventions:  · Inadequate physical activity:  patient agrees to increase physical activity as follows: as tolerated, up to 4-5 days/week     Safety:  Safety  Do you have working smoke detectors?: Yes  Have all throw rugs been removed or fastened?: (!) No  Do you have non-slip mats or surfaces in all bathtubs/showers?: Yes  Do all of your stairways have a railing or banister?: (!) No  Are your doorways, halls and stairs free of clutter?: Yes  Safety Interventions:  · Home safety tips provided     Personalized Preventive Plan   Current Health Maintenance Status  Immunization History   Administered Date(s) Administered    COVID-19, Pfizer, PF, 30mcg/0.3mL 03/02/2021, 03/23/2021    Influenza A ([id number]) Vaccine PF IM 01/26/2010    Influenza Vaccine, unspecified formulation 09/19/2017, 08/31/2020    Influenza Virus Vaccine 09/19/2013, 10/15/2014, 09/21/2015, 09/12/2016, 09/03/2019, 08/31/2020    Influenza, Donnella Anuj, IM, (6 mo and older Fluzone, Flulaval, Fluarix and 3 yrs and older Afluria) 09/10/2018, 09/03/2019    Influenza, Quadv, IM, PF (6 mo and older Fluzone, Flulaval, Fluarix, and 3 yrs and older Afluria) 09/10/2018, 09/03/2019    Influenza, Quadv, adjuvanted, 65 yrs +, IM, PF (Fluad) 08/31/2020, 09/14/2021    Pneumococcal Conjugate 13-valent ([id number]) 10/23/2020    Pneumococcal Polysaccharide ([id number]) 11/12/2011    Tdap (Boostrix, Adacel) 05/09/2017    Zoster Live (Zostavax) 12/08/2014    Zoster Recombinant (Shingrix) 05/14/2018, 07/31/2018        Health Maintenance   Topic Date Due    Annual Wellness Visit (AWV)  Never done    COVID-19 Vaccine (3 - Pfizer booster) 09/23/2021    Pneumococcal 65+ years Vaccine (2 of 2 - PPSV23) 10/23/2021    Lipid screen  04/14/2022    Breast cancer screen  07/22/2022    Colon cancer screen colonoscopy  01/28/2023    Diabetes screen  04/13/2023    DTaP/Tdap/Td vaccine (2 - Td or Tdap) 05/09/2027    DEXA (modify frequency per FRAX score)  Completed    Flu vaccine  Completed    Shingles Vaccine  Completed    Hepatitis C screen  Completed    Hepatitis A vaccine  Aged Out    Hepatitis B vaccine  Aged Out    Hib vaccine  Aged Out    Meningococcal (ACWY) vaccine  Aged Out     Recommendations for [employer] Due: see orders and patient instructions/AVS.  . Recommended screening schedule for the next 5-10 years is provided to the patient in written form: see Patient Raman Mcguire was seen today for medicare awv and hyperlipidemia.     Diagnoses and all orders for this visit:    Routine general medical examination at a health care facility

## 2021-11-02 NOTE — PATIENT INSTRUCTIONS
Personalized Preventive Plan for Ryann Munoz - 11/2/2021  Medicare offers a range of preventive health benefits. Some of the tests and screenings are paid in full while other may be subject to a deductible, co-insurance, and/or copay. Some of these benefits include a comprehensive review of your medical history including lifestyle, illnesses that may run in your family, and various assessments and screenings as appropriate. After reviewing your medical record and screening and assessments performed today your provider may have ordered immunizations, labs, imaging, and/or referrals for you. A list of these orders (if applicable) as well as your Preventive Care list are included within your After Visit Summary for your review. Other Preventive Recommendations:    · A preventive eye exam performed by an eye specialist is recommended every 1-2 years to screen for glaucoma; cataracts, macular degeneration, and other eye disorders. · A preventive dental visit is recommended every 6 months. · Try to get at least 150 minutes of exercise per week or 10,000 steps per day on a pedometer . · Order or download the FREE \"Exercise & Physical Activity: Your Everyday Guide\" from [employer]. Call 3-125.951.4020 or search [employer] online. · You need 7923-4946 mg of calcium and 3303-7095 IU of vitamin D per day. It is possible to meet your calcium requirement with diet alone, but a vitamin D supplement is usually necessary to meet this goal.  · When exposed to the sun, use a sunscreen that protects against both UVA and UVB radiation with an SPF of 30 or greater. Reapply every 2 to 3 hours or after sweating, drying off with a towel, or swimming. · Always wear a seat belt when traveling in a car. Always wear a helmet when riding a bicycle or motorcycle.

## 2021-11-02 NOTE — PROGRESS NOTES
[facility]  [address], [address] [place]  [phone]      Alexsandra Kuo is a 76 y.o. female who presents today for her medical conditions/complaints as noted below. Alexsandra Kuo is c/o of Medicare AWV and Hyperlipidemia      HPI:     Pt here today for follow-up of HYPERLIPIDEMIA and for MAW visit (see other note). Has been doing Noom for 9 months - does think that it is helping her with weight loss. Has learned she is a stress eater; also finding she can better identify healthier meats and learning good portion control. Taking Atorvastatin 20 mg nightly and Fenofibrate 145 mg nightly for HLD - stable.  Due for re-check of LP in 6 months. Denies side effects to these.      BP well-controlled today - 122/76.     Taking Paxil 40 mg daily - stable.  Working well for her mood.       Taking Vit D3 5000 IU daily and 1000 IU nightly; also gets total of 2000 IU of Vit D3 between her MVI and Calcium supplement BID.       Stopped taking Femara 2.5 mg daily in 1/2021 after 5 years from her R breast CA diagnosis - feels her stomach is feeling better since she stopped it. Will still see Dr. Arnold  yearly; next OV in 8/2022.     Taking Zyrtec 10 mg daily year-round.       Taking OTC Omeprazole 20 mg daily for GERD - stable; denies recent heartburn.      Still intermittently taking OTC Nydia (apple cider vinegar supplement) with meals that also seems to be helping.       Seeing Dr. Asael Francois every 4 months for glaucoma; taking Timoptic twice daily in both eyes and Latanoprost 1 drop in both eyes nightly. Also started eye vitamin (2 capsules) once daily. Pt still seeing chiropractor once per month for R hip and R shoulder/neck. Also using heating pad and CBD oil. Gets massages once per month as well. Takes Ibuprofen as needed when pain worsens.       Past Medical History:   Diagnosis Date    Breast cancer ([facility] [address]     Cancer of right female breast Never Smoker    Smokeless tobacco: Never Used   Substance Use Topics    Alcohol use: No     Alcohol/week: 0.0 standard drinks      Current Outpatient Medications   Medication Sig Dispense Refill    Multiple Vitamins-Minerals (ICAPS AREDS 2 PO) Take 2 capsules by mouth daily      latanoprost (XALATAN) 0.005 % ophthalmic solution Place 1 drop into both eyes nightly      atorvastatin (LIPITOR) 20 MG tablet Take 1 tablet by mouth daily 90 tablet 3    PARoxetine (PAXIL) 40 MG tablet TAKE 1 TABLET EVERY MORNING 90 tablet 3    fenofibrate (TRICOR) 145 MG tablet TAKE 1 TABLET DAILY 90 tablet 3    vitamin D (CHOLECALCIFEROL) 125 MCG (5000 UT) CAPS capsule Take 5,000 Units by mouth daily      Multiple Vitamin (MULTI-VITAMIN PO) Take 1 tablet by mouth daily      calcium citrate-vitamin D (CITRACAL+D) 315-200 MG-UNIT per tablet Take 1 tablet by mouth      timolol (TIMOPTIC) 0.5 % ophthalmic solution       omeprazole (PRILOSEC) 20 MG capsule Take 1 capsule by mouth daily. 90 capsule 1    cetirizine (ZYRTEC) 10 MG tablet Take 10 mg by mouth daily.  azithromycin (ZITHROMAX) 250 MG tablet Take 1 tablet by mouth See Admin Instructions for 5 days 500mg on day 1 followed by 250mg on days 2 - 5 6 tablet 0    benzonatate (TESSALON) 200 MG capsule Take 1 capsule by mouth 3 times daily as needed for Cough 30 capsule 0     No current facility-administered medications for this visit.      No Known Allergies    Health Maintenance   Topic Date Due    Annual Wellness Visit (AWV)  Never done    Lipid screen  04/14/2022    Breast cancer screen  07/22/2022    Colon cancer screen colonoscopy  01/28/2023    Diabetes screen  04/13/2023    DTaP/Tdap/Td vaccine (2 - Td or Tdap) 05/09/2027    DEXA (modify frequency per FRAX score)  Completed    Flu vaccine  Completed    Shingles Vaccine  Completed    Pneumococcal 65+ years Vaccine  Completed    COVID-19 Vaccine  Completed    Hepatitis C screen  Completed    Hepatitis A vaccine  Aged Out    Hepatitis B vaccine  Aged Out    Hib vaccine  Aged Out    Meningococcal (ACWY) vaccine  Aged Out       Subjective:      Review of Systems   Constitutional: Unexpected weight change: has lost 2 lbs since her last OV; had lost 4-5 more than that, but with her sister's recent death, she gained some back    Cardiovascular: Negative for chest pain and palpitations. Gastrointestinal: Positive for blood in stool (intermittent, with her hemorrhoids; worse after she eats too many tomatoes). Negative for abdominal pain, constipation and diarrhea. Genitourinary: Negative for dysuria and hematuria. Musculoskeletal: Positive for arthralgias (R heel - intermittent). Objective:     Vitals:    11/02/21 1101   BP: 122/76   Site: Right Upper Arm   Position: Sitting   Cuff Size: Large Adult   Pulse: 59   Temp: 97.5 °F (36.4 °C)   TempSrc: Temporal   SpO2: 98%   Weight: 206 lb 9.6 oz (93.7 kg)   Height: 5' 6\" (1.676 m)     Physical Exam  Vitals and nursing note reviewed. Constitutional:       General: She is not in acute distress. Appearance: She is well-developed. HENT:      Head: Normocephalic and atraumatic. Right Ear: Tympanic membrane, ear canal and external ear normal.      Left Ear: Tympanic membrane, ear canal and external ear normal.      Nose: Nose normal.      Mouth/Throat:      Mouth: Mucous membranes are moist.      Pharynx: Oropharynx is clear. No posterior oropharyngeal erythema. Eyes:      Conjunctiva/sclera: Conjunctivae normal.   Cardiovascular:      Rate and Rhythm: Normal rate and regular rhythm. Heart sounds: Normal heart sounds. Pulmonary:      Effort: Pulmonary effort is normal. No respiratory distress. Breath sounds: Normal breath sounds. Abdominal:      General: Bowel sounds are normal. There is no distension. Palpations: Abdomen is soft. Tenderness: There is no abdominal tenderness. Musculoskeletal:      Cervical back: Neck supple. Skin:     General: Skin is warm and dry. Neurological:      Mental Status: She is alert and oriented to person, place, and time. Assessment:      1. Hyperlipidemia, unspecified hyperlipidemia type  -     Comprehensive Metabolic Panel; Future  2. Depression, unspecified depression type  3. Anxiety  4. Vitamin D deficiency  5. Malignant neoplasm of lower-outer quadrant of right breast of female, estrogen receptor positive ([address]  6. Primary open angle glaucoma of both eyes, unspecified glaucoma stage  7. Routine general medical examination at a health care facility  8. Need for pneumococcal vaccination  -     PNEUMOVAX 23 subcutaneous/IM (Pneumococcal polysaccharide vaccine 23-valent >= 3yo)         Plan:      Return in 6 months (on 5/2/2022) for f/u HLD, labs. Orders Placed This Encounter   Procedures    PNEUMOVAX 23 subcutaneous/IM (Pneumococcal polysaccharide vaccine 23-valent >= 3yo)    Comprehensive Metabolic Panel     Standing Status:   Future     Standing Expiration Date:   11/2/2022     No orders of the defined types were placed in this encounter. Patient given educational materials - see patient instructions. Discussed use, benefit, and side effects of prescribed medications. All patient questions answered. Pt voiced understanding. Reviewed health maintenance.             Electronically signed by Berny Mares DO, DO on 11/14/2021 at 11:39 PM

## 2021-11-11 ENCOUNTER — VIRTUAL VISIT (OUTPATIENT)
Dept: FAMILY MEDICINE CLINIC | Age: 66
End: 2021-11-11
Payer: MEDICARE

## 2021-11-11 DIAGNOSIS — J40 BRONCHITIS: Primary | ICD-10-CM

## 2021-11-11 DIAGNOSIS — J06.9 ACUTE UPPER RESPIRATORY INFECTION: ICD-10-CM

## 2021-11-11 PROCEDURE — 99213 OFFICE O/P EST LOW 20 MIN: CPT | Performed by: FAMILY MEDICINE

## 2021-11-11 PROCEDURE — 99212 OFFICE O/P EST SF 10 MIN: CPT | Performed by: FAMILY MEDICINE

## 2021-11-11 RX ORDER — AZITHROMYCIN 250 MG/1
250 TABLET, FILM COATED ORAL SEE ADMIN INSTRUCTIONS
Qty: 6 TABLET | Refills: 0 | Status: SHIPPED | OUTPATIENT
Start: 2021-11-11 | End: 2021-11-16

## 2021-11-11 RX ORDER — BENZONATATE 200 MG/1
200 CAPSULE ORAL 3 TIMES DAILY PRN
Qty: 30 CAPSULE | Refills: 0 | Status: SHIPPED | OUTPATIENT
Start: 2021-11-11 | End: 2021-11-18

## 2021-11-11 ASSESSMENT — ENCOUNTER SYMPTOMS
SORE THROAT: 1
SINUS PRESSURE: 1
WHEEZING: 0
RHINORRHEA: 1
SHORTNESS OF BREATH: 0
EYES NEGATIVE: 1
COUGH: 1
ALLERGIC/IMMUNOLOGIC NEGATIVE: 1
GASTROINTESTINAL NEGATIVE: 1

## 2021-11-11 NOTE — PROGRESS NOTES
Subjective:      Patient ID: Payal Carranza is a 76 y.o. female. HPI acute visit for cough and sore throat. Video teleconference visit scheduled today during covid 19 restrictions. Patient consented to use personal cell phone to connect the visit at home, alone. I am in the office using [employer] software to connect. Symptoms started Sunday, today Thursday. Chills and fever at the start along with cough. Sore throat started after cough. Fever low grade , up to 99. Some fatigue. Chills resolved. Cough more problematic with productive yellow phlegm , hoarse voice, no wheezing, some frontal sinus pressure.  had same. He went to , but no testing for covid. No loss of taste or smell to report. Nate Edwards has had covid vaccination and had her booster dose last week. Past Medical History:   Diagnosis Date    [facility] ([facility] [address]     Cancer of right female breast ([facility] [address]     infiltrating ductal carcinoma T10 N0 M0 ER positive    Cataract of left eye     extraction 3/13/18    Cataract of right eye     extraction 2/15/18    Depression     GERD (gastroesophageal reflux disease)     Glaucoma     Dr Camryn Patel History of gastroenteritis 1994    Hospitalized for gastroenteritis and dehydration.  History of therapeutic radiation     Hyperlipidemia     Hypertension     Controlled with diet and exercise.  OA (osteoarthritis)     Seasonal allergies      Past Surgical History:   Procedure Laterality Date    BREAST BIOPSY Right     BREAST LUMPECTOMY Right     BREAST SURGERY Right 12/14/2015    lumpectomy with sentinal node biopsy    BUNIONECTOMY      Remote.  COLONOSCOPY  1/28/2013    Grade 1 internal hemorrhoids. Otherwise normal.    DILATION AND CURETTAGE OF UTERUS  12/29/2005    Hysteroscopy.     HEMORRHOID SURGERY      rubber banding by Dr. Sejal Rajput 10/28/16    INTRACAPSULAR CATARACT EXTRACTION Right 02/15/2018    Dr Elda Sewell 03/13/2018    Dr Leonardo Benítez ARTHROSCOPY Right 7/26/2002    Partial medial meniscectomy.  MENISCECTOMY Right 7/2002    Medial.    [place] SONO GUIDE NEEDLE BIOPSY Right 11/20/2015    Right breast    TOE OSTEOTOMY Right 2009    Metatarsal osteotomy of right 1st digit.  TOTAL KNEE ARTHROPLASTY Left 11/9/2011    TOTAL KNEE ARTHROPLASTY Right 12/3/2010    UPPER GASTROINTESTINAL ENDOSCOPY  2/2004     Current Outpatient Medications   Medication Sig Dispense Refill    Multiple Vitamins-Minerals (ICAPS AREDS 2 PO) Take 2 capsules by mouth daily      latanoprost (XALATAN) 0.005 % ophthalmic solution Place 1 drop into both eyes nightly      atorvastatin (LIPITOR) 20 MG tablet Take 1 tablet by mouth daily 90 tablet 3    fenofibrate (TRICOR) 145 MG tablet TAKE 1 TABLET DAILY 90 tablet 3    Multiple Vitamin (MULTI-VITAMIN PO) Take 1 tablet by mouth daily      calcium citrate-vitamin D (CITRACAL+D) 315-200 MG-UNIT per tablet Take 1 tablet by mouth      omeprazole (PRILOSEC) 20 MG capsule Take 1 capsule by mouth daily. 90 capsule 1    cetirizine (ZYRTEC) 10 MG tablet Take 10 mg by mouth daily.  PARoxetine (PAXIL) 40 MG tablet TAKE 1 TABLET EVERY MORNING 90 tablet 3    vitamin D (CHOLECALCIFEROL) 125 MCG (5000 UT) CAPS capsule Take 5,000 Units by mouth daily      timolol (TIMOPTIC) 0.5 % ophthalmic solution        No current facility-administered medications for this visit. No Known Allergies        Review of Systems   Constitutional: Positive for chills and fatigue. Negative for fever. HENT: Positive for rhinorrhea, sinus pressure and sore throat. Eyes: Negative. Respiratory: Positive for cough. Negative for shortness of breath and wheezing. Cardiovascular: Negative. Negative for chest pain. Gastrointestinal: Negative. Endocrine: Negative. Genitourinary: Negative. Musculoskeletal: Negative. Skin: Negative. Allergic/Immunologic: Negative. Neurological: Negative. Hematological: Negative. Psychiatric/Behavioral: Negative. Objective:   Physical Exam  Constitutional:       General: She is not in acute distress. Appearance: Normal appearance. She is not toxic-appearing. HENT:      Head: Normocephalic and atraumatic. Pulmonary:      Effort: Pulmonary effort is normal. No respiratory distress. Neurological:      Mental Status: She is alert. Mental status is at baseline. Psychiatric:         Mood and Affect: Mood normal.         Behavior: Behavior normal.         Thought Content: Thought content normal.         Judgment: Judgment normal.     voice hoarse    Patient-Reported Vitals 11/11/2021   Patient-Reported Weight 203lbs   Patient-Reported Height 5ft 6in   Patient-Reported Temperature 98.4        Assessment:      Acute uri  Do not suspect covid by symptoms reported  Some tracheitis /bronchitis symptoms and frontal sinus pressure      Plan:      Discussed typical course, supportive care, and complications  Plan zpak to cover bronchitis /sinusitis complications. Tessalon and humidity for cough suppression  Call with concerns for complications.           Leonor Francisco MD

## 2021-11-22 ENCOUNTER — TELEPHONE (OUTPATIENT)
Dept: FAMILY MEDICINE CLINIC | Age: 66
End: 2021-11-22

## 2021-11-22 RX ORDER — PREDNISONE 20 MG/1
40 TABLET ORAL DAILY
Qty: 6 TABLET | Refills: 0 | Status: SHIPPED | OUTPATIENT
Start: 2021-11-22 | End: 2021-11-25

## 2021-11-22 NOTE — TELEPHONE ENCOUNTER
Rec. Voice rest, will call in 3 days of prednisone to help with residual inflammation.   [employer]

## 2022-04-01 DIAGNOSIS — E78.5 HYPERLIPIDEMIA, UNSPECIFIED HYPERLIPIDEMIA TYPE: ICD-10-CM

## 2022-04-04 NOTE — TELEPHONE ENCOUNTER
Dorothy Chappell called requesting a refill of the below medication which has been pended for you:     Requested Prescriptions     Pending Prescriptions Disp Refills    fenofibrate (TRICOR) 145 MG tablet [Pharmacy Med Name: FENOFIBRATE TABS 145MG] 90 tablet 3     Sig: TAKE 1 TABLET DAILY       Last Appointment Date: 11/2/2021  Next Appointment Date: 5/2/2022    No Known Allergies

## 2022-04-06 RX ORDER — FENOFIBRATE 145 MG/1
145 TABLET, COATED ORAL DAILY
Qty: 90 TABLET | Refills: 0 | Status: SHIPPED | OUTPATIENT
Start: 2022-04-06 | End: 2022-07-15

## 2022-04-26 ENCOUNTER — HOSPITAL ENCOUNTER (OUTPATIENT)
Dept: LAB | Age: 67
Discharge: HOME | End: 2022-04-26
Payer: MEDICARE

## 2022-04-26 DIAGNOSIS — E55.9 VITAMIN D DEFICIENCY: ICD-10-CM

## 2022-04-26 DIAGNOSIS — E78.5 HYPERLIPIDEMIA, UNSPECIFIED HYPERLIPIDEMIA TYPE: ICD-10-CM

## 2022-04-26 LAB
ALBUMIN SERPL-MCNC: 4.3 G/DL (ref 3.5–5.2)
ALBUMIN/GLOBULIN RATIO: 1.7 (ref 1–2.5)
ALP BLD-CCNC: 74 U/L (ref 35–104)
ALT SERPL-CCNC: 16 U/L (ref 5–33)
ANION GAP SERPL CALCULATED.3IONS-SCNC: 8 MMOL/L (ref 9–17)
AST SERPL-CCNC: 20 U/L
BILIRUB SERPL-MCNC: 0.26 MG/DL (ref 0.3–1.2)
BUN BLDV-MCNC: 9 MG/DL (ref 8–23)
BUN/CREAT BLD: 14 (ref 9–20)
CALCIUM SERPL-MCNC: 9.3 MG/DL (ref 8.6–10.4)
CHLORIDE BLD-SCNC: 108 MMOL/L (ref 98–107)
CHOLESTEROL/HDL RATIO: 3.7
CHOLESTEROL: 164 MG/DL
CO2: 28 MMOL/L (ref 20–31)
CREAT SERPL-MCNC: 0.64 MG/DL (ref 0.5–0.9)
GFR AFRICAN AMERICAN: >60 ML/MIN
GFR NON-AFRICAN AMERICAN: >60 ML/MIN
GFR SERPL CREATININE-BSD FRML MDRD: ABNORMAL ML/MIN/{1.73_M2}
GLUCOSE BLD-MCNC: 96 MG/DL (ref 70–99)
HDLC SERPL-MCNC: 44 MG/DL
LDL CHOLESTEROL: 87 MG/DL (ref 0–130)
POTASSIUM SERPL-SCNC: 4 MMOL/L (ref 3.7–5.3)
SODIUM BLD-SCNC: 144 MMOL/L (ref 135–144)
TOTAL PROTEIN: 6.9 G/DL (ref 6.4–8.3)
TRIGL SERPL-MCNC: 163 MG/DL
VITAMIN D 25-HYDROXY: 32.7 NG/ML

## 2022-04-26 PROCEDURE — 80061 LIPID PANEL: CPT

## 2022-04-26 PROCEDURE — 82306 VITAMIN D 25 HYDROXY: CPT

## 2022-04-26 PROCEDURE — 36415 COLL VENOUS BLD VENIPUNCTURE: CPT

## 2022-04-26 PROCEDURE — 80053 COMPREHEN METABOLIC PANEL: CPT

## 2022-05-02 ENCOUNTER — OFFICE VISIT (OUTPATIENT)
Dept: FAMILY MEDICINE CLINIC | Age: 67
End: 2022-05-02
Payer: MEDICARE

## 2022-05-02 VITALS
BODY MASS INDEX: 34.3 KG/M2 | DIASTOLIC BLOOD PRESSURE: 70 MMHG | HEART RATE: 61 BPM | TEMPERATURE: 97.3 F | SYSTOLIC BLOOD PRESSURE: 114 MMHG | WEIGHT: 213.4 LBS | HEIGHT: 66 IN | OXYGEN SATURATION: 97 %

## 2022-05-02 DIAGNOSIS — E78.5 HYPERLIPIDEMIA, UNSPECIFIED HYPERLIPIDEMIA TYPE: Primary | ICD-10-CM

## 2022-05-02 DIAGNOSIS — R22.42 LOCALIZED SWELLING OF TOE OF LEFT FOOT: ICD-10-CM

## 2022-05-02 DIAGNOSIS — E55.9 VITAMIN D DEFICIENCY: ICD-10-CM

## 2022-05-02 DIAGNOSIS — F32.A DEPRESSION, UNSPECIFIED DEPRESSION TYPE: ICD-10-CM

## 2022-05-02 DIAGNOSIS — C50.511 MALIGNANT NEOPLASM OF LOWER-OUTER QUADRANT OF RIGHT BREAST OF FEMALE, ESTROGEN RECEPTOR POSITIVE (HCC): ICD-10-CM

## 2022-05-02 DIAGNOSIS — Z17.0 MALIGNANT NEOPLASM OF LOWER-OUTER QUADRANT OF RIGHT BREAST OF FEMALE, ESTROGEN RECEPTOR POSITIVE (HCC): ICD-10-CM

## 2022-05-02 PROBLEM — H26.493 PCO (POSTERIOR CAPSULAR OPACIFICATION), BILATERAL: Status: ACTIVE | Noted: 2022-02-02

## 2022-05-02 PROBLEM — D31.41 NEVUS OF IRISES OF BOTH EYES: Status: ACTIVE | Noted: 2022-02-02

## 2022-05-02 PROBLEM — D31.42 NEVUS OF IRISES OF BOTH EYES: Status: ACTIVE | Noted: 2022-02-02

## 2022-05-02 PROCEDURE — 99214 OFFICE O/P EST MOD 30 MIN: CPT | Performed by: FAMILY MEDICINE

## 2022-05-02 PROCEDURE — 99212 OFFICE O/P EST SF 10 MIN: CPT | Performed by: FAMILY MEDICINE

## 2022-05-02 PROCEDURE — 1123F ACP DISCUSS/DSCN MKR DOCD: CPT | Performed by: FAMILY MEDICINE

## 2022-05-02 RX ORDER — PAROXETINE HYDROCHLORIDE 40 MG/1
TABLET, FILM COATED ORAL
Qty: 90 TABLET | Refills: 3 | Status: CANCELLED | OUTPATIENT
Start: 2022-05-02

## 2022-05-02 RX ORDER — PAROXETINE HYDROCHLORIDE 20 MG/1
20 TABLET, FILM COATED ORAL DAILY
Qty: 90 TABLET | Refills: 3 | Status: SHIPPED | OUTPATIENT
Start: 2022-05-02

## 2022-05-02 RX ORDER — ATORVASTATIN CALCIUM 20 MG/1
20 TABLET, FILM COATED ORAL DAILY
Qty: 90 TABLET | Refills: 3 | Status: SHIPPED | OUTPATIENT
Start: 2022-05-02

## 2022-05-02 NOTE — PROGRESS NOTES
[facility]  [address], [address] [place]  [phone]      Lenka Islas is a 78 y.o. female who presents today for her medical conditions/complaints as noted below. Lenka Islas is c/o of Hyperlipidemia, Discuss Labs, and Other (look at left 2nd toe)      HPI:     Pt here today for follow-up of HYPERLIPIDEMIA and labs. Taking Atorvastatin 20 mg nightly and Fenofibrate 145 mg nightly for HLD - stable.  Denies side effects to these.      BP well-controlled today - 114/70.     Taking Paxil 40 mg daily - stable.  Working well for her mood.       Taking Vit D3 5000 IU daily and 1000 IU nightly; also gets total of 2000 IU of Vit D3 between her MVI and Calcium supplement BID.        Stopped taking Femara 2.5 mg daily in 1/2021 after 5 years from her R breast CA diagnosis. Will still see Dr. Dasha Rodriguez yearly.     Taking Zyrtec 10 mg daily year-round.       Taking OTC Omeprazole 20 mg daily for GERD - stable; denies recent heartburn.          Past Medical History:   Diagnosis Date    Breast cancer ([facility] [address]     Cancer of right female breast ([facility] [address]     infiltrating ductal carcinoma T10 N0 M0 ER positive    Cataract of left eye     extraction 3/13/18    Cataract of right eye     extraction 2/15/18    Depression     GERD (gastroesophageal reflux disease)     Glaucoma     Dr Lissett Booker History of gastroenteritis 1994    Hospitalized for gastroenteritis and dehydration.  History of therapeutic radiation     Hyperlipidemia     Hypertension     Controlled with diet and exercise.  OA (osteoarthritis)     Seasonal allergies       Past Surgical History:   Procedure Laterality Date    BREAST BIOPSY Right     BREAST LUMPECTOMY Right     BREAST SURGERY Right 12/14/2015    lumpectomy with sentinal node biopsy    BUNIONECTOMY      Remote.  COLONOSCOPY  1/28/2013    Grade 1 internal hemorrhoids.  Otherwise normal.    DILATION AND CURETTAGE OF UTERUS 12/29/2005    Hysteroscopy.  HEMORRHOID SURGERY      rubber banding by Dr. Gustavo Paul 10/28/16    INTRACAPSULAR CATARACT EXTRACTION Right 02/15/2018    Dr Florence Chu Left 03/13/2018    Dr Stephanie Ch ARTHROSCOPY Right 7/26/2002    Partial medial meniscectomy.  MENISCECTOMY Right 7/2002    Medial.    [place] SONO GUIDE NEEDLE BIOPSY Right 11/20/2015    Right breast    TOE OSTEOTOMY Right 2009    Metatarsal osteotomy of right 1st digit.     TOTAL KNEE ARTHROPLASTY Left 11/9/2011    TOTAL KNEE ARTHROPLASTY Right 12/3/2010    UPPER GASTROINTESTINAL ENDOSCOPY  2/2004     Family History   Problem Relation Age of Onset    Dementia Mother     Breast Cancer Sister     Breast Cancer Sister     No Known Problems Sister     Cancer Sister         colon cancer    No Known Problems Sister     No Known Problems Maternal Aunt     No Known Problems Maternal Aunt     No Known Problems Maternal Aunt     No Known Problems Maternal Aunt     No Known Problems Maternal Aunt     No Known Problems Maternal Aunt      Social History     Tobacco Use    Smoking status: Never Smoker    Smokeless tobacco: Never Used   Substance Use Topics    Alcohol use: No     Alcohol/week: 0.0 standard drinks      Current Outpatient Medications   Medication Sig Dispense Refill    atorvastatin (LIPITOR) 20 MG tablet Take 1 tablet by mouth daily 90 tablet 3    PARoxetine (PAXIL) 20 MG tablet Take 1 tablet by mouth daily 90 tablet 3    fenofibrate (TRICOR) 145 MG tablet Take 1 tablet by mouth daily 90 tablet 0    Multiple Vitamins-Minerals (ICAPS AREDS 2 PO) Take 2 capsules by mouth daily      latanoprost (XALATAN) 0.005 % ophthalmic solution Place 1 drop into both eyes nightly      vitamin D (CHOLECALCIFEROL) 125 MCG (5000 UT) CAPS capsule Take 5,000 Units by mouth daily      Multiple Vitamin (MULTI-VITAMIN PO) Take 1 tablet by mouth daily      calcium citrate-vitamin D (CITRACAL+D) 315-200 MG-UNIT per tablet Take 1 tablet by mouth      timolol (TIMOPTIC) 0.5 % ophthalmic solution       omeprazole (PRILOSEC) 20 MG capsule Take 1 capsule by mouth daily. 90 capsule 1    cetirizine (ZYRTEC) 10 MG tablet Take 10 mg by mouth daily.  amoxicillin-clavulanate (AUGMENTIN) 875-125 MG per tablet Take 1 tablet by mouth 2 times daily for 10 days With food. 20 tablet 0     No current facility-administered medications for this visit. No Known Allergies    Health Maintenance   Topic Date Due    Breast cancer screen  07/22/2022    Depression Monitoring  11/02/2022    Annual Wellness Visit (AWV)  11/03/2022    Colorectal Cancer Screen  01/28/2023    Lipids  04/26/2023    DTaP/Tdap/Td vaccine (2 - Td or Tdap) 05/09/2027    DEXA (modify frequency per FRAX score)  Completed    Flu vaccine  Completed    Shingles vaccine  Completed    Pneumococcal 65+ years Vaccine  Completed    COVID-19 Vaccine  Completed    Hepatitis C screen  Completed    Hepatitis A vaccine  Aged Out    Hepatitis B vaccine  Aged Out    Hib vaccine  Aged Out    Meningococcal (ACWY) vaccine  Aged Out       Subjective:      Review of Systems   Constitutional: Unexpected weight change: up 7 lbs since her last OV in 11/2021. Musculoskeletal: Positive for arthralgias (L 2nd toe - stubbed it 3 months ago and still mildly swollen; no significant pain). Objective:     Vitals:    05/02/22 0936   BP: 114/70   Site: Right Upper Arm   Position: Sitting   Cuff Size: Large Adult   Pulse: 61   Temp: 97.3 °F (36.3 °C)   TempSrc: Temporal   SpO2: 97%   Weight: 213 lb 6.4 oz (96.8 kg)   Height: 5' 6\" (1.676 m)     Physical Exam  Vitals and nursing note reviewed. Constitutional:       General: She is not in acute distress. Appearance: She is well-developed. HENT:      Head: Normocephalic and atraumatic.       Right Ear: Tympanic membrane, ear canal and external ear normal.      Left Ear: Tympanic membrane, ear canal and external ear normal.      Nose: Nose normal.      Mouth/Throat:      Mouth: Mucous membranes are moist.      Pharynx: Oropharynx is clear. No posterior oropharyngeal erythema. Eyes:      Conjunctiva/sclera: Conjunctivae normal.   Cardiovascular:      Rate and Rhythm: Normal rate and regular rhythm. Heart sounds: Normal heart sounds. Pulmonary:      Effort: Pulmonary effort is normal. No respiratory distress. Breath sounds: Normal breath sounds. Abdominal:      General: Bowel sounds are normal. There is no distension. Palpations: Abdomen is soft. Tenderness: There is no abdominal tenderness. Musculoskeletal:      Cervical back: Neck supple. Skin:     General: Skin is warm and dry. Neurological:      Mental Status: She is alert and oriented to person, place, and time. Assessment:      1. Hyperlipidemia, unspecified hyperlipidemia type  -     atorvastatin (LIPITOR) 20 MG tablet; Take 1 tablet by mouth daily, Disp-90 tablet, R-3Normal  2. Depression, unspecified depression type  -     PARoxetine (PAXIL) 20 MG tablet; Take 1 tablet by mouth daily, Disp-90 tablet, R-3Normal  3. Vitamin D deficiency  4. Malignant neoplasm of lower-outer quadrant of right breast of female, estrogen receptor positive ([facility] [address]  5. Localized swelling of toe of left foot         Plan:      Return in about 6 months (around 11/2/2022) for f/u HLD. No orders of the defined types were placed in this encounter. Orders Placed This Encounter   Medications    atorvastatin (LIPITOR) 20 MG tablet     Sig: Take 1 tablet by mouth daily     Dispense:  90 tablet     Refill:  3    PARoxetine (PAXIL) 20 MG tablet     Sig: Take 1 tablet by mouth daily     Dispense:  90 tablet     Refill:  3       Patient given educational materials - see patient instructions. Discussed use, benefit, and side effects of prescribed medications. All patient questions answered. Pt voiced understanding.   Reviewed health maintenance.             Electronically signed by Cecy Davila DO, DO on 6/5/2022 at 11:53 PM

## 2022-05-26 ENCOUNTER — OFFICE VISIT (OUTPATIENT)
Dept: PRIMARY CARE CLINIC | Age: 67
End: 2022-05-26
Payer: MEDICARE

## 2022-05-26 VITALS
BODY MASS INDEX: 34.99 KG/M2 | OXYGEN SATURATION: 97 % | HEART RATE: 79 BPM | SYSTOLIC BLOOD PRESSURE: 118 MMHG | HEIGHT: 65 IN | TEMPERATURE: 98.2 F | DIASTOLIC BLOOD PRESSURE: 82 MMHG | WEIGHT: 210 LBS
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
## 2022-05-26 PROCEDURE — 99213 OFFICE O/P EST LOW 20 MIN: CPT

## 2022-05-26 PROCEDURE — 1123F ACP DISCUSS/DSCN MKR DOCD: CPT | Performed by: FAMILY MEDICINE

## 2022-05-26 PROCEDURE — 99213 OFFICE O/P EST LOW 20 MIN: CPT | Performed by: FAMILY MEDICINE

## 2022-05-26 RX ORDER — AMOXICILLIN AND CLAVULANATE POTASSIUM 875; 125 MG/1; MG/1
1 TABLET, FILM COATED ORAL 2 TIMES DAILY
Qty: 20 TABLET | Refills: 0 | Status: SHIPPED | OUTPATIENT
Start: 2022-05-26 | End: 2022-06-05

## 2022-05-26 SDOH — ECONOMIC STABILITY: FOOD INSECURITY: WITHIN THE PAST 12 MONTHS, THE FOOD YOU BOUGHT JUST DIDN'T LAST AND YOU DIDN'T HAVE MONEY TO GET MORE.: NEVER TRUE

## 2022-05-26 SDOH — ECONOMIC STABILITY: FOOD INSECURITY: WITHIN THE PAST 12 MONTHS, YOU WORRIED THAT YOUR FOOD WOULD RUN OUT BEFORE YOU GOT MONEY TO BUY MORE.: NEVER TRUE

## 2022-05-26 ASSESSMENT — SOCIAL DETERMINANTS OF HEALTH (SDOH): HOW HARD IS IT FOR YOU TO PAY FOR THE VERY BASICS LIKE FOOD, HOUSING, MEDICAL CARE, AND HEATING?: NOT HARD AT ALL

## 2022-05-26 ASSESSMENT — ENCOUNTER SYMPTOMS
RESPIRATORY NEGATIVE: 1
GASTROINTESTINAL NEGATIVE: 1
EYES NEGATIVE: 1
ALLERGIC/IMMUNOLOGIC NEGATIVE: 1

## 2022-05-26 NOTE — PROGRESS NOTES
Subjective:      Patient ID: Anthony Steen is a 78 y.o. female. HPI   Acute urgent care visit for dog bite to her left index finger. She relates her dog was lying in the driveway and she hit the dog with the car. Dog ran and she tried to pick it up and it bit her on the finger. 2 weeks ago. Some stiffness and light erythema remains. Feels stiff and sore at the dip joint. No fever. Past Medical History:   Diagnosis Date    Breast cancer ([facility] [address]     Cancer of right female breast ([facility] [address]     infiltrating ductal carcinoma T10 N0 M0 ER positive    Cataract of left eye     extraction 3/13/18    Cataract of right eye     extraction 2/15/18    Depression     GERD (gastroesophageal reflux disease)     Glaucoma     Dr Kamaljit Mujica History of gastroenteritis 1994    Hospitalized for gastroenteritis and dehydration.  History of therapeutic radiation     Hyperlipidemia     Hypertension     Controlled with diet and exercise.  OA (osteoarthritis)     Seasonal allergies      Past Surgical History:   Procedure Laterality Date    BREAST BIOPSY Right     BREAST LUMPECTOMY Right     BREAST SURGERY Right 12/14/2015    lumpectomy with sentinal node biopsy    BUNIONECTOMY      Remote.  COLONOSCOPY  1/28/2013    Grade 1 internal hemorrhoids. Otherwise normal.    DILATION AND CURETTAGE OF UTERUS  12/29/2005    Hysteroscopy.  HEMORRHOID SURGERY      rubber banding by Dr. Governor Turcios 10/28/16    INTRACAPSULAR CATARACT EXTRACTION Right 02/15/2018    Dr Lady Mack Left 03/13/2018    Dr Ryan Corrales ARTHROSCOPY Right 7/26/2002    Partial medial meniscectomy.  MENISCECTOMY Right 7/2002    Medial.    [place] SONO GUIDE NEEDLE BIOPSY Right 11/20/2015    Right breast    TOE OSTEOTOMY Right 2009    Metatarsal osteotomy of right 1st digit.     TOTAL KNEE ARTHROPLASTY Left 11/9/2011    TOTAL KNEE ARTHROPLASTY Right 12/3/2010    UPPER GASTROINTESTINAL ENDOSCOPY  2/2004 Current Outpatient Medications   Medication Sig Dispense Refill    atorvastatin (LIPITOR) 20 MG tablet Take 1 tablet by mouth daily 90 tablet 3    PARoxetine (PAXIL) 20 MG tablet Take 1 tablet by mouth daily 90 tablet 3    fenofibrate (TRICOR) 145 MG tablet Take 1 tablet by mouth daily 90 tablet 0    Multiple Vitamins-Minerals (ICAPS AREDS 2 PO) Take 2 capsules by mouth daily      latanoprost (XALATAN) 0.005 % ophthalmic solution Place 1 drop into both eyes nightly      vitamin D (CHOLECALCIFEROL) 125 MCG (5000 UT) CAPS capsule Take 5,000 Units by mouth daily      Multiple Vitamin (MULTI-VITAMIN PO) Take 1 tablet by mouth daily      calcium citrate-vitamin D (CITRACAL+D) 315-200 MG-UNIT per tablet Take 1 tablet by mouth      timolol (TIMOPTIC) 0.5 % ophthalmic solution       omeprazole (PRILOSEC) 20 MG capsule Take 1 capsule by mouth daily. 90 capsule 1    cetirizine (ZYRTEC) 10 MG tablet Take 10 mg by mouth daily. No current facility-administered medications for this visit. Review of Systems   Constitutional: Negative. HENT: Negative. Eyes: Negative. Respiratory: Negative. Cardiovascular: Negative. Gastrointestinal: Negative. Endocrine: Negative. Genitourinary: Negative. Musculoskeletal: Negative. Skin: Positive for wound (left index finger). Allergic/Immunologic: Negative. Neurological: Negative. Hematological: Negative. Psychiatric/Behavioral: Negative. Objective:   Physical Exam  Constitutional:       General: She is not in acute distress. Appearance: She is well-developed. HENT:      Head: Normocephalic and atraumatic. Right Ear: External ear normal.      Left Ear: External ear normal.      Mouth/Throat:      Pharynx: No oropharyngeal exudate. Eyes:      General: No scleral icterus. Conjunctiva/sclera: Conjunctivae normal.   Neck:      Thyroid: No thyromegaly.    Cardiovascular:      Rate and Rhythm: Normal rate and regular rhythm. Heart sounds: Normal heart sounds. No murmur heard. Pulmonary:      Effort: Pulmonary effort is normal. No respiratory distress. Breath sounds: Normal breath sounds. No wheezing. Abdominal:      General: Bowel sounds are normal. There is no distension. Palpations: Abdomen is soft. Tenderness: There is no abdominal tenderness. There is no rebound. Musculoskeletal:         General: No tenderness. Normal range of motion. Arms:       Cervical back: Neck supple. Skin:     General: Skin is warm and dry. Findings: No erythema or rash. Neurological:      Mental Status: She is alert and oriented to person, place, and time. Psychiatric:         Behavior: Behavior normal.         Thought Content: Thought content normal.         Judgment: Judgment normal.       /82 (Site: Left Upper Arm, Position: Sitting, Cuff Size: Medium Adult)   Pulse 79   Temp 98.2 °F (36.8 °C) (Tympanic)   Ht 5' 5\" (1.651 m)   Wt 210 lb (95.3 kg)   SpO2 97% Comment: room air  BMI 34.95 kg/m²     Assessment:      Encounter Diagnosis   Name Primary?  Dog bite, initial encounter Yes        Plan:      Concerns for cellulitis. Starting augmentin bid x 10 days. Return for concerns for infection. Tetanus up to date.        Adria Anaya MD

## 2022-07-12 DIAGNOSIS — E78.5 HYPERLIPIDEMIA, UNSPECIFIED HYPERLIPIDEMIA TYPE: ICD-10-CM

## 2022-07-12 NOTE — TELEPHONE ENCOUNTER
Haseeb Monson called requesting a refill of the below medication which has been pended for you:     Requested Prescriptions     Pending Prescriptions Disp Refills    fenofibrate (TRICOR) 145 MG tablet [Pharmacy Med Name: FENOFIBRATE TABS 145MG] 90 tablet 3     Sig: TAKE 1 TABLET DAILY       Last Appointment Date: 5/2/2022  Next Appointment Date: 11/2/2022    No Known Allergies

## 2022-07-15 RX ORDER — FENOFIBRATE 145 MG/1
TABLET, COATED ORAL
Qty: 90 TABLET | Refills: 2 | Status: SHIPPED | OUTPATIENT
Start: 2022-07-15

## 2022-07-26 ENCOUNTER — HOSPITAL ENCOUNTER (OUTPATIENT)
Dept: MAMMOGRAPHY | Age: 67
Discharge: HOME | End: 2022-07-28
Payer: MEDICARE

## 2022-07-26 ENCOUNTER — HOSPITAL ENCOUNTER (OUTPATIENT)
Dept: BONE DENSITY | Age: 67
Discharge: HOME | End: 2022-07-28
Payer: MEDICARE

## 2022-07-26 DIAGNOSIS — Z79.811 LONG TERM CURRENT USE OF AROMATASE INHIBITOR: ICD-10-CM

## 2022-07-26 DIAGNOSIS — Z12.31 SCREENING MAMMOGRAM, ENCOUNTER FOR: ICD-10-CM

## 2022-07-26 PROCEDURE — 77063 BREAST TOMOSYNTHESIS BI: CPT

## 2022-07-26 PROCEDURE — 77085 DXA BONE DENSITY AXL VRT FX: CPT

## 2022-08-15 ENCOUNTER — OFFICE VISIT (OUTPATIENT)
Dept: ONCOLOGY | Age: 67
End: 2022-08-15
Payer: MEDICARE

## 2022-08-15 VITALS
OXYGEN SATURATION: 97 % | BODY MASS INDEX: 35.16 KG/M2 | DIASTOLIC BLOOD PRESSURE: 74 MMHG | TEMPERATURE: 97.6 F | HEIGHT: 65 IN | WEIGHT: 211 LBS | SYSTOLIC BLOOD PRESSURE: 112 MMHG | HEART RATE: 74 BPM

## 2022-08-15 DIAGNOSIS — Z12.31 SCREENING MAMMOGRAM, ENCOUNTER FOR: Primary | ICD-10-CM

## 2022-08-15 PROCEDURE — 99215 OFFICE O/P EST HI 40 MIN: CPT | Performed by: INTERNAL MEDICINE

## 2022-08-15 PROCEDURE — 1123F ACP DISCUSS/DSCN MKR DOCD: CPT | Performed by: INTERNAL MEDICINE

## 2022-08-15 PROCEDURE — 99214 OFFICE O/P EST MOD 30 MIN: CPT | Performed by: INTERNAL MEDICINE

## 2022-08-15 RX ORDER — BRIMONIDINE TARTRATE 2 MG/ML
SOLUTION/ DROPS OPHTHALMIC 2 TIMES DAILY
COMMUNITY
Start: 2022-08-06

## 2022-08-15 NOTE — PROGRESS NOTES
Patient ID: Mojgan Roque, 1955, [id number], 78 y.o. Diagnosis:   Right-sided breast cancer,T1cN0, status post lumpectomy with sentinel lymph node biopsy in December 2015  Adjuvant radiation therapy completed in February 2016  Started on Femara March 2016  STOpped in January 2021 as her BCI was low and no benefits from extended therapy noted  Currently on annual surveillance  HISTORY OF PRESENT ILLNESS:    Oncologic History: This is a 67-year-old female who was diagnosed with right-sided breast cancer in November 2015 and subsequently had lumpectomy with sentinel lymph node biopsy in December 2015. The pathology was positive for well-differentiated ductal carcinoma measuring 1.6 cm with low-grade ductal carcinoma in situ. There was also atypical lobular hyperplasia and lobular carcinoma in situ noted. Final surgical pathology showed P T1cp N0 disease, ER/DE positive and HER-2/александр negative. Patient completed radiation therapy in February 2016 and started on endocrine therapy with Femara in March 2016. Interval history:  Patient is returning for follow-up visit and to discuss results of lab work, mammogram results, DEXA scan results and further recommendations. Her DEXA scan showed osteopenia. She denies any new lump, mass in her breast.  She denies any unintentional weight loss, drenching night sweats fever chills. During this visit patient's allergy, social, medical, surgical history and medications were reviewed and updated.   No Known Allergies    Current Outpatient Medications   Medication Sig Dispense Refill    brimonidine (ALPHAGAN) 0.2 % ophthalmic solution 3 times daily      fenofibrate (TRICOR) 145 MG tablet TAKE 1 TABLET DAILY 90 tablet 2    atorvastatin (LIPITOR) 20 MG tablet Take 1 tablet by mouth daily 90 tablet 3    PARoxetine (PAXIL) 20 MG tablet Take 1 tablet by mouth daily 90 tablet 3    Multiple Vitamins-Minerals (ICAPS AREDS 2 PO) Take 2 capsules by mouth daily      latanoprost (XALATAN) 0.005 % ophthalmic solution Place 1 drop into both eyes nightly      vitamin D (CHOLECALCIFEROL) 125 MCG (5000 UT) CAPS capsule Take 5,000 Units by mouth daily      Multiple Vitamin (MULTI-VITAMIN PO) Take 1 tablet by mouth daily      calcium citrate-vitamin D (CITRACAL+D) 315-200 MG-UNIT per tablet Take 1 tablet by mouth      timolol (TIMOPTIC) 0.5 % ophthalmic solution       omeprazole (PRILOSEC) 20 MG capsule Take 1 capsule by mouth daily. 90 capsule 1    cetirizine (ZYRTEC) 10 MG tablet Take 10 mg by mouth daily. No current facility-administered medications for this visit. REVIEW OF SYSTEM:   Constitutional: No fever or chills. No night sweats, no weight loss   Eyes: No eye discharge, double vision, or eye pain   HEENT: negative for sore mouth, sore throat, hoarseness and voice change   Respiratory: negative for cough , sputum, dyspnea, wheezing, hemoptysis, chest pain   Cardiovascular: negative for chest pain, dyspnea, palpitations, orthopnea, PND   Gastrointestinal: negative for nausea, vomiting, diarrhea, constipation, abdominal pain, Dysphagia, hematemesis and hematochezia   Genitourinary: negative for frequency, dysuria, nocturia, urinary incontinence, and hematuria   Integument: negative for rash, skin lesions, bruises.    Hematologic/Lymphatic: negative for easy bruising, bleeding, lymphadenopathy, petechiae and swelling/edema   Endocrine: negative for heat or cold intolerance, tremor, weight changes, change in bowel habits and hair loss   Musculoskeletal: negative for myalgias, arthralgias, pain, joint swelling,and bone pain   Neurological: negative for headaches, dizziness, seizures, weakness, numbness     OBJECTIVE:         Vitals:    08/15/22 1302   BP: 112/74   Pulse: 74   Temp: 97.6 °F (36.4 °C)   SpO2: 97%   PHYSICAL EXAM:   General appearance - well appearing, no in pain or distress   Mental status - alert and cooperative   Eyes - pupils equal and clinic in 1 year with mammogram prior or earlier if needed       I spent more than 35 minutes examining, evaluating, reviewing data and counseling the patient. Greater than 50% of that time was spent face-to-face with the patient in counseling and coordinating her care.     John Palacio MD  Hematology/Oncology

## 2022-11-02 ENCOUNTER — OFFICE VISIT (OUTPATIENT)
Dept: FAMILY MEDICINE CLINIC | Age: 67
End: 2022-11-02
Payer: MEDICARE

## 2022-11-02 VITALS
HEIGHT: 65 IN | DIASTOLIC BLOOD PRESSURE: 76 MMHG | WEIGHT: 214 LBS | TEMPERATURE: 97.2 F | HEART RATE: 72 BPM | OXYGEN SATURATION: 98 % | BODY MASS INDEX: 35.65 KG/M2 | SYSTOLIC BLOOD PRESSURE: 110 MMHG

## 2022-11-02 DIAGNOSIS — Z12.11 SCREEN FOR COLON CANCER: ICD-10-CM

## 2022-11-02 DIAGNOSIS — M62.838 TRAPEZIUS MUSCLE SPASM: ICD-10-CM

## 2022-11-02 DIAGNOSIS — K21.9 GASTROESOPHAGEAL REFLUX DISEASE, UNSPECIFIED WHETHER ESOPHAGITIS PRESENT: ICD-10-CM

## 2022-11-02 DIAGNOSIS — M54.2 NECK PAIN ON RIGHT SIDE: Primary | ICD-10-CM

## 2022-11-02 DIAGNOSIS — E78.5 HYPERLIPIDEMIA, UNSPECIFIED HYPERLIPIDEMIA TYPE: ICD-10-CM

## 2022-11-02 DIAGNOSIS — K64.9 HEMORRHOIDS, UNSPECIFIED HEMORRHOID TYPE: ICD-10-CM

## 2022-11-02 DIAGNOSIS — E55.9 VITAMIN D DEFICIENCY: ICD-10-CM

## 2022-11-02 PROBLEM — H04.123 DRY EYES, BILATERAL: Status: ACTIVE | Noted: 2022-06-06

## 2022-11-02 PROCEDURE — 1123F ACP DISCUSS/DSCN MKR DOCD: CPT | Performed by: FAMILY MEDICINE

## 2022-11-02 PROCEDURE — 99214 OFFICE O/P EST MOD 30 MIN: CPT | Performed by: FAMILY MEDICINE

## 2022-11-02 PROCEDURE — 99213 OFFICE O/P EST LOW 20 MIN: CPT | Performed by: FAMILY MEDICINE

## 2022-11-02 RX ORDER — DORZOLAMIDE HYDROCHLORIDE AND TIMOLOL MALEATE 20; 5 MG/ML; MG/ML
1 SOLUTION/ DROPS OPHTHALMIC 2 TIMES DAILY
COMMUNITY
Start: 2022-09-07 | End: 2023-09-07

## 2022-11-02 RX ORDER — LETROZOLE 2.5 MG/1
2.5 TABLET, FILM COATED ORAL DAILY
COMMUNITY
End: 2022-11-02 | Stop reason: ALTCHOICE

## 2022-11-02 RX ORDER — MONTELUKAST SODIUM 10 MG/1
10 TABLET ORAL NIGHTLY
COMMUNITY
End: 2022-11-02 | Stop reason: ALTCHOICE

## 2022-11-02 ASSESSMENT — ENCOUNTER SYMPTOMS
ANAL BLEEDING: 1
SHORTNESS OF BREATH: 0

## 2022-11-02 ASSESSMENT — PATIENT HEALTH QUESTIONNAIRE - PHQ9
5. POOR APPETITE OR OVEREATING: 0
9. THOUGHTS THAT YOU WOULD BE BETTER OFF DEAD, OR OF HURTING YOURSELF: 0
6. FEELING BAD ABOUT YOURSELF - OR THAT YOU ARE A FAILURE OR HAVE LET YOURSELF OR YOUR FAMILY DOWN: 0
2. FEELING DOWN, DEPRESSED OR HOPELESS: 0
1. LITTLE INTEREST OR PLEASURE IN DOING THINGS: 0
10. IF YOU CHECKED OFF ANY PROBLEMS, HOW DIFFICULT HAVE THESE PROBLEMS MADE IT FOR YOU TO DO YOUR WORK, TAKE CARE OF THINGS AT HOME, OR GET ALONG WITH OTHER PEOPLE: 0
SUM OF ALL RESPONSES TO PHQ9 QUESTIONS 1 & 2: 0
4. FEELING TIRED OR HAVING LITTLE ENERGY: 0
3. TROUBLE FALLING OR STAYING ASLEEP: 0
SUM OF ALL RESPONSES TO PHQ QUESTIONS 1-9: 0
7. TROUBLE CONCENTRATING ON THINGS, SUCH AS READING THE NEWSPAPER OR WATCHING TELEVISION: 0
SUM OF ALL RESPONSES TO PHQ QUESTIONS 1-9: 0
8. MOVING OR SPEAKING SO SLOWLY THAT OTHER PEOPLE COULD HAVE NOTICED. OR THE OPPOSITE, BEING SO FIGETY OR RESTLESS THAT YOU HAVE BEEN MOVING AROUND A LOT MORE THAN USUAL: 0

## 2022-11-02 NOTE — PROGRESS NOTES
[address]  [address], [address]  [phone]      Rosalinda Diaz is a 78 y.o. female who presents today for her medical conditions/complaints as noted below. Rosalinda Diaz is c/o of Hyperlipidemia, Neck Pain (Right side, pain from right neck to right shoulder, NKI), and Hemorrhoids      HPI:     Pt here today for follow-up of HYPERLIPIDEMIA and R-sided neck pain. Pt has always had chronic tight muscles; has gotten massages once per month for awhile. However, she is still specifically having trouble with pain in her R neck and posterior shoulder. Harder to rotate her head to the left; hearing clicking in her neck when she turns her head. Pain is constantly present at some level; worse with increased humidity and stormy weather. Knows she carries a lot of tension in her shoulders when she gets stressed. Has tried stretches and heat at night; will take Ibuprofen when it gets really bad. Taking Atorvastatin 20 mg nightly and Fenofibrate 145 mg nightly for HLD - stable. Denies side effects to these. BP well-controlled today - 110/76. Taking Paxil 20 mg daily - stable. Feels better on this dose with her hot flashes, and feels it is working well for her mood. Taking Vit D3 5000 IU daily and 1000 IU nightly; also gets total of 2000 IU of Vit D3 between her MVI and Calcium supplement BID. Seeing Dr. Vika Magdaleno once yearly for h/o breast CA - last visit on 8/15. Last mammogram was negative on 7/26. Taking Zyrtec 10 mg daily year-round. Taking OTC Omeprazole 20 mg daily for GERD - stable; denies recent heartburn. Has h/o hemorrhoid banding with Dr. Maye Coffman in 2016; this summer, she has started to have some bleeding again. Has not happened recently. Also has noticed some itching.   Using flushable wipes and Proctosol ointment as needed; will also use medicated powder when she is sweating more in the area.    Seeing Dr. Teresa Gaffney for open angle glaucoma of both eyes - using Latanoprost drops nightly, Alphagan drops BID, and Cosopt drops BID. Has seen her monthly x past 5 months as they were trying different drops and getting a new regimen for her to keep pressures stable. Will see her again in 2- 3 months. Past Medical History:   Diagnosis Date    Breast cancer [facility])     Cancer of right female breast ([address]     infiltrating ductal carcinoma T10 N0 M0 ER positive    Cataract of left eye     extraction 3/13/18    Cataract of right eye     extraction 2/15/18    Depression     GERD (gastroesophageal reflux disease)     Glaucoma     Dr Clifton Dobson    History of gastroenteritis 1994    Hospitalized for gastroenteritis and dehydration. History of therapeutic radiation     Hyperlipidemia     Hypertension     Controlled with diet and exercise. OA (osteoarthritis)     Seasonal allergies       Past Surgical History:   Procedure Laterality Date    BREAST BIOPSY Right     BREAST LUMPECTOMY Right     BREAST SURGERY Right 12/14/2015    lumpectomy with sentinal node biopsy    BUNIONECTOMY      Remote. COLONOSCOPY  1/28/2013    Grade 1 internal hemorrhoids. Otherwise normal.    DILATION AND CURETTAGE OF UTERUS  12/29/2005    Hysteroscopy. HEMORRHOID SURGERY      rubber banding by Dr. Gtz Distance 10/28/16    INTRACAPSULAR CATARACT EXTRACTION Right 02/15/2018    Dr Michelle Contreras EXTRACTION Left 03/13/2018    Dr Gongora Fearing ARTHROSCOPY Right 7/26/2002    Partial medial meniscectomy. MENISCECTOMY Right 7/2002    Medial.    MD SONO GUIDE NEEDLE BIOPSY Right 11/20/2015    Right breast    TOE OSTEOTOMY Right 2009    Metatarsal osteotomy of right 1st digit.     TOTAL KNEE ARTHROPLASTY Left 11/9/2011    TOTAL KNEE ARTHROPLASTY Right 12/3/2010    UPPER GASTROINTESTINAL ENDOSCOPY  2/2004     Family History   Problem Relation Age of Onset    Dementia Mother     Breast Cancer Sister     Breast Cancer Sister     No Known Problems Sister     Colon Cancer Sister         colon cancer    No Known Problems Sister     No Known Problems Maternal Aunt     No Known Problems Maternal Aunt     No Known Problems Maternal Aunt     No Known Problems Maternal Aunt     No Known Problems Maternal Aunt     No Known Problems Maternal Aunt      Social History     Tobacco Use    Smoking status: Never    Smokeless tobacco: Never   Substance Use Topics    Alcohol use: No     Alcohol/week: 0.0 standard drinks      Current Outpatient Medications   Medication Sig Dispense Refill    dorzolamide-timolol (COSOPT) 22.3-6.8 MG/ML ophthalmic solution Apply 1 drop to eye 2 times daily      hydrocortisone 2.5 % cream Apply topically 2 times daily Apply topically 2 times daily. brimonidine (ALPHAGAN) 0.2 % ophthalmic solution in the morning and at bedtime      fenofibrate (TRICOR) 145 MG tablet TAKE 1 TABLET DAILY 90 tablet 2    atorvastatin (LIPITOR) 20 MG tablet Take 1 tablet by mouth daily 90 tablet 3    PARoxetine (PAXIL) 20 MG tablet Take 1 tablet by mouth daily 90 tablet 3    Multiple Vitamins-Minerals (ICAPS AREDS 2 PO) Take 2 capsules by mouth daily      latanoprost (XALATAN) 0.005 % ophthalmic solution Place 1 drop into both eyes nightly      vitamin D (CHOLECALCIFEROL) 125 MCG (5000 UT) CAPS capsule Take 5,000 Units by mouth daily      Multiple Vitamin (MULTI-VITAMIN PO) Take 1 tablet by mouth daily      calcium citrate-vitamin D (CITRACAL+D) 315-200 MG-UNIT per tablet Take 1 tablet by mouth      omeprazole (PRILOSEC) 20 MG capsule Take 1 capsule by mouth daily. 90 capsule 1    cetirizine (ZYRTEC) 10 MG tablet Take 10 mg by mouth daily. bisacodyl 5 MG EC tablet Take two tabs by mouth at noon the day prior to your procedure 2 tablet 0     No current facility-administered medications for this visit.      No Known Allergies    Health Maintenance   Topic Date Due    Colorectal Cancer Screen  01/28/2023    Lipids  04/26/2023 Breast cancer screen  07/26/2023    Depression Monitoring  11/07/2023    Annual Wellness Visit (AWV)  11/08/2023    DTaP/Tdap/Td vaccine (2 - Td or Tdap) 05/09/2027    DEXA (modify frequency per FRAX score)  Completed    Flu vaccine  Completed    Shingles vaccine  Completed    Pneumococcal 65+ years Vaccine  Completed    COVID-19 Vaccine  Completed    Hepatitis C screen  Completed    Hepatitis A vaccine  Aged Out    Hib vaccine  Aged Out    Meningococcal (ACWY) vaccine  Aged Out       Subjective:      Review of Systems   Constitutional:  Negative for fever and unexpected weight change. Respiratory:  Negative for shortness of breath. Cardiovascular:  Negative for chest pain and palpitations. Gastrointestinal:  Positive for anal bleeding (infrequent). Genitourinary:  Negative for dyspareunia and hematuria. Musculoskeletal:  Positive for myalgias (R lateral neck and posterior shoulder/scapula). Skin:  Negative for rash and wound. Neurological:  Negative for dizziness, light-headedness and numbness. Psychiatric/Behavioral:  Negative for dysphoric mood and suicidal ideas. The patient is not nervous/anxious. Objective:     Vitals:    11/02/22 0855   BP: 110/76   Site: Right Upper Arm   Position: Sitting   Cuff Size: Large Adult   Pulse: 72   Temp: 97.2 °F (36.2 °C)   TempSrc: Temporal   SpO2: 98%   Weight: 214 lb (97.1 kg)   Height: 5' 5\" (1.651 m)     Physical Exam  Vitals and nursing note reviewed. Constitutional:       General: She is not in acute distress. Appearance: She is well-developed. HENT:      Head: Normocephalic and atraumatic. Right Ear: Tympanic membrane, ear canal and external ear normal.      Left Ear: Tympanic membrane, ear canal and external ear normal.      Nose: Nose normal.      Mouth/Throat:      Mouth: Mucous membranes are moist.      Pharynx: Oropharynx is clear. No posterior oropharyngeal erythema.    Eyes:      Conjunctiva/sclera: Conjunctivae normal. Cardiovascular:      Rate and Rhythm: Normal rate and regular rhythm. Heart sounds: Normal heart sounds. Pulmonary:      Effort: Pulmonary effort is normal. No respiratory distress. Breath sounds: Normal breath sounds. Abdominal:      General: Bowel sounds are normal. There is no distension. Palpations: Abdomen is soft. Tenderness: There is no abdominal tenderness. Musculoskeletal:      Cervical back: Neck supple. Skin:     General: Skin is warm and dry. Neurological:      Mental Status: She is alert and oriented to person, place, and time. Assessment:      1. Neck pain on right side  -     External Referral To Physical Therapy  2. Trapezius muscle spasm  -     External Referral To Physical Therapy  3. Hyperlipidemia, unspecified hyperlipidemia type  -     Comprehensive Metabolic Panel; Future  -     Lipid Panel; Future  -     CBC with Auto Differential; Future  4. Gastroesophageal reflux disease, unspecified whether esophagitis present  -     Melisa Finch MD, General Surgery, [place]  5. Vitamin D deficiency  -     Vitamin D 25 Hydroxy; Future  6. Hemorrhoids, unspecified hemorrhoid type  -     Melisa Finch MD, General Surgery, [place]  7. Screen for colon cancer  -     Melisa Finch MD, General Surgery, [place]       Plan:      Return in about 6 months (around 5/9/2023) for f/u labs, HLD, neck pain. Orders Placed This Encounter   Procedures    Comprehensive Metabolic Panel     Standing Status:   Future     Standing Expiration Date:   11/2/2023    Lipid Panel     Standing Status:   Future     Standing Expiration Date:   11/2/2023     Order Specific Question:   Is Patient Fasting?/# of Hours     Answer:   15     Order Specific Question:   Has the patient fasted?      Answer:   Yes    Vitamin D 25 Hydroxy     Standing Status:   Future     Standing Expiration Date:   11/2/2023    CBC with Auto Differential     Standing Status:   Future Standing Expiration Date:   11/2/2023    Tri Dias MD, General Surgery, [place]     Referral Priority:   Routine     Referral Type:   Eval and Treat     Referral Reason:   Specialty Services Required     Referred to Provider:   Deisy Vasquez MD     Requested Specialty:   General Surgery     Number of Visits Requested:   1    External Referral To Physical Therapy     Referral Priority:   Routine     Referral Type:   Eval and Treat     Referral Reason:   Specialty Services Required     Requested Specialty:   Physical Therapist     Number of Visits Requested:   1     No orders of the defined types were placed in this encounter. Patient given educational materials - see patient instructions. Discussed use, benefit, and side effects of prescribed medications. All patient questions answered. Pt voiced understanding. Reviewed health maintenance.             Electronically signed by Nel Kramer DO, DO on 11/13/2022 at 11:44 PM         '

## 2022-11-07 ENCOUNTER — TELEMEDICINE (OUTPATIENT)
Dept: FAMILY MEDICINE CLINIC | Age: 67
End: 2022-11-07
Payer: MEDICARE

## 2022-11-07 DIAGNOSIS — Z00.00 MEDICARE ANNUAL WELLNESS VISIT, SUBSEQUENT: Primary | ICD-10-CM

## 2022-11-07 PROCEDURE — G0439 PPPS, SUBSEQ VISIT: HCPCS | Performed by: FAMILY MEDICINE

## 2022-11-07 PROCEDURE — 1123F ACP DISCUSS/DSCN MKR DOCD: CPT | Performed by: FAMILY MEDICINE

## 2022-11-07 SDOH — HEALTH STABILITY: PHYSICAL HEALTH: ON AVERAGE, HOW MANY MINUTES DO YOU ENGAGE IN EXERCISE AT THIS LEVEL?: 50 MIN

## 2022-11-07 SDOH — HEALTH STABILITY: PHYSICAL HEALTH: ON AVERAGE, HOW MANY DAYS PER WEEK DO YOU ENGAGE IN MODERATE TO STRENUOUS EXERCISE (LIKE A BRISK WALK)?: 2 DAYS

## 2022-11-07 ASSESSMENT — LIFESTYLE VARIABLES
HOW OFTEN DO YOU HAVE A DRINK CONTAINING ALCOHOL: NEVER
HOW OFTEN DO YOU HAVE SIX OR MORE DRINKS ON ONE OCCASION: 1
HOW MANY STANDARD DRINKS CONTAINING ALCOHOL DO YOU HAVE ON A TYPICAL DAY: PATIENT DOES NOT DRINK
HOW OFTEN DO YOU HAVE A DRINK CONTAINING ALCOHOL: 1
HOW MANY STANDARD DRINKS CONTAINING ALCOHOL DO YOU HAVE ON A TYPICAL DAY: 0

## 2022-11-07 ASSESSMENT — PATIENT HEALTH QUESTIONNAIRE - PHQ9
SUM OF ALL RESPONSES TO PHQ QUESTIONS 1-9: 1
SUM OF ALL RESPONSES TO PHQ QUESTIONS 1-9: 1
SUM OF ALL RESPONSES TO PHQ9 QUESTIONS 1 & 2: 1
1. LITTLE INTEREST OR PLEASURE IN DOING THINGS: 1
2. FEELING DOWN, DEPRESSED OR HOPELESS: 0
SUM OF ALL RESPONSES TO PHQ QUESTIONS 1-9: 1
SUM OF ALL RESPONSES TO PHQ QUESTIONS 1-9: 1

## 2022-11-07 NOTE — PROGRESS NOTES
Medicare Annual Wellness Visit    Roel Taylor is here for Medicare AWV    Assessment & Plan    Recommendations for Preventive Services Due: see orders and patient instructions/AVS.  Recommended screening schedule for the next 5-10 years is provided to the patient in written form: see Patient Instructions/AVS.     No follow-ups on file. Subjective       Patient's complete Health Risk Assessment and screening values have been reviewed and are found in Flowsheets. The following problems were reviewed today and where indicated follow up appointments were made and/or referrals ordered.     Positive Risk Factor Screenings with Interventions:             General Health and ACP:  General  In general, how would you say your health is?: Good  In the past 7 days, have you experienced any of the following: New or Increased Pain, New or Increased Fatigue, Loneliness, Social Isolation, Stress or Anger?: No  Do you get the social and emotional support that you need?: (!) No  Do you have a Living Will?: Yes    Advance Directives       Power of  Living Will ACP-Advance Directive ACP-Power of     Not on File Filed on 11/02/22 Filed Not on File        General Health Risk Interventions:  Inadequate social/emotional support: patient declines any further intervention for this issue    Health Habits/Nutrition:  Physical Activity: Insufficiently Active    Days of Exercise per Week: 2 days    Minutes of Exercise per Session: 50 min     Have you lost any weight without trying in the past 3 months?: No     Have you seen the dentist within the past year?: Yes  Health Habits/Nutrition Interventions:  Inadequate physical activity:  patient is not ready to increase his/her physical activity level at this time     Safety:  Do you have working smoke detectors?: Yes  Do you have any tripping hazards - loose or unsecured carpets or rugs?: (!) Yes  Do you have any tripping hazards - clutter in doorways, halls, or stairs?: No  Do you have either shower bars, grab bars, non-slip mats or non-slip surfaces in your shower or bathtub?: Yes  Do all of your stairways have a railing or banister?: (!) No  Do you always fasten your seatbelt when you are in a car?: Yes  Safety Interventions:  Home safety tips provided           Objective      Patient-Reported Vitals  No data recorded          No Known Allergies  Prior to Visit Medications    Medication Sig Taking? Authorizing Provider   dorzolamide-timolol (COSOPT) 22.3-6.8 MG/ML ophthalmic solution Apply 1 drop to eye 2 times daily Yes Historical Provider, MD   hydrocortisone 2.5 % cream Apply topically 2 times daily Apply topically 2 times daily. Yes Historical Provider, MD   brimonidine (ALPHAGAN) 0.2 % ophthalmic solution in the morning and at bedtime Yes Historical Provider, MD   fenofibrate (TRICOR) 145 MG tablet TAKE 1 TABLET DAILY Yes Lizbeth President Black, DO   atorvastatin (LIPITOR) 20 MG tablet Take 1 tablet by mouth daily Yes Lizbeth President Black, DO   PARoxetine (PAXIL) 20 MG tablet Take 1 tablet by mouth daily Yes Lizbeth President Black, DO   Multiple Vitamins-Minerals (ICAPS AREDS 2 PO) Take 2 capsules by mouth daily Yes Historical Provider, MD   latanoprost (XALATAN) 0.005 % ophthalmic solution Place 1 drop into both eyes nightly Yes Historical Provider, MD   vitamin D (CHOLECALCIFEROL) 125 MCG (5000 UT) CAPS capsule Take 5,000 Units by mouth daily Yes Historical Provider, MD   Multiple Vitamin (MULTI-VITAMIN PO) Take 1 tablet by mouth daily Yes Historical Provider, MD   calcium citrate-vitamin D (CITRACAL+D) 315-200 MG-UNIT per tablet Take 1 tablet by mouth Yes Historical Provider, MD   omeprazole (PRILOSEC) 20 MG capsule Take 1 capsule by mouth daily. Yes Yary Montoya MD   cetirizine (ZYRTEC) 10 MG tablet Take 10 mg by mouth daily.  Yes Historical Provider, MD   bisacodyl 5 MG EC tablet Take two tabs by mouth at noon the day prior to your procedure  MD Pat Graves (Including outside providers/suppliers regularly involved in providing care):   Patient Care Team:  Tiera Reis DO as PCP - General (Family Medicine)  Tiera Reis DO as PCP - [facility] Srinivasan Byrnes Provider     Reviewed and updated this visit:  Tobacco  Allergies  Meds  Med Hx  Surg Hx  Soc Hx  Fam Hx          Michelle Fraction, was evaluated through a synchronous (real-time) audio  encounter. The patient (or guardian if applicable) is aware that this is a billable service, which includes applicable co-pays. This Virtual Visit was conducted with patient's (and/or legal guardian's) consent. The visit was conducted pursuant to the emergency declaration under the [facility], [facility] authority and the [employer] General Act. Patient identification was verified, and a caregiver was present when appropriate. The patient was located at Home: [address] Provider was located at [facility] ([address] [place] [address]): [address] [place] [address],  [address] [facility] Ilia Self. This encounter was performed under myTiera DOs, direct supervision, 11/7/2022.

## 2022-11-07 NOTE — PATIENT INSTRUCTIONS
Personalized Preventive Plan for Ciarra Allison - 11/7/2022  Medicare offers a range of preventive health benefits. Some of the tests and screenings are paid in full while other may be subject to a deductible, co-insurance, and/or copay. Some of these benefits include a comprehensive review of your medical history including lifestyle, illnesses that may run in your family, and various assessments and screenings as appropriate. After reviewing your medical record and screening and assessments performed today your provider may have ordered immunizations, labs, imaging, and/or referrals for you. A list of these orders (if applicable) as well as your Preventive Care list are included within your After Visit Summary for your review. Other Preventive Recommendations:    A preventive eye exam performed by an eye specialist is recommended every 1-2 years to screen for glaucoma; cataracts, macular degeneration, and other eye disorders. A preventive dental visit is recommended every 6 months. Try to get at least 150 minutes of exercise per week or 10,000 steps per day on a pedometer . Order or download the FREE \"Exercise & Physical Activity: Your Everyday Guide\" from [employer]. Call 7-130.943.4297 or search [employer] online. You need 5893-9945 mg of calcium and 2659-0396 IU of vitamin D per day. It is possible to meet your calcium requirement with diet alone, but a vitamin D supplement is usually necessary to meet this goal.  When exposed to the sun, use a sunscreen that protects against both UVA and UVB radiation with an SPF of 30 or greater. Reapply every 2 to 3 hours or after sweating, drying off with a towel, or swimming. Always wear a seat belt when traveling in a car. Always wear a helmet when riding a bicycle or motorcycle.

## 2022-11-09 ENCOUNTER — INITIAL CONSULT (OUTPATIENT)
Dept: SURGERY | Age: 67
End: 2022-11-09
Payer: MEDICARE

## 2022-11-09 VITALS
DIASTOLIC BLOOD PRESSURE: 72 MMHG | HEIGHT: 65 IN | BODY MASS INDEX: 35.49 KG/M2 | HEART RATE: 74 BPM | SYSTOLIC BLOOD PRESSURE: 106 MMHG | WEIGHT: 213 LBS

## 2022-11-09 DIAGNOSIS — K21.9 GASTROESOPHAGEAL REFLUX DISEASE, UNSPECIFIED WHETHER ESOPHAGITIS PRESENT: Primary | ICD-10-CM

## 2022-11-09 DIAGNOSIS — K62.5 RECTAL BLEEDING: ICD-10-CM

## 2022-11-09 DIAGNOSIS — E66.01 SEVERE OBESITY (BMI 35.0-39.9) WITH COMORBIDITY (HCC): ICD-10-CM

## 2022-11-09 PROCEDURE — 99203 OFFICE O/P NEW LOW 30 MIN: CPT | Performed by: SURGERY

## 2022-11-09 PROCEDURE — 99215 OFFICE O/P EST HI 40 MIN: CPT | Performed by: SURGERY

## 2022-11-09 PROCEDURE — 1123F ACP DISCUSS/DSCN MKR DOCD: CPT | Performed by: SURGERY

## 2022-11-09 RX ORDER — POLYETHYLENE GLYCOL 3350, SODIUM SULFATE ANHYDROUS, SODIUM BICARBONATE, SODIUM CHLORIDE, POTASSIUM CHLORIDE 236; 22.74; 6.74; 5.86; 2.97 G/4L; G/4L; G/4L; G/4L; G/4L
4 POWDER, FOR SOLUTION ORAL ONCE
Qty: 4000 ML | Refills: 0 | Status: SHIPPED | OUTPATIENT
Start: 2022-11-09 | End: 2022-11-09

## 2022-11-09 RX ORDER — BISACODYL 5 MG
TABLET, DELAYED RELEASE (ENTERIC COATED) ORAL
Qty: 2 TABLET | Refills: 0 | Status: SHIPPED | OUTPATIENT
Start: 2022-11-09

## 2022-11-09 ASSESSMENT — ENCOUNTER SYMPTOMS
ABDOMINAL DISTENTION: 0
WHEEZING: 0
NAUSEA: 1
VOICE CHANGE: 0
ABDOMINAL PAIN: 0
HEARTBURN: 1
SORE THROAT: 0
CONSTIPATION: 1
CHOKING: 0
VOMITING: 0
TROUBLE SWALLOWING: 0
ANAL BLEEDING: 1
EYES NEGATIVE: 1
BACK PAIN: 1
SINUS PRESSURE: 1
HOARSE VOICE: 0
COUGH: 1
BELCHING: 0
SINUS PAIN: 0
WATER BRASH: 1
DIARRHEA: 0

## 2022-11-14 ENCOUNTER — HOSPITAL ENCOUNTER (OUTPATIENT)
Dept: LAB | Age: 67
Discharge: HOME | End: 2022-11-14
Payer: MEDICARE

## 2022-11-14 DIAGNOSIS — E66.01 SEVERE OBESITY (BMI 35.0-39.9) WITH COMORBIDITY (HCC): ICD-10-CM

## 2022-11-14 DIAGNOSIS — K62.5 RECTAL BLEEDING: ICD-10-CM

## 2022-11-14 DIAGNOSIS — K21.9 GASTROESOPHAGEAL REFLUX DISEASE, UNSPECIFIED WHETHER ESOPHAGITIS PRESENT: ICD-10-CM

## 2022-11-14 LAB
ABSOLUTE EOS #: 0.28 K/UL (ref 0–0.44)
ABSOLUTE IMMATURE GRANULOCYTE: <0.03 K/UL (ref 0–0.3)
ABSOLUTE LYMPH #: 1.68 K/UL (ref 1.1–3.7)
ABSOLUTE MONO #: 0.5 K/UL (ref 0.1–1.2)
ALBUMIN SERPL-MCNC: 4.2 G/DL (ref 3.5–5.2)
ALBUMIN/GLOBULIN RATIO: 1.6 (ref 1–2.5)
ALP BLD-CCNC: 64 U/L (ref 35–104)
ALT SERPL-CCNC: 15 U/L (ref 5–33)
ANION GAP SERPL CALCULATED.3IONS-SCNC: 7 MMOL/L (ref 9–17)
AST SERPL-CCNC: 18 U/L
BASOPHILS # BLD: 0 % (ref 0–2)
BASOPHILS ABSOLUTE: <0.03 K/UL (ref 0–0.2)
BILIRUB SERPL-MCNC: 0.2 MG/DL (ref 0.3–1.2)
BUN BLDV-MCNC: 9 MG/DL (ref 8–23)
BUN/CREAT BLD: 13 (ref 9–20)
CALCIUM SERPL-MCNC: 9.9 MG/DL (ref 8.6–10.4)
CHLORIDE BLD-SCNC: 106 MMOL/L (ref 98–107)
CO2: 30 MMOL/L (ref 20–31)
CREAT SERPL-MCNC: 0.71 MG/DL (ref 0.5–0.9)
EOSINOPHILS RELATIVE PERCENT: 6 % (ref 1–4)
GFR SERPL CREATININE-BSD FRML MDRD: >60 ML/MIN/1.73M2
GLUCOSE BLD-MCNC: 104 MG/DL (ref 70–99)
HCT VFR BLD CALC: 34.6 % (ref 36.3–47.1)
HEMOGLOBIN: 10.9 G/DL (ref 11.9–15.1)
IMMATURE GRANULOCYTES: 0 %
LYMPHOCYTES # BLD: 33 % (ref 24–43)
MCH RBC QN AUTO: 26.2 PG (ref 25.2–33.5)
MCHC RBC AUTO-ENTMCNC: 31.5 G/DL (ref 25.2–33.5)
MCV RBC AUTO: 83.2 FL (ref 82.6–102.9)
MONOCYTES # BLD: 10 % (ref 3–12)
PDW BLD-RTO: 16.7 % (ref 11.8–14.4)
PLATELET # BLD: 261 K/UL (ref 138–453)
PMV BLD AUTO: 10.1 FL (ref 8.1–13.5)
POTASSIUM SERPL-SCNC: 4.2 MMOL/L (ref 3.7–5.3)
RBC # BLD: 4.16 M/UL (ref 3.95–5.11)
RBC # BLD: ABNORMAL 10*6/UL
SEG NEUTROPHILS: 52 % (ref 36–65)
SEGMENTED NEUTROPHILS ABSOLUTE COUNT: 2.64 K/UL (ref 1.5–8.1)
SODIUM BLD-SCNC: 143 MMOL/L (ref 135–144)
TOTAL PROTEIN: 6.8 G/DL (ref 6.4–8.3)
WBC # BLD: 5.1 K/UL (ref 3.5–11.3)

## 2022-11-14 PROCEDURE — 85025 COMPLETE CBC W/AUTO DIFF WBC: CPT

## 2022-11-14 PROCEDURE — 36415 COLL VENOUS BLD VENIPUNCTURE: CPT

## 2022-11-14 PROCEDURE — 80053 COMPREHEN METABOLIC PANEL: CPT

## 2022-12-06 PROBLEM — K62.5 RECTAL BLEEDING: Status: ACTIVE | Noted: 2022-12-06

## 2022-12-06 ASSESSMENT — ENCOUNTER SYMPTOMS
HEARTBURN: 1
BACK PAIN: 1
DIARRHEA: 0
SINUS PRESSURE: 1
SORE THROAT: 0
WHEEZING: 0
TROUBLE SWALLOWING: 0
HOARSE VOICE: 0
ABDOMINAL DISTENTION: 0
VOICE CHANGE: 0
BELCHING: 0
SINUS PAIN: 0
COUGH: 1
ABDOMINAL PAIN: 0
ANAL BLEEDING: 1
VOMITING: 0
CHOKING: 0
CONSTIPATION: 1
EYES NEGATIVE: 1
WATER BRASH: 1
NAUSEA: 1

## 2022-12-06 NOTE — H&P
Rectal bleeding sporadically for the past few months. She has a history of hemorrhoids treated with rubber banding in 2016. She does feel prolapse when she has to strain. Bowel have not changed otherwise. No abdominal pain or weight loss. Has been almost 10 years since her last colonoscopy. Also has a family history of colon cancer. Gastroesophageal Reflux  She complains of coughing, early satiety, heartburn, nausea and water brash. She reports no abdominal pain, no belching, no chest pain, no choking, no dysphagia, no hoarse voice, no sore throat or no wheezing. This is a chronic problem. The current episode started more than 1 year ago. The problem occurs frequently (Several times a week). The problem has been rapidly worsening. The heartburn duration is more than one hour. The heartburn is located in the substernum. The heartburn is of moderate intensity. The heartburn does not wake her from sleep. The heartburn does not limit her activity. The heartburn changes with position. The symptoms are aggravated by certain foods and lying down. Associated symptoms include fatigue. Pertinent negatives include no anemia, melena, muscle weakness or weight loss. Risk factors include obesity and caffeine use. She has tried a PPI, an herbal remedy and an antacid for the symptoms. The treatment provided moderate relief. Past procedures do not include an abdominal ultrasound, an EGD, esophageal manometry, esophageal pH monitoring, H. pylori antibody titer or a UGI. Tea drinker - ice tea - one bottle a day.  1 Dr. Savanna Neil a day.     Past Medical History:   Diagnosis Date    Breast cancer [facility])     Cancer of right female breast ([facility] [address]     infiltrating ductal carcinoma T10 N0 M0 ER positive    Cataract of left eye     extraction 3/13/18    Cataract of right eye     extraction 2/15/18    Depression     GERD (gastroesophageal reflux disease)     Glaucoma     Dr Rasta Velazquez    History of gastroenteritis 1994    Hospitalized for gastroenteritis and dehydration. History of therapeutic radiation     Hyperlipidemia     Hypertension     Controlled with diet and exercise. OA (osteoarthritis)     Seasonal allergies      Past Surgical History:   Procedure Laterality Date    BREAST BIOPSY Right     BREAST LUMPECTOMY Right     BREAST SURGERY Right 12/14/2015    lumpectomy with sentinal node biopsy    BUNIONECTOMY      Remote. COLONOSCOPY  1/28/2013    Grade 1 internal hemorrhoids. Otherwise normal.    DILATION AND CURETTAGE OF UTERUS  12/29/2005    Hysteroscopy. HEMORRHOID SURGERY      rubber banding by Dr. Maye Coffman 10/28/16    INTRACAPSULAR CATARACT EXTRACTION Right 02/15/2018    Dr Andrews Pollen EXTRACTION Left 03/13/2018    Dr Lyla Jaquez ARTHROSCOPY Right 7/26/2002    Partial medial meniscectomy. MENISCECTOMY Right 7/2002    Medial.    [place] SONO GUIDE NEEDLE BIOPSY Right 11/20/2015    Right breast    TOE OSTEOTOMY Right 2009    Metatarsal osteotomy of right 1st digit. TOTAL KNEE ARTHROPLASTY Left 11/9/2011    TOTAL KNEE ARTHROPLASTY Right 12/3/2010    UPPER GASTROINTESTINAL ENDOSCOPY  2/2004     No current facility-administered medications for this encounter. Current Outpatient Medications   Medication Sig Dispense Refill    bisacodyl 5 MG EC tablet Take two tabs by mouth at noon the day prior to your procedure 2 tablet 0    dorzolamide-timolol (COSOPT) 22.3-6.8 MG/ML ophthalmic solution Apply 1 drop to eye 2 times daily      hydrocortisone 2.5 % cream Apply topically 2 times daily Apply topically 2 times daily.       brimonidine (ALPHAGAN) 0.2 % ophthalmic solution in the morning and at bedtime      fenofibrate (TRICOR) 145 MG tablet TAKE 1 TABLET DAILY 90 tablet 2    atorvastatin (LIPITOR) 20 MG tablet Take 1 tablet by mouth daily 90 tablet 3    PARoxetine (PAXIL) 20 MG tablet Take 1 tablet by mouth daily 90 tablet 3    Multiple Vitamins-Minerals (ICAPS AREDS 2 PO) Take 2 capsules by mouth daily latanoprost (XALATAN) 0.005 % ophthalmic solution Place 1 drop into both eyes nightly      vitamin D (CHOLECALCIFEROL) 125 MCG (5000 UT) CAPS capsule Take 5,000 Units by mouth daily      Multiple Vitamin (MULTI-VITAMIN PO) Take 1 tablet by mouth daily      calcium citrate-vitamin D (CITRACAL+D) 315-200 MG-UNIT per tablet Take 1 tablet by mouth      omeprazole (PRILOSEC) 20 MG capsule Take 1 capsule by mouth daily. 90 capsule 1    cetirizine (ZYRTEC) 10 MG tablet Take 10 mg by mouth daily. No Known Allergies  Social History     Tobacco Use    Smoking status: Never    Smokeless tobacco: Never   Vaping Use    Vaping Use: Never used   Substance Use Topics    Alcohol use: No     Alcohol/week: 0.0 standard drinks    Drug use: No     Family History   Problem Relation Age of Onset    Dementia Mother     Breast Cancer Sister     Breast Cancer Sister     No Known Problems Sister     Colon Cancer Sister         colon cancer    No Known Problems Sister     No Known Problems Maternal Aunt     No Known Problems Maternal Aunt     No Known Problems Maternal Aunt     No Known Problems Maternal Aunt     No Known Problems Maternal Aunt     No Known Problems Maternal Aunt       Review of Systems   Constitutional:  Positive for activity change, diaphoresis and fatigue. Negative for appetite change, fever, unexpected weight change and weight loss. HENT:  Positive for sinus pressure. Negative for dental problem, hoarse voice, sinus pain, sore throat, trouble swallowing and voice change. Eyes: Negative. Respiratory:  Positive for cough. Negative for choking and wheezing. Cardiovascular:  Negative for chest pain. Gastrointestinal:  Positive for anal bleeding, constipation, heartburn and nausea. Negative for abdominal distention, abdominal pain, diarrhea, dysphagia, melena and vomiting. Genitourinary:  Negative for difficulty urinating, dysuria, frequency, pelvic pain, vaginal bleeding and vaginal discharge. Musculoskeletal:  Positive for arthralgias, back pain and neck pain. Negative for muscle weakness. Skin:  Negative for rash and wound. Neurological:  Negative for dizziness, tremors, seizures, syncope, facial asymmetry, speech difficulty, weakness, light-headedness and headaches. Hematological:  Does not bruise/bleed easily. Psychiatric/Behavioral:  Negative for agitation, behavioral problems, confusion, decreased concentration and dysphoric mood. The patient is not nervous/anxious. There were no vitals taken for this visit. Physical Exam  Constitutional:       Appearance: She is well-developed. She is obese. HENT:      Head: Normocephalic and atraumatic. Eyes:      General: No scleral icterus. Extraocular Movements: Extraocular movements intact. Conjunctiva/sclera: Conjunctivae normal.      Pupils: Pupils are equal, round, and reactive to light. Cardiovascular:      Rate and Rhythm: Normal rate and regular rhythm. Pulses: Normal pulses. Heart sounds: Normal heart sounds. Pulmonary:      Effort: Pulmonary effort is normal.      Breath sounds: Normal breath sounds. Abdominal:      General: Abdomen is flat. Bowel sounds are normal. There is no distension. Palpations: Abdomen is soft. There is no mass. Tenderness: There is no abdominal tenderness. There is no guarding or rebound. Hernia: No hernia is present. Musculoskeletal:      Cervical back: Normal range of motion and neck supple. No rigidity or tenderness. Lymphadenopathy:      Cervical: No cervical adenopathy. Skin:     General: Skin is warm and dry. Coloration: Skin is not jaundiced or pale. Findings: No bruising or erythema. Neurological:      General: No focal deficit present. Mental Status: She is alert and oriented to person, place, and time. Psychiatric:         Behavior: Behavior is cooperative.       IMP/PLAN  1) GERD  2) Rectal Bleeding    Risks and benefits of colonoscopy and EGD (upper G.I. Endoscopy) were discussed with Jennifer Vernon. In particular I discussed the nature and limitations of the procedures, the possibility of incomplete colonoscopy and failure to make a diagnosis with either procedure. I also discussed the risks and consequences of reactions to the sedatives, bleeding and perforation. Alternate ways of evaluating the colon including barium enema, CT colonography and sigmoidoscopy were discussed, and alternate ways of evaluating the upper g.i tract were also discussed including barium swallow and CT scan were discussed. she  was also given the opportunity to have any questions answered, and encouraged to call the office with additional issues.

## 2022-12-07 ENCOUNTER — HOSPITAL ENCOUNTER (OUTPATIENT)
Age: 67
Setting detail: OUTPATIENT SURGERY
Discharge: HOME | End: 2022-12-07
Attending: SURGERY | Admitting: SURGERY
Payer: MEDICARE

## 2022-12-07 ENCOUNTER — ANESTHESIA (OUTPATIENT)
Dept: OPERATING ROOM | Age: 67
End: 2022-12-07
Payer: MEDICARE

## 2022-12-07 ENCOUNTER — ANESTHESIA EVENT (OUTPATIENT)
Dept: OPERATING ROOM | Age: 67
End: 2022-12-07
Payer: MEDICARE

## 2022-12-07 VITALS
TEMPERATURE: 98 F | OXYGEN SATURATION: 97 % | RESPIRATION RATE: 14 BRPM | BODY MASS INDEX: 34.07 KG/M2 | SYSTOLIC BLOOD PRESSURE: 131 MMHG | HEART RATE: 59 BPM | WEIGHT: 212 LBS | DIASTOLIC BLOOD PRESSURE: 79 MMHG | HEIGHT: 66 IN

## 2022-12-07 DIAGNOSIS — K62.5 RECTAL BLEEDING: ICD-10-CM

## 2022-12-07 DIAGNOSIS — K21.9 GASTROESOPHAGEAL REFLUX DISEASE, UNSPECIFIED WHETHER ESOPHAGITIS PRESENT: ICD-10-CM

## 2022-12-07 PROBLEM — Z80.0 FAMILY HISTORY OF COLON CANCER: Status: ACTIVE | Noted: 2022-12-07

## 2022-12-07 PROCEDURE — 7100000010 HC PHASE II RECOVERY - FIRST 15 MIN: Performed by: SURGERY

## 2022-12-07 PROCEDURE — 88305 TISSUE EXAM BY PATHOLOGIST: CPT

## 2022-12-07 PROCEDURE — 3700000000 HC ANESTHESIA ATTENDED CARE: Performed by: SURGERY

## 2022-12-07 PROCEDURE — 3700000001 HC ADD 15 MINUTES (ANESTHESIA): Performed by: SURGERY

## 2022-12-07 PROCEDURE — 6360000002 HC RX W HCPCS: Performed by: NURSE ANESTHETIST, CERTIFIED REGISTERED

## 2022-12-07 PROCEDURE — 3609012400 HC EGD TRANSORAL BIOPSY SINGLE/MULTIPLE: Performed by: SURGERY

## 2022-12-07 PROCEDURE — 3609027000 HC COLONOSCOPY: Performed by: SURGERY

## 2022-12-07 PROCEDURE — 7100000011 HC PHASE II RECOVERY - ADDTL 15 MIN: Performed by: SURGERY

## 2022-12-07 PROCEDURE — 2580000003 HC RX 258: Performed by: SURGERY

## 2022-12-07 PROCEDURE — 2709999900 HC NON-CHARGEABLE SUPPLY: Performed by: SURGERY

## 2022-12-07 RX ORDER — PROPOFOL 10 MG/ML
INJECTION, EMULSION INTRAVENOUS PRN
Status: DISCONTINUED | OUTPATIENT
Start: 2022-12-07 | End: 2022-12-07 | Stop reason: SDUPTHER

## 2022-12-07 RX ORDER — SODIUM CHLORIDE, SODIUM LACTATE, POTASSIUM CHLORIDE, CALCIUM CHLORIDE 600; 310; 30; 20 MG/100ML; MG/100ML; MG/100ML; MG/100ML
INJECTION, SOLUTION INTRAVENOUS CONTINUOUS
Status: DISCONTINUED | OUTPATIENT
Start: 2022-12-07 | End: 2022-12-07 | Stop reason: HOSPADM

## 2022-12-07 RX ADMIN — SODIUM CHLORIDE, POTASSIUM CHLORIDE, SODIUM LACTATE AND CALCIUM CHLORIDE: 600; 310; 30; 20 INJECTION, SOLUTION INTRAVENOUS at 09:27

## 2022-12-07 RX ADMIN — PROPOFOL 500 MG: 10 INJECTION, EMULSION INTRAVENOUS at 09:34

## 2022-12-07 ASSESSMENT — PAIN SCALES - GENERAL
PAINLEVEL_OUTOF10: 0
PAINLEVEL_OUTOF10: 0

## 2022-12-07 ASSESSMENT — PAIN DESCRIPTION - DESCRIPTORS: DESCRIPTORS: ACHING

## 2022-12-07 ASSESSMENT — PAIN - FUNCTIONAL ASSESSMENT: PAIN_FUNCTIONAL_ASSESSMENT: 0-10

## 2022-12-07 NOTE — OP NOTE
Operative Note      Patient: Naima Pineda  YOB: 1955  MRN: [id number]    Date of Procedure: 12/7/2022    Pre-Op Diagnosis: Rectal bleeding [K62.5]  Gastroesophageal reflux disease, unspecified whether esophagitis present [K21.9]    Post-Op Diagnosis:  Possible esophagitis, esophageal polyps, normal colonoscopy with hemorrhoids. Procedure(s):  EGD BIOPSY  COLONOSCOPY    Surgeon(s):  Marcel Law MD    Assistant:   * No surgical staff found *    Anesthesia: General    Estimated Blood Loss (mL): Minimal    Complications: None    Specimens:   ID Type Source Tests Collected by Time Destination   A : ANTRUM BIOPSY Tissue Stomach SURGICAL PATHOLOGY Marcel Law MD 12/7/2022 2118    B : BODY BIOPSY Tissue Stomach SURGICAL PATHOLOGY Marcel Law MD 12/7/2022 7389    C : DISTAL ESOPHAGUS BIOPSY Tissue Esophagus SURGICAL PATHOLOGY Marcel Law MD 12/7/2022 0730    D : MID-ESOPHAGUS BIOPSY Tissue Esophagus SURGICAL PATHOLOGY Marcel Law MD 12/7/2022 0945        Implants:  * No implants in log *      Drains: * No LDAs found *    Patient was brought the endoscopy area and IV sedation was induced by the CRNA. Scope was put her mouth on her esophagus into her stomach and duodenum. 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 Distal esophagus was relatively spared and there might be a little ulceration right at the distal esophagus. There is a small hiatal hernia in the stomach overall looks pretty normal.  Duodenum looks good good well down to the distal duodenum. Scope was brought back to stomach and retroflexed does confirm the presence of a small hiatal hernia. Biopsies were done of the antrum and separate biopsies of the body the stomach, distal esophageal biopsies and then some biopsies of these small polypoid areas in the more proximal esophagus.     Patient is repositioned and colonoscopy was performed. Scope was put in rectum passed proximally. Her bowel prep was good. He had quite a bit of particulate liquid stools to the slightly pasty stool throughout her colon most this can be irrigated and suctioned way without too much difficulty. She had some pretty substantial irritated and external hemorrhoids. Passed toward and words all the way around into the cecum required some abdominal pressure maneuvers to get in the last few inches. Both the ileocecal valve and the appendiceal orifice were visualized. From there the scope was slowly and carefully withdrawn. Did more irrigation and suctioning as necessary on the way out. But if any tumors polyps etc. were seen as the scope was withdrawn. No significant diverticulosis. Use both narrowband imaging and the Endo cuff device of the scope to help facilitate visualization. To flex and scope the rectum she does have some pretty significant internal hemorrhoid or slightly bleeding. Hemorrhoids are likely the source of her ongoing rectal bleeding. For colon cancer screening she should consider doing this again in 5 years with her family history of colon cancer. We will call her if there are with the biopsy results of the EGD have her follow-up in the office if she would like.     Electronically signed by Nadja Kim MD on 12/7/2022 at 10:03 AM

## 2022-12-07 NOTE — ANESTHESIA PRE PROCEDURE
Department of Anesthesiology  Preprocedure Note       Name:  Chandrakant Bradford   Age:  75 y.o.  :  1955                                          MRN:  [id number]         Date:  2022      Surgeon: Teja Kahn):  Julia Pina MD    Procedure: Procedure(s):  EGD  COLONOSCOPY    Medications prior to admission:   Prior to Admission medications    Medication Sig Start Date End Date Taking? Authorizing Provider   dorzolamide-timolol (COSOPT) 22.3-6.8 MG/ML ophthalmic solution Apply 1 drop to eye 2 times daily 22  Historical Provider, MD   hydrocortisone 2.5 % cream Apply topically 2 times daily Apply topically 2 times daily. Historical Provider, MD   brimonidine (ALPHAGAN) 0.2 % ophthalmic solution in the morning and at bedtime 22   Historical Provider, MD   fenofibrate (TRICOR) 145 MG tablet TAKE 1 TABLET DAILY 7/15/22   Dat Pod Black, DO   atorvastatin (LIPITOR) 20 MG tablet Take 1 tablet by mouth daily 22   Dat Pod Black, DO   PARoxetine (PAXIL) 20 MG tablet Take 1 tablet by mouth daily 22   Jefferson Rapp, DO   Multiple Vitamins-Minerals (ICAPS AREDS 2 PO) Take 2 capsules by mouth daily    Historical Provider, MD   latanoprost (XALATAN) 0.005 % ophthalmic solution Place 1 drop into both eyes nightly 21   Historical Provider, MD   vitamin D (CHOLECALCIFEROL) 125 MCG (5000 UT) CAPS capsule Take 5,000 Units by mouth daily    Historical Provider, MD   Multiple Vitamin (MULTI-VITAMIN PO) Take 1 tablet by mouth daily    Historical Provider, MD   calcium citrate-vitamin D (CITRACAL+D) 315-200 MG-UNIT per tablet Take 1 tablet by mouth    Historical Provider, MD   omeprazole (PRILOSEC) 20 MG capsule Take 1 capsule by mouth daily. 13   Connor Gomez MD   cetirizine (ZYRTEC) 10 MG tablet Take 10 mg by mouth daily.     Historical Provider, MD       Current medications:    Current Facility-Administered Medications   Medication Dose Route Frequency Provider Last Rate Last Admin  lactated ringers infusion   IntraVENous Continuous Naveen Vegas  mL/hr at 12/07/22 0927 New Bag at 12/07/22 0927       Allergies:  No Known Allergies    Problem List:    Patient Active Problem List   Diagnosis Code    Hyperlipidemia E78.5    GERD (gastroesophageal reflux disease) K21.9    OA (osteoarthritis) M19.90    Plantar fascial fibromatosis R/L M72.2    Malignant neoplasm of lower-outer quadrant of right female breast ([facility] [address] C50.511    Nuclear sclerotic cataract H25.10    Primary open angle glaucoma H40.1190    Anxiety F41.9    Allergic rhinitis J30.9    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, 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    PCO (posterior capsular opacification), bilateral H26.493    Dry eyes, bilateral H04.123    Rectal bleeding K62.5       Past Medical History:        Diagnosis Date    Breast cancer ([facility] [address]     Cancer of right female breast ([facility] [address]     infiltrating ductal carcinoma T10 N0 M0 ER positive    Cataract of left eye     extraction 3/13/18    Cataract of right eye     extraction 2/15/18    Depression     GERD (gastroesophageal reflux disease)     Glaucoma     Dr Zahida Guerrero History of gastroenteritis 1994    Hospitalized for gastroenteritis and dehydration.  History of therapeutic radiation     Hyperlipidemia     Hypertension     Controlled with diet and exercise.  OA (osteoarthritis)     Seasonal allergies        Past Surgical History:        Procedure Laterality Date    BREAST BIOPSY Right     BREAST LUMPECTOMY Right     BREAST SURGERY Right 12/14/2015    lumpectomy with sentinal node biopsy    BUNIONECTOMY      Remote.  COLONOSCOPY  1/28/2013    Grade 1 internal hemorrhoids. Otherwise normal.    DILATION AND CURETTAGE OF UTERUS  12/29/2005    Hysteroscopy.     HEMORRHOID SURGERY      rubber banding by Dr. Arya Bonilla 10/28/16    INTRACAPSULAR CATARACT EXTRACTION Right 02/15/2018    Dr Rome Weaver Left 03/13/2018    Dr Natali Harmon ARTHROSCOPY Right 7/26/2002    Partial medial meniscectomy.  MENISCECTOMY Right 7/2002    Medial.    [place] SONO GUIDE NEEDLE BIOPSY Right 11/20/2015    Right breast    TOE OSTEOTOMY Right 2009    Metatarsal osteotomy of right 1st digit.  TOTAL KNEE ARTHROPLASTY Left 11/9/2011    TOTAL KNEE ARTHROPLASTY Right 12/3/2010    UPPER GASTROINTESTINAL ENDOSCOPY  2/2004       Social History:    Social History     Tobacco Use    Smoking status: Never    Smokeless tobacco: Never   Substance Use Topics    Alcohol use: No     Alcohol/week: 0.0 standard drinks                                Counseling given: Not Answered      Vital Signs (Current):   Vitals:    12/07/22 0908   BP: 130/71   Pulse: 76   Resp: 16   Temp: 36.8 °C (98.2 °F)   TempSrc: Oral   SpO2: 95%   Weight: 212 lb (96.2 kg)   Height: 5' 6\" (1.676 m)                                              BP Readings from Last 3 Encounters:   12/07/22 130/71   11/09/22 106/72   11/02/22 110/76       NPO Status: Time of last liquid consumption: 2330                        Time of last solid consumption: 2300                        Date of last liquid consumption: 12/06/22                        Date of last solid food consumption: 12/05/22    BMI:   Wt Readings from Last 3 Encounters:   12/07/22 212 lb (96.2 kg)   11/09/22 213 lb (96.6 kg)   11/02/22 214 lb (97.1 kg)     Body mass index is 34.22 kg/m².     CBC:   Lab Results   Component Value Date/Time    WBC 5.1 11/14/2022 09:34 AM    RBC 4.16 11/14/2022 09:34 AM    HGB 10.9 11/14/2022 09:34 AM    HCT 34.6 11/14/2022 09:34 AM    MCV 83.2 11/14/2022 09:34 AM    RDW 16.7 11/14/2022 09:34 AM     11/14/2022 09:34 AM       CMP:   Lab Results   Component Value Date/Time     11/14/2022 09:34 AM    K 4.2 11/14/2022 09:34 AM     11/14/2022 09:34 AM    CO2 30 11/14/2022 09:34 AM    BUN 9 11/14/2022 09:34 AM    CREATININE 0.71 11/14/2022 09:34 AM    GFRAA >60 04/26/2022 11:32 AM    LABGLOM >60 11/14/2022 09:34 AM    GLUCOSE 104 11/14/2022 09:34 AM    PROT 6.8 11/14/2022 09:34 AM    CALCIUM 9.9 11/14/2022 09:34 AM    BILITOT 0.2 11/14/2022 09:34 AM    ALKPHOS 64 11/14/2022 09:34 AM    AST 18 11/14/2022 09:34 AM    ALT 15 11/14/2022 09:34 AM       POC Tests: No results for input(s): POCGLU, POCNA, POCK, POCCL, POCBUN, POCHEMO, POCHCT in the last 72 hours. Coags: No results found for: PROTIME, INR, APTT    HCG (If Applicable): No results found for: PREGTESTUR, PREGSERUM, HCG, HCGQUANT     ABGs: No results found for: PHART, PO2ART, QZH1MTD, BXJ6JSN, BEART, T1ZTSKTS     Type & Screen (If Applicable):  No results found for: LABABO, LABRH    Drug/Infectious Status (If Applicable):  Lab Results   Component Value Date/Time    HEPCAB NONREACTIVE 11/11/2016 02:21 PM       COVID-19 Screening (If Applicable): No results found for: COVID19        Anesthesia Evaluation  Patient summary reviewed and Nursing notes reviewed no history of anesthetic complications:   Airway: Mallampati: II  TM distance: >3 FB   Neck ROM: full  Mouth opening: > = 3 FB   Dental:    (+) partials      Pulmonary:Negative Pulmonary ROS breath sounds clear to auscultation                             Cardiovascular:  Exercise tolerance: good (>4 METS),   (+) hypertension:,     (-)  angina      Rhythm: regular  Rate: normal           Beta Blocker:  Not on Beta Blocker         Neuro/Psych:   (+) depression/anxiety             GI/Hepatic/Renal:   (+) GERD:, bowel prep,           Endo/Other: Negative Endo/Other ROS                    Abdominal:             Vascular: negative vascular ROS. Other Findings:           Anesthesia Plan      general and TIVA     ASA 2       Induction: intravenous. Anesthetic plan and risks discussed with patient.       Plan discussed with surgical team.                    CONSTANCE Macdonald - CRNA   12/7/2022

## 2022-12-07 NOTE — DISCHARGE INSTRUCTIONS
1) You might have some esophagitis. 2) You have some small plaque like polyps in your esophagus, which are not likely clinically significant. 3) You have substantial hemorrhoids  4) With your family history of colon cancer, you should repeat a colonoscopy in 5 years    Discharge Instructions for EGD     An EGD or upper GI endoscopy is a visual exam of the lining of the esophagus, stomach (gastric) and duodenum (the first part of the small intestine). An endoscopy is a flexible tube with a light and a viewing device. It allows the doctor to view the inside of the colon through a tiny video camera. EGD is performed for many reasons: unexplained anemia , pain, bleeding, heart burn, among many other reasons. Complications from a EGD are rare. Some possible serious complications include perforated bowel (which might require surgery) and bleeding (which could require blood transfusion ). Minor complications include bloating, gas, and cramping that can last for 1-2 days after the procedure. Because air is put into your upper GI during the procedure, it is normal to pass large amounts of air from your rectum and burp a lot. What You Will Need   Someone to drive you home after the procedure    Steps to [facility] when you get home. Because the sedative will make you drowsy, don't drive, operate machinery, or make important decisions the day of the procedure. Feelings of bloating, gas, or cramping may persist for 24 hours. Diet    Try sips of water first. If tolerated, resume regular diet or the diet recommended by your physician. Do not drink alcohol for 24 hours. Physical Activity    You may return to work tomorrow. Do not drive, operate heavy machinery, or do activities that require coordination or balance until tomorrow  Otherwise, return to your normal routine as soon as you are comfortable to do so, which is usually the next day after the procedure.     Medications    When taking medications, it's important to: Take your medication as directednot more, not less, not at a different time. Do not stop taking them without consulting your healthcare provider. Don't share them with anyone else. Know what effects and side effects to expect, and report them to your healthcare provider. If you are taking more than one drug, even if it is an over-the-counter medication, herb, or dietary supplement, be sure to check with a physician or pharmacist about drug interactions. Plan ahead for refills so you don't run out. Follow-up   You will be called with your results usually within a week or  We will have you make a follow up appointment if needed  You are always welcome to follow up in person if you have questions or there are other issue you would like addressed      Call Your Doctor If Any of the Following Occurs   Monitor your recovery once you leave the hospital. As soon as you have a problem, alert your doctor. If any of the following occur, call your doctor or come to the emergency room  Bleeding from your rectum; notify your doctor if you pass a teaspoonful or more of blood   Black, tarry stools   Severe abdominal or chest pain pain   Hard, swollen abdomen   Signs of infection, including fever or chills   Inability to pass gas or stool   Coughing, shortness of breath, chest pain, severe nausea or vomiting   In case of an emergency, call 911 immediately. Discharge Instructions for Colonoscopy     Colonoscopy is a visual exam of the lining of the large intestine, also called the bowel or colon, with a colonoscope. A colonoscope is a flexible tube with a light and a viewing device. It allows the doctor to view the inside of the colon through a tiny video camera. Colonoscopy is performed for many reasons: unexplained anemia , pain, diarrhea , bloody stools, cancer screening, among many other reasons. Complications from a colonoscopy are rare.  Some possible serious complications include perforated bowel (which might require surgery) and bleeding (which could require blood transfusion ). Minor complications include bloating, gas, and cramping that can last for 1-2 days after the procedure. Because air is put into your colon during the procedure, it is normal to pass large amounts of air from your rectum. You may not have a bowel movement for 1-3 days after the procedure. What You Will Need   Someone to drive you home after the procedure    Steps to [address] when you get home. Because the sedative will make you drowsy, don't drive, operate machinery, or make important decisions the day of the procedure. Feelings of bloating, gas, or cramping may persist for 24 hours. Diet    Try sips of water first. If tolerated, resume regular diet or the diet recommended by your physician. Do not drink alcohol for 24 hours. Physical Activity    You may return to work tomorrow. Do not drive, operate heavy machinery, or do activities that require coordination or balance until tomorrow  Otherwise, return to your normal routine as soon as you are comfortable to do so, which is usually the next day after the procedure. Medications    When taking medications, it's important to: Take your medication as directednot more, not less, not at a different time. Do not stop taking them without consulting your healthcare provider. Don't share them with anyone else. Know what effects and side effects to expect, and report them to your healthcare provider. If you are taking more than one drug, even if it is an over-the-counter medication, herb, or dietary supplement, be sure to check with a physician or pharmacist about drug interactions. Plan ahead for refills so you don't run out.      Follow-up   You will be called with your results usually within a week or  We will have you make a follow up appointment  You are always welcome to follow up in person if you have questions or there are other issue you would like addressed      Call Your Doctor If Any of the Following Occurs   Monitor your recovery once you leave the hospital. As soon as you have a problem, alert your doctor. If any of the following occur, call your doctor or come to the emergency room  Bleeding from your rectum; notify your doctor if you pass a teaspoonful or more of blood   Black, tarry stools   Severe abdominal pain   Hard, swollen abdomen   Signs of infection, including fever or chills   Inability to pass gas or stool   Coughing, shortness of breath, chest pain, severe nausea or vomiting   In case of an emergency, call 911 immediately. Hemorrhoids: Care Instructions  Overview     Hemorrhoids are swollen veins that develop in the anal canal. Bleeding during bowel movements, itching, and rectal pain are the most common symptoms. Hemorrhoids can be uncomfortable at times, but rarely are they a serious problem. Most of the time, you can treat them with simple changes to your diet and bowel habits. These changes include eating more fiber and not straining to pass stools. Most hemorrhoids don't need surgery or other treatment unless they are very large and painful or bleed a lot. Follow-up care is a key part of your treatment and safety. Be sure to make and go to all appointments, and call your doctor if you are having problems. It's also a good idea to know your test results and keep a list of the medicines you take. How can you care for yourself at home? Sit in a few inches of warm water (sitz bath) 3 times a day and after bowel movements. The warm water helps with pain and itching. Put ice on your anal area several times a day for 10 minutes at a time. Put a thin cloth between the ice and your skin. Follow this by placing a warm, wet towel on the area for another 10 to 20 minutes. Take pain medicines exactly as directed.   If the doctor gave you a prescription medicine for pain, take it as prescribed. If you are not taking a prescription pain medicine, ask your doctor if you can take an over-the-counter medicine. Keep the anal area clean, but be gentle. Use water and a fragrance-free soap, or use baby wipes or medicated pads such as Tucks. Wear cotton underwear and loose clothing to decrease moisture in the anal area. Eat more fiber. Include foods such as whole-grain breads and cereals, raw vegetables, raw and dried fruits, and beans. Drink plenty of fluids. If you have kidney, heart, or liver disease and have to limit fluids, talk with your doctor before you increase the amount of fluids you drink. Use a stool softener that contains bran or psyllium. You can save money by buying bran or psyllium (available in bulk at most health food stores) and sprinkling it on foods or stirring it into fruit juice. Or you can use a product such as Metamucil or Hydrocil. Practice healthy bowel habits. Go to the bathroom as soon as you have the urge. Avoid straining to pass stools. Relax and give yourself time to let things happen naturally. Do not hold your breath while passing stools. Do not read while sitting on the toilet. Get off the toilet as soon as you have finished. Take your medicines exactly as prescribed. Call your doctor if you think you are having a problem with your medicine. When should you call for help? Call 911 anytime you think you may need emergency care. For example, call if:    You pass maroon or very bloody stools. Call your doctor now or seek immediate medical care if:    You have increased pain. You have increased bleeding. Watch closely for changes in your health, and be sure to contact your doctor if:    Your symptoms have not improved after 3 or 4 days. Where can you learn more? Go to [email or web address]://[email or web address].[email or web address]. org and sign in to your [employer] account. Enter W867 in the [employer] box to learn more about \"Hemorrhoids: Care Instructions. \" If you do not have an account, please click on the \"Sign Up Now\" link. Current as of: June 6, 2022               Content Version: 13.4  © 2006-2022 [employer]wise[employer]orporated. Care instructions adapted under license by [facility] ([facility]). If you have questions about a medical condition or this instruction, always ask your healthcare professional. [address] any warranty or liability for your use of this information.

## 2022-12-07 NOTE — ANESTHESIA POSTPROCEDURE EVALUATION
Department of Anesthesiology  Postprocedure Note    Patient: Amara Fitzgerald  MRN: [id number]  YOB: 1955  Date of evaluation: 12/7/2022      Procedure Summary     Date: 12/07/22 Room / Location: [address]    Anesthesia Start: 8145 Anesthesia Stop: 1005    Procedures:       EGD BIOPSY      COLONOSCOPY Diagnosis:       Rectal bleeding      Gastroesophageal reflux disease, unspecified whether esophagitis present      (Rectal bleeding [K62.5])      (Gastroesophageal reflux disease, unspecified whether esophagitis present [K21.9])    Surgeons: Maggy Nelson MD Responsible Provider: Natividad Brennan APRN - CRNA    Anesthesia Type: general, TIVA ASA Status: 2          Anesthesia Type: No value filed.     Clair Phase I: Clair Score: 10    Clair Phase II: Clair Score: 9      Anesthesia Post Evaluation    Patient location during evaluation: PACU  Patient participation: complete - patient participated  Level of consciousness: awake and alert  Pain score: 0  Airway patency: patent  Nausea & Vomiting: no nausea and no vomiting  Complications: no  Cardiovascular status: blood pressure returned to baseline and hemodynamically stable  Respiratory status: acceptable, spontaneous ventilation and room air  Hydration status: euvolemic

## 2022-12-08 LAB — SURGICAL PATHOLOGY REPORT: NORMAL

## 2023-01-30 PROBLEM — K22.70 BARRETT'S ESOPHAGUS WITHOUT DYSPLASIA: Status: ACTIVE | Noted: 2023-01-30

## 2023-03-31 DIAGNOSIS — E78.5 HYPERLIPIDEMIA, UNSPECIFIED HYPERLIPIDEMIA TYPE: ICD-10-CM

## 2023-04-03 RX ORDER — FENOFIBRATE 145 MG/1
145 TABLET, COATED ORAL DAILY
Qty: 90 TABLET | Refills: 3 | Status: SHIPPED | OUTPATIENT
Start: 2023-04-03

## 2023-04-03 NOTE — TELEPHONE ENCOUNTER
Kaila Mejia called requesting a refill of the below medication which has been pended for you:     Requested Prescriptions     Pending Prescriptions Disp Refills    fenofibrate (TRICOR) 145 MG tablet [Pharmacy Med Name: FENOFIBRATE TABS 145MG] 90 tablet 3     Sig: TAKE 1 TABLET DAILY       Last Appointment Date: 11/7/2022  Next Appointment Date: 5/2/2023    No Known Allergies

## 2023-04-25 ENCOUNTER — HOSPITAL ENCOUNTER (OUTPATIENT)
Age: 68
Discharge: HOME | End: 2023-04-25
Payer: MEDICARE

## 2023-04-25 DIAGNOSIS — E55.9 VITAMIN D DEFICIENCY: ICD-10-CM

## 2023-04-25 DIAGNOSIS — E78.5 HYPERLIPIDEMIA, UNSPECIFIED HYPERLIPIDEMIA TYPE: ICD-10-CM

## 2023-04-25 LAB
25(OH)D3 SERPL-MCNC: 32.4 NG/ML
ABSOLUTE EOS #: 0.26 K/UL (ref 0–0.44)
ABSOLUTE IMMATURE GRANULOCYTE: <0.03 K/UL (ref 0–0.3)
ABSOLUTE LYMPH #: 1.63 K/UL (ref 1.1–3.7)
ABSOLUTE MONO #: 0.44 K/UL (ref 0.1–1.2)
ALBUMIN SERPL-MCNC: 4.2 G/DL (ref 3.5–5.2)
ALBUMIN/GLOBULIN RATIO: 1.6 (ref 1–2.5)
ALP SERPL-CCNC: 69 U/L (ref 35–104)
ALT SERPL-CCNC: 15 U/L (ref 5–33)
ANION GAP SERPL CALCULATED.3IONS-SCNC: 9 MMOL/L (ref 9–17)
AST SERPL-CCNC: 19 U/L
BASOPHILS # BLD: 1 % (ref 0–2)
BASOPHILS ABSOLUTE: 0.03 K/UL (ref 0–0.2)
BILIRUB SERPL-MCNC: 0.2 MG/DL (ref 0.3–1.2)
BUN SERPL-MCNC: 13 MG/DL (ref 8–23)
BUN/CREAT BLD: 17 (ref 9–20)
CALCIUM SERPL-MCNC: 9.3 MG/DL (ref 8.6–10.4)
CHLORIDE SERPL-SCNC: 106 MMOL/L (ref 98–107)
CHOLEST SERPL-MCNC: 171 MG/DL
CHOLESTEROL/HDL RATIO: 4.3
CO2 SERPL-SCNC: 28 MMOL/L (ref 20–31)
CREAT SERPL-MCNC: 0.76 MG/DL (ref 0.5–0.9)
EOSINOPHILS RELATIVE PERCENT: 5 % (ref 1–4)
GFR SERPL CREATININE-BSD FRML MDRD: >60 ML/MIN/1.73M2
GLUCOSE SERPL-MCNC: 106 MG/DL (ref 70–99)
HCT VFR BLD AUTO: 38.2 % (ref 36.3–47.1)
HDLC SERPL-MCNC: 40 MG/DL
HGB BLD-MCNC: 11.9 G/DL (ref 11.9–15.1)
IMMATURE GRANULOCYTES: 0 %
LDLC SERPL CALC-MCNC: 101 MG/DL (ref 0–130)
LYMPHOCYTES # BLD: 32 % (ref 24–43)
MCH RBC QN AUTO: 26.4 PG (ref 25.2–33.5)
MCHC RBC AUTO-ENTMCNC: 31.2 G/DL (ref 25.2–33.5)
MCV RBC AUTO: 84.7 FL (ref 82.6–102.9)
MONOCYTES # BLD: 9 % (ref 3–12)
NRBC AUTOMATED: 0 PER 100 WBC
PDW BLD-RTO: 14.1 % (ref 11.8–14.4)
PLATELET # BLD AUTO: 299 K/UL (ref 138–453)
PMV BLD AUTO: 10.3 FL (ref 8.1–13.5)
POTASSIUM SERPL-SCNC: 4.1 MMOL/L (ref 3.7–5.3)
PROT SERPL-MCNC: 6.8 G/DL (ref 6.4–8.3)
RBC # BLD: 4.51 M/UL (ref 3.95–5.11)
SEG NEUTROPHILS: 53 % (ref 36–65)
SEGMENTED NEUTROPHILS ABSOLUTE COUNT: 2.69 K/UL (ref 1.5–8.1)
SODIUM SERPL-SCNC: 143 MMOL/L (ref 135–144)
TRIGL SERPL-MCNC: 150 MG/DL
WBC # BLD AUTO: 5.1 K/UL (ref 3.5–11.3)

## 2023-04-25 PROCEDURE — 80053 COMPREHEN METABOLIC PANEL: CPT

## 2023-04-25 PROCEDURE — 36415 COLL VENOUS BLD VENIPUNCTURE: CPT

## 2023-04-25 PROCEDURE — 82306 VITAMIN D 25 HYDROXY: CPT

## 2023-04-25 PROCEDURE — 80061 LIPID PANEL: CPT

## 2023-04-25 PROCEDURE — 85025 COMPLETE CBC W/AUTO DIFF WBC: CPT

## 2023-05-02 ENCOUNTER — OFFICE VISIT (OUTPATIENT)
Dept: FAMILY MEDICINE CLINIC | Age: 68
End: 2023-05-02
Payer: MEDICARE

## 2023-05-02 VITALS
DIASTOLIC BLOOD PRESSURE: 80 MMHG | TEMPERATURE: 97.8 F | OXYGEN SATURATION: 96 % | WEIGHT: 213 LBS | SYSTOLIC BLOOD PRESSURE: 118 MMHG | HEART RATE: 68 BPM | BODY MASS INDEX: 34.23 KG/M2 | HEIGHT: 66 IN

## 2023-05-02 DIAGNOSIS — K64.9 HEMORRHOIDS, UNSPECIFIED HEMORRHOID TYPE: ICD-10-CM

## 2023-05-02 DIAGNOSIS — E78.5 HYPERLIPIDEMIA, UNSPECIFIED HYPERLIPIDEMIA TYPE: Primary | ICD-10-CM

## 2023-05-02 DIAGNOSIS — R73.01 IMPAIRED FASTING GLUCOSE: ICD-10-CM

## 2023-05-02 DIAGNOSIS — E55.9 VITAMIN D DEFICIENCY: ICD-10-CM

## 2023-05-02 DIAGNOSIS — F32.A DEPRESSION, UNSPECIFIED DEPRESSION TYPE: ICD-10-CM

## 2023-05-02 DIAGNOSIS — C50.511 MALIGNANT NEOPLASM OF LOWER-OUTER QUADRANT OF RIGHT FEMALE BREAST, UNSPECIFIED ESTROGEN RECEPTOR STATUS (HCC): ICD-10-CM

## 2023-05-02 DIAGNOSIS — R20.2 TINGLING: ICD-10-CM

## 2023-05-02 DIAGNOSIS — H40.1130 PRIMARY OPEN ANGLE GLAUCOMA OF BOTH EYES, UNSPECIFIED GLAUCOMA STAGE: ICD-10-CM

## 2023-05-02 PROCEDURE — 99213 OFFICE O/P EST LOW 20 MIN: CPT | Performed by: FAMILY MEDICINE

## 2023-05-02 PROCEDURE — 99214 OFFICE O/P EST MOD 30 MIN: CPT | Performed by: FAMILY MEDICINE

## 2023-05-02 PROCEDURE — 1123F ACP DISCUSS/DSCN MKR DOCD: CPT | Performed by: FAMILY MEDICINE

## 2023-05-02 RX ORDER — ATORVASTATIN CALCIUM 20 MG/1
20 TABLET, FILM COATED ORAL DAILY
Qty: 90 TABLET | Refills: 3 | Status: SHIPPED | OUTPATIENT
Start: 2023-05-02

## 2023-05-02 RX ORDER — PAROXETINE HYDROCHLORIDE 20 MG/1
20 TABLET, FILM COATED ORAL DAILY
Qty: 90 TABLET | Refills: 3 | Status: SHIPPED | OUTPATIENT
Start: 2023-05-02

## 2023-05-02 SDOH — ECONOMIC STABILITY: INCOME INSECURITY: HOW HARD IS IT FOR YOU TO PAY FOR THE VERY BASICS LIKE FOOD, HOUSING, MEDICAL CARE, AND HEATING?: PATIENT DECLINED

## 2023-05-02 SDOH — ECONOMIC STABILITY: FOOD INSECURITY: WITHIN THE PAST 12 MONTHS, YOU WORRIED THAT YOUR FOOD WOULD RUN OUT BEFORE YOU GOT MONEY TO BUY MORE.: PATIENT DECLINED

## 2023-05-02 SDOH — ECONOMIC STABILITY: FOOD INSECURITY: WITHIN THE PAST 12 MONTHS, THE FOOD YOU BOUGHT JUST DIDN'T LAST AND YOU DIDN'T HAVE MONEY TO GET MORE.: PATIENT DECLINED

## 2023-05-02 SDOH — ECONOMIC STABILITY: HOUSING INSECURITY
IN THE LAST 12 MONTHS, WAS THERE A TIME WHEN YOU DID NOT HAVE A STEADY PLACE TO SLEEP OR SLEPT IN A SHELTER (INCLUDING NOW)?: PATIENT REFUSED

## 2023-05-02 ASSESSMENT — PATIENT HEALTH QUESTIONNAIRE - PHQ9
SUM OF ALL RESPONSES TO PHQ QUESTIONS 1-9: 0
3. TROUBLE FALLING OR STAYING ASLEEP: 0
5. POOR APPETITE OR OVEREATING: 0
SUM OF ALL RESPONSES TO PHQ QUESTIONS 1-9: 0
SUM OF ALL RESPONSES TO PHQ QUESTIONS 1-9: 0
1. LITTLE INTEREST OR PLEASURE IN DOING THINGS: 0
SUM OF ALL RESPONSES TO PHQ QUESTIONS 1-9: 0
10. IF YOU CHECKED OFF ANY PROBLEMS, HOW DIFFICULT HAVE THESE PROBLEMS MADE IT FOR YOU TO DO YOUR WORK, TAKE CARE OF THINGS AT HOME, OR GET ALONG WITH OTHER PEOPLE: 0
8. MOVING OR SPEAKING SO SLOWLY THAT OTHER PEOPLE COULD HAVE NOTICED. OR THE OPPOSITE, BEING SO FIGETY OR RESTLESS THAT YOU HAVE BEEN MOVING AROUND A LOT MORE THAN USUAL: 0
2. FEELING DOWN, DEPRESSED OR HOPELESS: 0
6. FEELING BAD ABOUT YOURSELF - OR THAT YOU ARE A FAILURE OR HAVE LET YOURSELF OR YOUR FAMILY DOWN: 0
SUM OF ALL RESPONSES TO PHQ9 QUESTIONS 1 & 2: 0
7. TROUBLE CONCENTRATING ON THINGS, SUCH AS READING THE NEWSPAPER OR WATCHING TELEVISION: 0
4. FEELING TIRED OR HAVING LITTLE ENERGY: 0
9. THOUGHTS THAT YOU WOULD BE BETTER OFF DEAD, OR OF HURTING YOURSELF: 0

## 2023-05-02 ASSESSMENT — ENCOUNTER SYMPTOMS
ABDOMINAL PAIN: 0
BLOOD IN STOOL: 0
SHORTNESS OF BREATH: 0

## 2023-06-30 ENCOUNTER — INITIAL CONSULT (OUTPATIENT)
Dept: SURGERY | Age: 68
End: 2023-06-30
Payer: MEDICARE

## 2023-06-30 VITALS
DIASTOLIC BLOOD PRESSURE: 82 MMHG | HEIGHT: 66 IN | OXYGEN SATURATION: 96 % | WEIGHT: 210.2 LBS | TEMPERATURE: 98.3 F | HEART RATE: 74 BPM | BODY MASS INDEX: 33.78 KG/M2 | SYSTOLIC BLOOD PRESSURE: 124 MMHG

## 2023-06-30 DIAGNOSIS — K64.1 GRADE II HEMORRHOIDS: Primary | ICD-10-CM

## 2023-06-30 PROCEDURE — 99214 OFFICE O/P EST MOD 30 MIN: CPT | Performed by: SURGERY

## 2023-06-30 PROCEDURE — 46221 LIGATION OF HEMORRHOID(S): CPT | Performed by: SURGERY

## 2023-07-11 ENCOUNTER — OFFICE VISIT (OUTPATIENT)
Dept: SURGERY | Age: 68
End: 2023-07-11
Payer: MEDICARE

## 2023-07-11 VITALS
SYSTOLIC BLOOD PRESSURE: 118 MMHG | HEIGHT: 66 IN | BODY MASS INDEX: 33.93 KG/M2 | TEMPERATURE: 98.7 F | OXYGEN SATURATION: 95 % | HEART RATE: 74 BPM | DIASTOLIC BLOOD PRESSURE: 62 MMHG

## 2023-07-11 DIAGNOSIS — K64.1 GRADE II HEMORRHOIDS: Primary | ICD-10-CM

## 2023-07-11 PROCEDURE — 99024 POSTOP FOLLOW-UP VISIT: CPT | Performed by: SURGERY

## 2023-07-11 PROCEDURE — 99212 OFFICE O/P EST SF 10 MIN: CPT | Performed by: SURGERY

## 2023-07-25 ENCOUNTER — OFFICE VISIT (OUTPATIENT)
Dept: SURGERY | Age: 68
End: 2023-07-25
Payer: MEDICARE

## 2023-07-25 VITALS
SYSTOLIC BLOOD PRESSURE: 106 MMHG | TEMPERATURE: 97.3 F | BODY MASS INDEX: 33.93 KG/M2 | HEART RATE: 67 BPM | OXYGEN SATURATION: 96 % | DIASTOLIC BLOOD PRESSURE: 70 MMHG | HEIGHT: 66 IN

## 2023-07-25 DIAGNOSIS — K64.1 GRADE II HEMORRHOIDS: Primary | ICD-10-CM

## 2023-07-25 PROCEDURE — 99024 POSTOP FOLLOW-UP VISIT: CPT | Performed by: SURGERY

## 2023-07-25 PROCEDURE — 99212 OFFICE O/P EST SF 10 MIN: CPT | Performed by: SURGERY

## 2023-08-15 ENCOUNTER — HOSPITAL ENCOUNTER (OUTPATIENT)
Dept: MAMMOGRAPHY | Age: 68
Discharge: HOME | End: 2023-08-17
Payer: MEDICARE

## 2023-08-15 DIAGNOSIS — Z12.31 SCREENING MAMMOGRAM, ENCOUNTER FOR: ICD-10-CM

## 2023-08-15 PROCEDURE — 77063 BREAST TOMOSYNTHESIS BI: CPT

## 2023-08-28 ENCOUNTER — OFFICE VISIT (OUTPATIENT)
Dept: ONCOLOGY | Age: 68
End: 2023-08-28
Payer: MEDICARE

## 2023-08-28 VITALS
SYSTOLIC BLOOD PRESSURE: 104 MMHG | TEMPERATURE: 97.8 F | HEART RATE: 83 BPM | WEIGHT: 214.6 LBS | OXYGEN SATURATION: 97 % | HEIGHT: 66 IN | RESPIRATION RATE: 16 BRPM | DIASTOLIC BLOOD PRESSURE: 60 MMHG | BODY MASS INDEX: 34.49 KG/M2

## 2023-08-28 DIAGNOSIS — Z12.31 SCREENING MAMMOGRAM, ENCOUNTER FOR: Primary | ICD-10-CM

## 2023-08-28 DIAGNOSIS — Z17.0 MALIGNANT NEOPLASM OF LOWER-OUTER QUADRANT OF RIGHT BREAST OF FEMALE, ESTROGEN RECEPTOR POSITIVE (HCC): ICD-10-CM

## 2023-08-28 DIAGNOSIS — C50.511 MALIGNANT NEOPLASM OF LOWER-OUTER QUADRANT OF RIGHT BREAST OF FEMALE, ESTROGEN RECEPTOR POSITIVE (HCC): ICD-10-CM

## 2023-08-28 DIAGNOSIS — Z79.811 LONG TERM (CURRENT) USE OF AROMATASE INHIBITORS: ICD-10-CM

## 2023-08-28 PROCEDURE — 1123F ACP DISCUSS/DSCN MKR DOCD: CPT | Performed by: INTERNAL MEDICINE

## 2023-08-28 PROCEDURE — 99214 OFFICE O/P EST MOD 30 MIN: CPT | Performed by: INTERNAL MEDICINE

## 2023-08-28 NOTE — PROGRESS NOTES
Patient ID: Les Robles, 1955, [id number], 75 y.o. Diagnosis:   Right-sided breast cancer,T1cN0, status post lumpectomy with sentinel lymph node biopsy in December 2015  Adjuvant radiation therapy completed in February 2016  Started on Femara March 2016  STOpped in January 2021 as her BCI was low and no benefits from extended therapy noted  Currently on annual surveillance  HISTORY OF PRESENT ILLNESS:    Oncologic History: This is a 19-year-old female who was diagnosed with right-sided breast cancer in November 2015 and subsequently had lumpectomy with sentinel lymph node biopsy in December 2015. The pathology was positive for well-differentiated ductal carcinoma measuring 1.6 cm with low-grade ductal carcinoma in situ. There was also atypical lobular hyperplasia and lobular carcinoma in situ noted. Final surgical pathology showed P T1cp N0 disease, ER/PA positive and HER-2/александр negative. Patient completed radiation therapy in February 2016 and started on endocrine therapy with Femara in March 2016. Interval history:  Patient is returning for follow-up visit and to discuss lab results and further recommendations. She also had a recent mammogram which showed no evidence of recurrence. She denies any new lump or mass in her breast.  Denies any new bone pain back pain, no unintentional weight loss drenching night sweats fever chills. During this visit patient's allergy, social, medical, surgical history and medications were reviewed and updated.   No Known Allergies    Current Outpatient Medications   Medication Sig Dispense Refill    atorvastatin (LIPITOR) 20 MG tablet Take 1 tablet by mouth daily 90 tablet 3    PARoxetine (PAXIL) 20 MG tablet Take 1 tablet by mouth daily 90 tablet 3    fenofibrate (TRICOR) 145 MG tablet Take 1 tablet by mouth daily 90 tablet 3    dorzolamide-timolol (COSOPT) 22.3-6.8 MG/ML ophthalmic solution Place 1 drop into both eyes in the morning, at noon,

## 2023-11-29 ENCOUNTER — OFFICE VISIT (OUTPATIENT)
Dept: FAMILY MEDICINE CLINIC | Age: 68
End: 2023-11-29
Payer: MEDICARE

## 2023-11-29 VITALS
WEIGHT: 214.2 LBS | BODY MASS INDEX: 34.42 KG/M2 | TEMPERATURE: 97.3 F | RESPIRATION RATE: 14 BRPM | DIASTOLIC BLOOD PRESSURE: 70 MMHG | SYSTOLIC BLOOD PRESSURE: 116 MMHG | OXYGEN SATURATION: 96 % | HEIGHT: 66 IN | HEART RATE: 72 BPM

## 2023-11-29 DIAGNOSIS — R14.1 ABDOMINAL GAS PAIN: ICD-10-CM

## 2023-11-29 DIAGNOSIS — M62.838 TRAPEZIUS MUSCLE SPASM: ICD-10-CM

## 2023-11-29 DIAGNOSIS — K64.9 HEMORRHOIDS, UNSPECIFIED HEMORRHOID TYPE: Primary | ICD-10-CM

## 2023-11-29 DIAGNOSIS — E78.5 HYPERLIPIDEMIA, UNSPECIFIED HYPERLIPIDEMIA TYPE: ICD-10-CM

## 2023-11-29 PROCEDURE — 1123F ACP DISCUSS/DSCN MKR DOCD: CPT | Performed by: FAMILY MEDICINE

## 2023-11-29 PROCEDURE — 99213 OFFICE O/P EST LOW 20 MIN: CPT

## 2023-11-29 PROCEDURE — 99214 OFFICE O/P EST MOD 30 MIN: CPT | Performed by: FAMILY MEDICINE

## 2023-11-29 NOTE — PROGRESS NOTES
Family History   Problem Relation Age of Onset    Dementia Mother     Arthritis Mother     Arthritis Father     Breast Cancer Sister     Breast Cancer Sister     Osteoporosis Sister     Stroke Sister     No Known Problems Sister     Colon Cancer Sister         colon cancer    Cancer Sister     No Known Problems Sister     No Known Problems Maternal Aunt     No Known Problems Maternal Aunt     No Known Problems Maternal Aunt     No Known Problems Maternal Aunt     No Known Problems Maternal Aunt     No Known Problems Maternal Aunt      Social History     Tobacco Use    Smoking status: Never    Smokeless tobacco: Never    Tobacco comments:     grew up with a heavy smoker (dad) in house   Substance Use Topics    Alcohol use: No      Current Outpatient Medications   Medication Sig Dispense Refill    METAMUCIL FIBER PO Take by mouth      atorvastatin (LIPITOR) 20 MG tablet Take 1 tablet by mouth daily 90 tablet 3    PARoxetine (PAXIL) 20 MG tablet Take 1 tablet by mouth daily 90 tablet 3    fenofibrate (TRICOR) 145 MG tablet Take 1 tablet by mouth daily 90 tablet 3    hydrocortisone 2.5 % cream Apply 1 Dose topically 2 times daily Apply topically 2 times daily. brimonidine (ALPHAGAN) 0.2 % ophthalmic solution in the morning and at bedtime      Multiple Vitamins-Minerals (ICAPS AREDS 2 PO) Take 2 capsules by mouth daily      latanoprost (XALATAN) 0.005 % ophthalmic solution Place 1 drop into both eyes nightly      vitamin D (CHOLECALCIFEROL) 125 MCG (5000 UT) CAPS capsule Take 1 capsule by mouth daily      Multiple Vitamin (MULTI-VITAMIN PO) Take 1 tablet by mouth daily      calcium citrate-vitamin D (CITRACAL+D) 315-200 MG-UNIT per tablet Take 1 tablet by mouth      omeprazole (PRILOSEC) 20 MG capsule Take 1 capsule by mouth daily. 90 capsule 1    cetirizine (ZYRTEC) 10 MG tablet Take 1 tablet by mouth daily       No current facility-administered medications for this visit.      No Known Allergies    Health

## 2023-12-06 ENCOUNTER — TELEMEDICINE (OUTPATIENT)
Dept: FAMILY MEDICINE CLINIC | Age: 68
End: 2023-12-06
Payer: MEDICARE

## 2023-12-06 DIAGNOSIS — Z00.00 MEDICARE ANNUAL WELLNESS VISIT, SUBSEQUENT: Primary | ICD-10-CM

## 2023-12-06 PROCEDURE — G0439 PPPS, SUBSEQ VISIT: HCPCS | Performed by: FAMILY MEDICINE

## 2023-12-06 PROCEDURE — 1123F ACP DISCUSS/DSCN MKR DOCD: CPT | Performed by: FAMILY MEDICINE

## 2023-12-06 ASSESSMENT — PATIENT HEALTH QUESTIONNAIRE - PHQ9
2. FEELING DOWN, DEPRESSED OR HOPELESS: 1
6. FEELING BAD ABOUT YOURSELF - OR THAT YOU ARE A FAILURE OR HAVE LET YOURSELF OR YOUR FAMILY DOWN: 0
SUM OF ALL RESPONSES TO PHQ9 QUESTIONS 1 & 2: 1
9. THOUGHTS THAT YOU WOULD BE BETTER OFF DEAD, OR OF HURTING YOURSELF: 0
7. TROUBLE CONCENTRATING ON THINGS, SUCH AS READING THE NEWSPAPER OR WATCHING TELEVISION: 0
4. FEELING TIRED OR HAVING LITTLE ENERGY: 1
SUM OF ALL RESPONSES TO PHQ QUESTIONS 1-9: 4
3. TROUBLE FALLING OR STAYING ASLEEP: 1
5. POOR APPETITE OR OVEREATING: 1
SUM OF ALL RESPONSES TO PHQ QUESTIONS 1-9: 4
1. LITTLE INTEREST OR PLEASURE IN DOING THINGS: 0
8. MOVING OR SPEAKING SO SLOWLY THAT OTHER PEOPLE COULD HAVE NOTICED. OR THE OPPOSITE, BEING SO FIGETY OR RESTLESS THAT YOU HAVE BEEN MOVING AROUND A LOT MORE THAN USUAL: 0
10. IF YOU CHECKED OFF ANY PROBLEMS, HOW DIFFICULT HAVE THESE PROBLEMS MADE IT FOR YOU TO DO YOUR WORK, TAKE CARE OF THINGS AT HOME, OR GET ALONG WITH OTHER PEOPLE: 0

## 2023-12-06 ASSESSMENT — LIFESTYLE VARIABLES
HOW OFTEN DO YOU HAVE A DRINK CONTAINING ALCOHOL: NEVER
HOW MANY STANDARD DRINKS CONTAINING ALCOHOL DO YOU HAVE ON A TYPICAL DAY: PATIENT DOES NOT DRINK

## 2023-12-06 ASSESSMENT — ENCOUNTER SYMPTOMS: BLOOD IN STOOL: 0

## 2023-12-06 NOTE — PROGRESS NOTES
Medicare Annual Wellness Visit    Meeta Ashford is here for Medicare AWV (/)    Assessment & Plan     Recommendations for Preventive Services Due: see orders and patient instructions/AVS.  Recommended screening schedule for the next 5-10 years is provided to the patient in written form: see Patient Instructions/AVS.     No follow-ups on file. Subjective       Patient's complete Health Risk Assessment and screening values have been reviewed and are found in Flowsheets. The following problems were reviewed today and where indicated follow up appointments were made and/or referrals ordered. Positive Risk Factor Screenings with Interventions:                 Weight and Activity:  Physical Activity: Inactive (12/6/2023)    Exercise Vital Sign     Days of Exercise per Week: 0 days     Minutes of Exercise per Session: 0 min     On average, how many days per week do you engage in moderate to strenuous exercise (like a brisk walk)?: 0 days  Have you lost any weight without trying in the past 3 months?: No  There is no height or weight on file to calculate BMI. (!) Abnormal    Inactivity Interventions:  See AVS for additional education material  Obesity Interventions:  See AVS for additional education material                               Objective      Patient-Reported Vitals  No data recorded          No Known Allergies  Prior to Visit Medications    Medication Sig Taking? Authorizing Provider   METAMUCIL FIBER PO Take by mouth  ProviderMargie MD   atorvastatin (LIPITOR) 20 MG tablet Take 1 tablet by mouth daily  Annetta العلي DO   PARoxetine (PAXIL) 20 MG tablet Take 1 tablet by mouth daily  Annetta العلي DO   fenofibrate (TRICOR) 145 MG tablet Take 1 tablet by mouth daily  Annetta العلي DO   hydrocortisone 2.5 % cream Apply 1 Dose topically 2 times daily Apply topically 2 times daily.   Provider, MD Margie   brimonidine (ALPHAGAN) 0.2 % ophthalmic solution in the morning and at bedtime

## 2024-04-20 DIAGNOSIS — E78.5 HYPERLIPIDEMIA, UNSPECIFIED HYPERLIPIDEMIA TYPE: ICD-10-CM

## 2024-04-22 NOTE — TELEPHONE ENCOUNTER
Ashleigh called requesting a refill of the below medication which has been pended for you:     Requested Prescriptions     Pending Prescriptions Disp Refills    fenofibrate (TRICOR) 145 MG tablet [Pharmacy Med Name: FENOFIBRATE TABS 145MG] 90 tablet 3     Sig: TAKE 1 TABLET DAILY       Last Appointment Date: 12/6/2023  Next Appointment Date: 5/29/2024    No Known Allergies

## 2024-04-25 RX ORDER — FENOFIBRATE 145 MG/1
145 TABLET, COATED ORAL DAILY
Qty: 90 TABLET | Refills: 3 | Status: SHIPPED | OUTPATIENT
Start: 2024-04-25

## 2024-05-07 ENCOUNTER — OFFICE VISIT (OUTPATIENT)
Dept: PODIATRY | Age: 69
End: 2024-05-07
Payer: MEDICARE

## 2024-05-07 VITALS
HEART RATE: 72 BPM | RESPIRATION RATE: 20 BRPM | BODY MASS INDEX: 34.44 KG/M2 | WEIGHT: 213.4 LBS | DIASTOLIC BLOOD PRESSURE: 70 MMHG | SYSTOLIC BLOOD PRESSURE: 118 MMHG

## 2024-05-07 DIAGNOSIS — M79.672 FOOT PAIN, BILATERAL: ICD-10-CM

## 2024-05-07 DIAGNOSIS — M21.6X2 PRONATION DEFORMITY OF BOTH FEET: ICD-10-CM

## 2024-05-07 DIAGNOSIS — M72.2 PLANTAR FASCIITIS, BILATERAL: Primary | ICD-10-CM

## 2024-05-07 DIAGNOSIS — M21.6X1 PRONATION DEFORMITY OF BOTH FEET: ICD-10-CM

## 2024-05-07 DIAGNOSIS — M79.671 FOOT PAIN, BILATERAL: ICD-10-CM

## 2024-05-07 PROCEDURE — 1123F ACP DISCUSS/DSCN MKR DOCD: CPT | Performed by: PODIATRIST

## 2024-05-07 PROCEDURE — 99214 OFFICE O/P EST MOD 30 MIN: CPT | Performed by: PODIATRIST

## 2024-05-07 PROCEDURE — 99203 OFFICE O/P NEW LOW 30 MIN: CPT | Performed by: PODIATRIST

## 2024-05-07 RX ORDER — DORZOLAMIDE HYDROCHLORIDE AND TIMOLOL MALEATE 20; 5 MG/ML; MG/ML
SOLUTION/ DROPS OPHTHALMIC
COMMUNITY
Start: 2024-02-05

## 2024-05-07 NOTE — PATIENT INSTRUCTIONS
: Custom fit Orthotics    90127:  Mold for Custom fit orthotics       Diagnosis Codes:    Diagnosis Orders   1. Plantar fasciitis, bilateral        2. Foot pain, bilateral        3. Pronation deformity of both feet           Plantar Fasciitis Treatment    1. Stretching - 3 times per day minimum for 5-10 minutes.   Include stretching against a wall or counter with one foot back and heel flat,    Leaning into a step, or using a towel or belt.   Be active and aggressive with your stretching for maximum benefit.    2.  Ice - 3 times per day.  Use a frozen water bottle and roll your foot over it.  Helps to  stretch and massage the area at the same time.    3.  Anti-inflammatory - take OTC or Rx as ordered.    4.  Arch support and supportive shoe gear.   Wear orthotics or prefabricated arch supports at ALL times in supportive  shoegear.  Do NOT go barefoot.                  You may receive a survey that will ask about your recent patient visit experience.   We value your feedback, so we kindly request that you take a few moments to complete our survey. Your valuable insights will help us ensure that we provide top-notch care, and A+ quality to you and your loved ones.     We strive for 5's!    [facility]

## 2024-05-07 NOTE — PROGRESS NOTES
Subjective:    Ashleigh Ny is a 68 y.o. female who presents to the office today complaining of Bilateral arch pain.  Symptoms began  year(s) ago.  Recently flared up while doing yard work.  Patient relates pain is Present upon arising from bed in the morning and after periods of rest and then standing. The pain progresses throughout the day.  Pain is rated 2 out of 10 and is described as mild.  Treatments prior to today's visit include: Custom orthotics over the years have helped a lot.. Currently denies F/C/N/V.   No Known Allergies    Past Medical History:   Diagnosis Date    Cancer of right female breast (HCC)     infiltrating ductal carcinoma T10 N0 M0 ER positive    Cataract of left eye     extraction 3/13/18    Cataract of right eye     extraction 2/15/18    Depression     GERD (gastroesophageal reflux disease)     Glaucoma     Dr Hargrove    History of gastroenteritis 1994    Hospitalized for gastroenteritis and dehydration.    History of therapeutic radiation     Hyperlipidemia     Hypertension     Controlled with diet and exercise.    OA (osteoarthritis)     Seasonal allergies        Prior to Admission medications    Medication Sig Start Date End Date Taking? Authorizing Provider   dorzolamide-timolol (COSOPT) 2-0.5 % ophthalmic solution  2/5/24  Yes ProviderMargie MD   fenofibrate (TRICOR) 145 MG tablet Take 1 tablet by mouth daily 4/25/24  Yes Annetta العلي, DO   METAMUCIL FIBER PO Take by mouth   Yes ProviderMargie MD   atorvastatin (LIPITOR) 20 MG tablet Take 1 tablet by mouth daily 5/2/23  Yes Annetta العلي DO   PARoxetine (PAXIL) 20 MG tablet Take 1 tablet by mouth daily 5/2/23  Yes Annetta العلي DO   hydrocortisone 2.5 % cream Apply 1 Dose topically 2 times daily Apply topically 2 times daily.   Yes ProviderMargie MD   brimonidine (ALPHAGAN) 0.2 % ophthalmic solution in the morning and at bedtime 8/6/22  Yes Margie Barton MD   Multiple Vitamins-Minerals

## 2024-05-21 ENCOUNTER — HOSPITAL ENCOUNTER (OUTPATIENT)
Age: 69
Discharge: HOME | End: 2024-05-21
Payer: MEDICARE

## 2024-05-21 DIAGNOSIS — E78.5 HYPERLIPIDEMIA, UNSPECIFIED HYPERLIPIDEMIA TYPE: ICD-10-CM

## 2024-05-21 DIAGNOSIS — R73.01 IMPAIRED FASTING GLUCOSE: ICD-10-CM

## 2024-05-21 LAB
ALBUMIN SERPL-MCNC: 4.2 G/DL (ref 3.5–5.2)
ALBUMIN/GLOB SERPL: 1.8 {RATIO} (ref 1–2.5)
ALP SERPL-CCNC: 60 U/L (ref 35–104)
ALT SERPL-CCNC: 16 U/L (ref 5–33)
ANION GAP SERPL CALCULATED.3IONS-SCNC: 10 MMOL/L (ref 9–17)
AST SERPL-CCNC: 18 U/L
BILIRUB SERPL-MCNC: 0.3 MG/DL (ref 0.3–1.2)
BUN SERPL-MCNC: 11 MG/DL (ref 8–23)
BUN/CREAT SERPL: 16 (ref 9–20)
CALCIUM SERPL-MCNC: 9.7 MG/DL (ref 8.6–10.4)
CHLORIDE SERPL-SCNC: 105 MMOL/L (ref 98–107)
CHOLEST SERPL-MCNC: 147 MG/DL (ref 0–199)
CHOLESTEROL/HDL RATIO: 4
CO2 SERPL-SCNC: 26 MMOL/L (ref 20–31)
CREAT SERPL-MCNC: 0.7 MG/DL (ref 0.5–0.9)
EST. AVERAGE GLUCOSE BLD GHB EST-MCNC: 111 MG/DL
GFR, ESTIMATED: >90 ML/MIN/1.73M2
GLUCOSE SERPL-MCNC: 107 MG/DL (ref 70–99)
HBA1C MFR BLD: 5.5 % (ref 4–6)
HDLC SERPL-MCNC: 39 MG/DL
LDLC SERPL CALC-MCNC: 78 MG/DL (ref 0–100)
POTASSIUM SERPL-SCNC: 3.7 MMOL/L (ref 3.7–5.3)
PROT SERPL-MCNC: 6.6 G/DL (ref 6.4–8.3)
SODIUM SERPL-SCNC: 141 MMOL/L (ref 135–144)
TRIGL SERPL-MCNC: 148 MG/DL
TSH SERPL DL<=0.05 MIU/L-ACNC: 4.13 UIU/ML (ref 0.3–5)
VLDLC SERPL CALC-MCNC: 30 MG/DL

## 2024-05-21 PROCEDURE — 80061 LIPID PANEL: CPT

## 2024-05-21 PROCEDURE — 83036 HEMOGLOBIN GLYCOSYLATED A1C: CPT

## 2024-05-21 PROCEDURE — 80053 COMPREHEN METABOLIC PANEL: CPT

## 2024-05-21 PROCEDURE — 84443 ASSAY THYROID STIM HORMONE: CPT

## 2024-05-21 PROCEDURE — 36415 COLL VENOUS BLD VENIPUNCTURE: CPT

## 2024-05-29 ENCOUNTER — OFFICE VISIT (OUTPATIENT)
Dept: FAMILY MEDICINE CLINIC | Age: 69
End: 2024-05-29

## 2024-05-29 VITALS
HEIGHT: 66 IN | RESPIRATION RATE: 14 BRPM | DIASTOLIC BLOOD PRESSURE: 70 MMHG | SYSTOLIC BLOOD PRESSURE: 102 MMHG | OXYGEN SATURATION: 95 % | WEIGHT: 212.3 LBS | HEART RATE: 72 BPM | BODY MASS INDEX: 34.12 KG/M2 | TEMPERATURE: 97 F

## 2024-05-29 DIAGNOSIS — E55.9 VITAMIN D DEFICIENCY: ICD-10-CM

## 2024-05-29 DIAGNOSIS — F32.A DEPRESSION, UNSPECIFIED DEPRESSION TYPE: ICD-10-CM

## 2024-05-29 DIAGNOSIS — C50.511 MALIGNANT NEOPLASM OF LOWER-OUTER QUADRANT OF RIGHT BREAST OF FEMALE, ESTROGEN RECEPTOR POSITIVE (HCC): ICD-10-CM

## 2024-05-29 DIAGNOSIS — E78.5 HYPERLIPIDEMIA, UNSPECIFIED HYPERLIPIDEMIA TYPE: Primary | ICD-10-CM

## 2024-05-29 DIAGNOSIS — Z17.0 MALIGNANT NEOPLASM OF LOWER-OUTER QUADRANT OF RIGHT BREAST OF FEMALE, ESTROGEN RECEPTOR POSITIVE (HCC): ICD-10-CM

## 2024-05-29 DIAGNOSIS — K64.9 HEMORRHOIDS, UNSPECIFIED HEMORRHOID TYPE: ICD-10-CM

## 2024-05-29 RX ORDER — PAROXETINE HYDROCHLORIDE 20 MG/1
20 TABLET, FILM COATED ORAL DAILY
Qty: 90 TABLET | Refills: 3 | Status: SHIPPED | OUTPATIENT
Start: 2024-05-29

## 2024-05-29 RX ORDER — ATORVASTATIN CALCIUM 20 MG/1
20 TABLET, FILM COATED ORAL DAILY
Qty: 90 TABLET | Refills: 3 | Status: SHIPPED | OUTPATIENT
Start: 2024-05-29

## 2024-05-29 SDOH — ECONOMIC STABILITY: FOOD INSECURITY: WITHIN THE PAST 12 MONTHS, THE FOOD YOU BOUGHT JUST DIDN'T LAST AND YOU DIDN'T HAVE MONEY TO GET MORE.: NEVER TRUE

## 2024-05-29 SDOH — ECONOMIC STABILITY: INCOME INSECURITY: HOW HARD IS IT FOR YOU TO PAY FOR THE VERY BASICS LIKE FOOD, HOUSING, MEDICAL CARE, AND HEATING?: NOT HARD AT ALL

## 2024-05-29 SDOH — ECONOMIC STABILITY: HOUSING INSECURITY
IN THE LAST 12 MONTHS, WAS THERE A TIME WHEN YOU DID NOT HAVE A STEADY PLACE TO SLEEP OR SLEPT IN A SHELTER (INCLUDING NOW)?: NO

## 2024-05-29 SDOH — ECONOMIC STABILITY: FOOD INSECURITY: WITHIN THE PAST 12 MONTHS, YOU WORRIED THAT YOUR FOOD WOULD RUN OUT BEFORE YOU GOT MONEY TO BUY MORE.: NEVER TRUE

## 2024-05-29 ASSESSMENT — PATIENT HEALTH QUESTIONNAIRE - PHQ9
SUM OF ALL RESPONSES TO PHQ QUESTIONS 1-9: 2
SUM OF ALL RESPONSES TO PHQ QUESTIONS 1-9: 2
3. TROUBLE FALLING OR STAYING ASLEEP: SEVERAL DAYS
4. FEELING TIRED OR HAVING LITTLE ENERGY: SEVERAL DAYS
9. THOUGHTS THAT YOU WOULD BE BETTER OFF DEAD, OR OF HURTING YOURSELF: NOT AT ALL
SUM OF ALL RESPONSES TO PHQ QUESTIONS 1-9: 2
6. FEELING BAD ABOUT YOURSELF - OR THAT YOU ARE A FAILURE OR HAVE LET YOURSELF OR YOUR FAMILY DOWN: NOT AT ALL
SUM OF ALL RESPONSES TO PHQ QUESTIONS 1-9: 2
SUM OF ALL RESPONSES TO PHQ9 QUESTIONS 1 & 2: 0
5. POOR APPETITE OR OVEREATING: NOT AT ALL
10. IF YOU CHECKED OFF ANY PROBLEMS, HOW DIFFICULT HAVE THESE PROBLEMS MADE IT FOR YOU TO DO YOUR WORK, TAKE CARE OF THINGS AT HOME, OR GET ALONG WITH OTHER PEOPLE: NOT DIFFICULT AT ALL
7. TROUBLE CONCENTRATING ON THINGS, SUCH AS READING THE NEWSPAPER OR WATCHING TELEVISION: NOT AT ALL
2. FEELING DOWN, DEPRESSED OR HOPELESS: NOT AT ALL
1. LITTLE INTEREST OR PLEASURE IN DOING THINGS: NOT AT ALL
8. MOVING OR SPEAKING SO SLOWLY THAT OTHER PEOPLE COULD HAVE NOTICED. OR THE OPPOSITE, BEING SO FIGETY OR RESTLESS THAT YOU HAVE BEEN MOVING AROUND A LOT MORE THAN USUAL: NOT AT ALL

## 2024-05-29 NOTE — PROGRESS NOTES
Constitutional:  Unexpected weight change: down 2 lbs since her last OV.   Respiratory:  Negative for shortness of breath.    Cardiovascular:  Negative for chest pain.   Gastrointestinal:  Positive for abdominal distention (infrequent bloating after eating, usually if she eats too big of a meal or something with tomato sauce). Negative for blood in stool.   Genitourinary:  Negative for hematuria.   Musculoskeletal:  Positive for neck pain (R neck - intermittent, improved from previous).   Neurological:  Negative for dizziness.   Psychiatric/Behavioral:  Positive for dysphoric mood (stable). Negative for suicidal ideas.        Objective:     Vitals:    05/29/24 1116   BP: 102/70   Site: Right Upper Arm   Position: Sitting   Pulse: 72   Resp: 14   Temp: 97 °F (36.1 °C)   TempSrc: Temporal   SpO2: 95%   Weight: 96.3 kg (212 lb 4.8 oz)   Height: 1.676 m (5' 6\")     Physical Exam  Vitals and nursing note reviewed.   Constitutional:       General: She is not in acute distress.     Appearance: Normal appearance. She is well-developed.   HENT:      Head: Normocephalic and atraumatic.      Right Ear: External ear normal.      Left Ear: External ear normal.      Nose: Nose normal.      Mouth/Throat:      Mouth: Mucous membranes are moist.      Pharynx: Oropharynx is clear. No posterior oropharyngeal erythema.   Eyes:      Extraocular Movements: Extraocular movements intact.      Conjunctiva/sclera: Conjunctivae normal.      Pupils: Pupils are equal, round, and reactive to light.   Cardiovascular:      Rate and Rhythm: Normal rate and regular rhythm.      Heart sounds: Normal heart sounds.   Pulmonary:      Effort: Pulmonary effort is normal. No respiratory distress.      Breath sounds: Normal breath sounds.   Abdominal:      General: Bowel sounds are normal. There is no distension.      Palpations: Abdomen is soft.      Tenderness: There is no abdominal tenderness.   Musculoskeletal:      Cervical back: Neck supple.

## 2024-06-05 DIAGNOSIS — M72.2 PLANTAR FASCIITIS, BILATERAL: Primary | ICD-10-CM

## 2024-06-05 PROCEDURE — PBSHW: Performed by: PODIATRIST

## 2024-07-13 ENCOUNTER — HOSPITAL ENCOUNTER (OUTPATIENT)
Age: 69
Discharge: HOME | End: 2024-07-13
Payer: MEDICARE

## 2024-07-13 ENCOUNTER — OFFICE VISIT (OUTPATIENT)
Dept: PRIMARY CARE CLINIC | Age: 69
End: 2024-07-13
Payer: MEDICARE

## 2024-07-13 VITALS
OXYGEN SATURATION: 98 % | SYSTOLIC BLOOD PRESSURE: 100 MMHG | BODY MASS INDEX: 33.75 KG/M2 | TEMPERATURE: 98 F | DIASTOLIC BLOOD PRESSURE: 60 MMHG | HEIGHT: 66 IN | HEART RATE: 84 BPM | WEIGHT: 210 LBS | RESPIRATION RATE: 16 BRPM

## 2024-07-13 DIAGNOSIS — R30.0 DYSURIA: ICD-10-CM

## 2024-07-13 DIAGNOSIS — R82.90 CLOUDY URINE: ICD-10-CM

## 2024-07-13 DIAGNOSIS — N30.01 ACUTE CYSTITIS WITH HEMATURIA: Primary | ICD-10-CM

## 2024-07-13 LAB
BACTERIA URNS QL MICRO: ABNORMAL
BILIRUB UR QL STRIP: NEGATIVE
CHARACTER UR: ABNORMAL
CLARITY UR: CLEAR
COLOR UR: YELLOW
EPI CELLS #/AREA URNS HPF: ABNORMAL /HPF (ref 0–5)
GLUCOSE UR STRIP-MCNC: NEGATIVE MG/DL
HGB UR QL STRIP.AUTO: ABNORMAL
KETONES UR STRIP-MCNC: NEGATIVE MG/DL
LEUKOCYTE ESTERASE UR QL STRIP: ABNORMAL
NITRITE UR QL STRIP: POSITIVE
PH UR STRIP: 6 [PH] (ref 5–6)
PROT UR STRIP-MCNC: ABNORMAL MG/DL
RBC #/AREA URNS HPF: ABNORMAL /HPF (ref 0–4)
SP GR UR STRIP: 1.03 (ref 1.01–1.02)
UROBILINOGEN UR STRIP-ACNC: NORMAL EU/DL (ref 0–1)
WBC #/AREA URNS HPF: ABNORMAL /HPF (ref 0–4)

## 2024-07-13 PROCEDURE — 87086 URINE CULTURE/COLONY COUNT: CPT

## 2024-07-13 PROCEDURE — 99212 OFFICE O/P EST SF 10 MIN: CPT | Performed by: NURSE PRACTITIONER

## 2024-07-13 PROCEDURE — 81001 URINALYSIS AUTO W/SCOPE: CPT

## 2024-07-13 RX ORDER — CEPHALEXIN 500 MG/1
500 CAPSULE ORAL 2 TIMES DAILY
Qty: 14 CAPSULE | Refills: 0 | Status: SHIPPED | OUTPATIENT
Start: 2024-07-13 | End: 2024-07-20

## 2024-07-13 ASSESSMENT — ENCOUNTER SYMPTOMS
NAUSEA: 0
COLOR CHANGE: 0
CHEST TIGHTNESS: 0
DIARRHEA: 0
COUGH: 0
CONSTIPATION: 0
VOMITING: 0
ABDOMINAL PAIN: 0
ABDOMINAL DISTENTION: 0
WHEEZING: 0
SHORTNESS OF BREATH: 0

## 2024-07-13 NOTE — PROGRESS NOTES
[facility], [facility]  [facility]  [address]  [facility] [address]  Dept: [phone]  Dept Fax: [phone]    Ashleigh Ny is a 68 y.o. female who presents today for her medical conditions/complaintsas noted below.  Ashleigh Ny is c/o of   Chief Complaint   Patient presents with    Urinary Tract Infection     Dysuria, frequency, occasional incontinence off and on x 2 weeks     HPI:     Patient presents to the walk in clinic for an acute evaluation. Normally a patient of Dr. العلي. Presents for possible UTI with symptoms for at least 2 weeks, maybe even longer. Reports initially would drink some water and symptoms would improve but then return     Dysuria   This is a new problem. The current episode started 1 to 4 weeks ago (x2 weeks). The problem has been gradually worsening. The quality of the pain is described as burning. There has been no fever. Associated symptoms include frequency. Pertinent negatives include no chills, flank pain, hematuria, nausea, urgency or vomiting. She has tried increased fluids for the symptoms.       Hemoglobin A1C (%)   Date Value   05/21/2024 5.5   04/13/2020 5.5             ( goal A1Cis < 7)   No components found for: \"LABMICR\"  No components found for: \"LDLCHOLESTEROL\", \"LDLCALC\"    (goal LDL is <100)   AST (U/L)   Date Value   05/21/2024 18     ALT (U/L)   Date Value   05/21/2024 16     BUN (mg/dL)   Date Value   05/21/2024 11     BP Readings from Last 3 Encounters:   07/13/24 100/60   05/29/24 102/70   05/07/24 118/70          (goal 120/80)    Past Medical History:   Diagnosis Date    Cancer of right female breast (HCC)     infiltrating ductal carcinoma T10 N0 M0 ER positive    Cataract of left eye     extraction 3/13/18    Cataract of right eye     extraction 2/15/18    Depression     GERD (gastroesophageal reflux disease)     Glaucoma

## 2024-07-14 LAB
MICROORGANISM SPEC CULT: ABNORMAL
SPECIMEN DESCRIPTION: ABNORMAL

## 2024-07-15 LAB
MICROORGANISM SPEC CULT: ABNORMAL
SPECIMEN DESCRIPTION: ABNORMAL

## 2024-07-29 ENCOUNTER — PATIENT MESSAGE (OUTPATIENT)
Dept: FAMILY MEDICINE CLINIC | Age: 69
End: 2024-07-29

## 2024-07-29 DIAGNOSIS — R82.90 CLOUDY URINE: ICD-10-CM

## 2024-07-29 DIAGNOSIS — R39.15 URINARY URGENCY: Primary | ICD-10-CM

## 2024-07-29 NOTE — TELEPHONE ENCOUNTER
From: Ashleigh Ny  To: Dr. Annetta العلي  Sent: 7/29/2024 4:29 PM EDT  Subject: uti infection    I am still having some problems with my bladder. I still have to go to the bathroom quickly or have an accident and there is some bloating feeling in my abdomen. Also still have some cloudy urine sometimes. How long does it take for these to go away after my medication? I've been off now for over a week now. I saw urgent care on July 13th.

## 2024-07-30 ENCOUNTER — HOSPITAL ENCOUNTER (OUTPATIENT)
Age: 69
Discharge: HOME | End: 2024-07-30
Payer: MEDICARE

## 2024-07-30 ENCOUNTER — OFFICE VISIT (OUTPATIENT)
Dept: FAMILY MEDICINE CLINIC | Age: 69
End: 2024-07-30
Payer: MEDICARE

## 2024-07-30 VITALS
DIASTOLIC BLOOD PRESSURE: 70 MMHG | BODY MASS INDEX: 34.2 KG/M2 | OXYGEN SATURATION: 96 % | RESPIRATION RATE: 16 BRPM | WEIGHT: 212.8 LBS | HEART RATE: 85 BPM | HEIGHT: 66 IN | TEMPERATURE: 97.6 F | SYSTOLIC BLOOD PRESSURE: 120 MMHG

## 2024-07-30 DIAGNOSIS — R39.15 URINARY URGENCY: ICD-10-CM

## 2024-07-30 DIAGNOSIS — N30.01 ACUTE CYSTITIS WITH HEMATURIA: Primary | ICD-10-CM

## 2024-07-30 DIAGNOSIS — R82.90 CLOUDY URINE: ICD-10-CM

## 2024-07-30 LAB
BACTERIA URNS QL MICRO: ABNORMAL
BILIRUB UR QL STRIP: NEGATIVE
CHARACTER UR: ABNORMAL
CLARITY UR: ABNORMAL
COLOR UR: YELLOW
EPI CELLS #/AREA URNS HPF: ABNORMAL /HPF (ref 0–5)
GLUCOSE UR STRIP-MCNC: NEGATIVE MG/DL
HGB UR QL STRIP.AUTO: ABNORMAL
KETONES UR STRIP-MCNC: NEGATIVE MG/DL
LEUKOCYTE ESTERASE UR QL STRIP: ABNORMAL
MUCOUS THREADS URNS QL MICRO: ABNORMAL
NITRITE UR QL STRIP: NEGATIVE
PH UR STRIP: 6 [PH] (ref 5–6)
PROT UR STRIP-MCNC: NEGATIVE MG/DL
RBC #/AREA URNS HPF: ABNORMAL /HPF (ref 0–4)
SP GR UR STRIP: 1.02 (ref 1.01–1.02)
UROBILINOGEN UR STRIP-ACNC: NORMAL EU/DL (ref 0–1)
WBC #/AREA URNS HPF: ABNORMAL /HPF (ref 0–4)

## 2024-07-30 PROCEDURE — G2211 COMPLEX E/M VISIT ADD ON: HCPCS | Performed by: FAMILY MEDICINE

## 2024-07-30 PROCEDURE — 87086 URINE CULTURE/COLONY COUNT: CPT

## 2024-07-30 PROCEDURE — 81001 URINALYSIS AUTO W/SCOPE: CPT

## 2024-07-30 PROCEDURE — 87088 URINE BACTERIA CULTURE: CPT

## 2024-07-30 PROCEDURE — 1123F ACP DISCUSS/DSCN MKR DOCD: CPT | Performed by: FAMILY MEDICINE

## 2024-07-30 PROCEDURE — 87186 SC STD MICRODIL/AGAR DIL: CPT

## 2024-07-30 PROCEDURE — 99214 OFFICE O/P EST MOD 30 MIN: CPT | Performed by: FAMILY MEDICINE

## 2024-07-30 RX ORDER — CIPROFLOXACIN 500 MG/1
500 TABLET, FILM COATED ORAL 2 TIMES DAILY
Qty: 14 TABLET | Refills: 0 | Status: SHIPPED | OUTPATIENT
Start: 2024-07-30 | End: 2024-08-06

## 2024-08-01 LAB
MICROORGANISM SPEC CULT: ABNORMAL
SERVICE CMNT-IMP: ABNORMAL
SPECIMEN DESCRIPTION: ABNORMAL

## 2024-08-27 ENCOUNTER — HOSPITAL ENCOUNTER (OUTPATIENT)
Dept: MAMMOGRAPHY | Age: 69
Discharge: HOME | End: 2024-08-29
Attending: INTERNAL MEDICINE
Payer: MEDICARE

## 2024-08-27 ENCOUNTER — HOSPITAL ENCOUNTER (OUTPATIENT)
Dept: BONE DENSITY | Age: 69
Discharge: HOME | End: 2024-08-29
Attending: INTERNAL MEDICINE
Payer: MEDICARE

## 2024-08-27 VITALS — WEIGHT: 210 LBS | HEIGHT: 66 IN | BODY MASS INDEX: 33.75 KG/M2

## 2024-08-27 DIAGNOSIS — Z12.31 SCREENING MAMMOGRAM, ENCOUNTER FOR: ICD-10-CM

## 2024-08-27 DIAGNOSIS — Z17.0 MALIGNANT NEOPLASM OF LOWER-OUTER QUADRANT OF RIGHT BREAST OF FEMALE, ESTROGEN RECEPTOR POSITIVE (HCC): ICD-10-CM

## 2024-08-27 DIAGNOSIS — C50.511 MALIGNANT NEOPLASM OF LOWER-OUTER QUADRANT OF RIGHT BREAST OF FEMALE, ESTROGEN RECEPTOR POSITIVE (HCC): ICD-10-CM

## 2024-08-27 DIAGNOSIS — Z79.811 LONG TERM (CURRENT) USE OF AROMATASE INHIBITORS: ICD-10-CM

## 2024-08-27 PROCEDURE — 77063 BREAST TOMOSYNTHESIS BI: CPT

## 2024-08-27 PROCEDURE — 77080 DXA BONE DENSITY AXIAL: CPT

## 2024-09-19 NOTE — PROGRESS NOTES
Rectal bleeding sporadically for the past few months. She has a history of hemorrhoids treated with rubber banding in 2016. She does feel prolapse when she has to strain. Bowel have not changed otherwise. No abdominal pain or weight loss. Has been almost 10 years since her last colonoscopy. Also has a family history of colon cancer. Gastroesophageal Reflux  She complains of coughing, early satiety, heartburn, nausea and water brash. She reports no abdominal pain, no belching, no chest pain, no choking, no dysphagia, no hoarse voice, no sore throat or no wheezing. This is a chronic problem. The current episode started more than 1 year ago. The problem occurs frequently (Several times a week). The problem has been rapidly worsening. The heartburn duration is more than one hour. The heartburn is located in the substernum. The heartburn is of moderate intensity. The heartburn does not wake her from sleep. The heartburn does not limit her activity. The heartburn changes with position. The symptoms are aggravated by certain foods and lying down. Associated symptoms include fatigue. Pertinent negatives include no anemia, melena, muscle weakness or weight loss. Risk factors include obesity and caffeine use. She has tried a PPI, an herbal remedy and an antacid for the symptoms. The treatment provided moderate relief. Past procedures do not include an abdominal ultrasound, an EGD, esophageal manometry, esophageal pH monitoring, H. pylori antibody titer or a UGI. Tea drinker - ice tea - one bottle a day.  1 Dr. Renella Merlin a day.     Past Medical History:   Diagnosis Date    Breast cancer [facility])     Cancer of right female breast ([facility] [address]     infiltrating ductal carcinoma T10 N0 M0 ER positive    Cataract of left eye     extraction 3/13/18    Cataract of right eye     extraction 2/15/18    Depression     GERD (gastroesophageal reflux disease)     Glaucoma     Dr Dueñas [place]    History of gastroenteritis 1994    Hospitalized for gastroenteritis and dehydration. History of therapeutic radiation     Hyperlipidemia     Hypertension     Controlled with diet and exercise. OA (osteoarthritis)     Seasonal allergies      Past Surgical History:   Procedure Laterality Date    BREAST BIOPSY Right     BREAST LUMPECTOMY Right     BREAST SURGERY Right 12/14/2015    lumpectomy with sentinal node biopsy    BUNIONECTOMY      Remote. COLONOSCOPY  1/28/2013    Grade 1 internal hemorrhoids. Otherwise normal.    DILATION AND CURETTAGE OF UTERUS  12/29/2005    Hysteroscopy. HEMORRHOID SURGERY      rubber banding by Dr. Audelia Godoy 10/28/16    INTRACAPSULAR CATARACT EXTRACTION Right 02/15/2018    Dr Darden Hum EXTRACTION Left 03/13/2018    Dr Pricila Willett ARTHROSCOPY Right 7/26/2002    Partial medial meniscectomy. MENISCECTOMY Right 7/2002    Medial.    IA SONO GUIDE NEEDLE BIOPSY Right 11/20/2015    Right breast    TOE OSTEOTOMY Right 2009    Metatarsal osteotomy of right 1st digit. TOTAL KNEE ARTHROPLASTY Left 11/9/2011    TOTAL KNEE ARTHROPLASTY Right 12/3/2010    UPPER GASTROINTESTINAL ENDOSCOPY  2/2004     Current Outpatient Medications   Medication Sig Dispense Refill    dorzolamide-timolol (COSOPT) 22.3-6.8 MG/ML ophthalmic solution Apply 1 drop to eye 2 times daily      hydrocortisone 2.5 % cream Apply topically 2 times daily Apply topically 2 times daily.       brimonidine (ALPHAGAN) 0.2 % ophthalmic solution in the morning and at bedtime      fenofibrate (TRICOR) 145 MG tablet TAKE 1 TABLET DAILY 90 tablet 2    atorvastatin (LIPITOR) 20 MG tablet Take 1 tablet by mouth daily 90 tablet 3    PARoxetine (PAXIL) 20 MG tablet Take 1 tablet by mouth daily 90 tablet 3    Multiple Vitamins-Minerals (ICAPS AREDS 2 PO) Take 2 capsules by mouth daily      latanoprost (XALATAN) 0.005 % ophthalmic solution Place 1 drop into both eyes nightly      vitamin D (CHOLECALCIFEROL) 125 MCG (5000 UT) CAPS capsule Take 5,000 Units by mouth daily      Multiple Vitamin (MULTI-VITAMIN PO) Take 1 tablet by mouth daily      calcium citrate-vitamin D (CITRACAL+D) 315-200 MG-UNIT per tablet Take 1 tablet by mouth      omeprazole (PRILOSEC) 20 MG capsule Take 1 capsule by mouth daily. 90 capsule 1    cetirizine (ZYRTEC) 10 MG tablet Take 10 mg by mouth daily. No current facility-administered medications for this visit. No Known Allergies  Social History     Tobacco Use    Smoking status: Never    Smokeless tobacco: Never   Vaping Use    Vaping Use: Never used   Substance Use Topics    Alcohol use: No     Alcohol/week: 0.0 standard drinks    Drug use: No     Family History   Problem Relation Age of Onset    Dementia Mother     Breast Cancer Sister     Breast Cancer Sister     No Known Problems Sister     Colon Cancer Sister         colon cancer    No Known Problems Sister     No Known Problems Maternal Aunt     No Known Problems Maternal Aunt     No Known Problems Maternal Aunt     No Known Problems Maternal Aunt     No Known Problems Maternal Aunt     No Known Problems Maternal Aunt       Review of Systems   Constitutional:  Positive for activity change, diaphoresis and fatigue. Negative for appetite change, fever, unexpected weight change and weight loss. HENT:  Positive for sinus pressure. Negative for dental problem, hoarse voice, sinus pain, sore throat, trouble swallowing and voice change. Eyes: Negative. Respiratory:  Positive for cough. Negative for choking and wheezing. Cardiovascular:  Negative for chest pain. Gastrointestinal:  Positive for anal bleeding, constipation, heartburn and nausea. Negative for abdominal distention, abdominal pain, diarrhea, dysphagia, melena and vomiting. Genitourinary:  Negative for difficulty urinating, dysuria, frequency, pelvic pain, vaginal bleeding and vaginal discharge. Musculoskeletal:  Positive for arthralgias, back pain and neck pain. Negative for muscle weakness.    Skin: Negative for rash and wound. Neurological:  Negative for dizziness, tremors, seizures, syncope, facial asymmetry, speech difficulty, weakness, light-headedness and headaches. Hematological:  Does not bruise/bleed easily. Psychiatric/Behavioral:  Negative for agitation, behavioral problems, confusion, decreased concentration and dysphoric mood. The patient is not nervous/anxious. /72 (Site: Right Upper Arm, Position: Sitting)   Pulse 74   Ht 5' 5\" (1.651 m)   Wt 213 lb (96.6 kg)   BMI 35.45 kg/m²     Physical Exam  Constitutional:       Appearance: She is well-developed. She is obese. HENT:      Head: Normocephalic and atraumatic. Eyes:      General: No scleral icterus. Extraocular Movements: Extraocular movements intact. Conjunctiva/sclera: Conjunctivae normal.      Pupils: Pupils are equal, round, and reactive to light. Cardiovascular:      Rate and Rhythm: Normal rate and regular rhythm. Pulses: Normal pulses. Heart sounds: Normal heart sounds. Pulmonary:      Effort: Pulmonary effort is normal.      Breath sounds: Normal breath sounds. Abdominal:      General: Abdomen is flat. Bowel sounds are normal. There is no distension. Palpations: Abdomen is soft. There is no mass. Tenderness: There is no abdominal tenderness. There is no guarding or rebound. Hernia: No hernia is present. Musculoskeletal:      Cervical back: Normal range of motion and neck supple. No rigidity or tenderness. Lymphadenopathy:      Cervical: No cervical adenopathy. Skin:     General: Skin is warm and dry. Coloration: Skin is not jaundiced or pale. Findings: No bruising or erythema. Neurological:      General: No focal deficit present. Mental Status: She is alert and oriented to person, place, and time. Psychiatric:         Behavior: Behavior is cooperative.       IMP/PLAN  1) GERD  2) Rectal Bleeding    Risks and benefits of colonoscopy and EGD (upper G.I. Endoscopy) were discussed with Marie Shrestha. In particular I discussed the nature and limitations of the procedures, the possibility of incomplete colonoscopy and failure to make a diagnosis with either procedure. I also discussed the risks and consequences of reactions to the sedatives, bleeding and perforation. Alternate ways of evaluating the colon including barium enema, CT colonography and sigmoidoscopy were discussed, and alternate ways of evaluating the upper g.i tract were also discussed including barium swallow and CT scan were discussed. she  was also given the opportunity to have any questions answered, and encouraged to call the office with additional issues. no

## 2024-09-30 ENCOUNTER — OFFICE VISIT (OUTPATIENT)
Dept: ONCOLOGY | Age: 69
End: 2024-09-30
Payer: MEDICARE

## 2024-09-30 ENCOUNTER — HOSPITAL ENCOUNTER (OUTPATIENT)
Age: 69
Discharge: HOME | End: 2024-09-30
Payer: MEDICARE

## 2024-09-30 VITALS
SYSTOLIC BLOOD PRESSURE: 118 MMHG | HEART RATE: 68 BPM | RESPIRATION RATE: 15 BRPM | HEIGHT: 66 IN | TEMPERATURE: 97.9 F | DIASTOLIC BLOOD PRESSURE: 64 MMHG | OXYGEN SATURATION: 99 % | BODY MASS INDEX: 33.43 KG/M2 | WEIGHT: 208 LBS

## 2024-09-30 DIAGNOSIS — C50.511 MALIGNANT NEOPLASM OF LOWER-OUTER QUADRANT OF RIGHT BREAST OF FEMALE, ESTROGEN RECEPTOR POSITIVE (HCC): ICD-10-CM

## 2024-09-30 DIAGNOSIS — Z17.0 MALIGNANT NEOPLASM OF LOWER-OUTER QUADRANT OF RIGHT BREAST OF FEMALE, ESTROGEN RECEPTOR POSITIVE (HCC): ICD-10-CM

## 2024-09-30 DIAGNOSIS — Z12.31 SCREENING MAMMOGRAM, ENCOUNTER FOR: Primary | ICD-10-CM

## 2024-09-30 LAB
ALBUMIN SERPL-MCNC: 4 G/DL (ref 3.5–5.2)
ALBUMIN/GLOB SERPL: 1.3 {RATIO} (ref 1–2.5)
ALP SERPL-CCNC: 68 U/L (ref 35–104)
ALT SERPL-CCNC: 13 U/L (ref 5–33)
ANION GAP SERPL CALCULATED.3IONS-SCNC: 9 MMOL/L (ref 9–17)
AST SERPL-CCNC: 18 U/L
BASOPHILS # BLD: 0.03 K/UL (ref 0–0.2)
BASOPHILS NFR BLD: 1 % (ref 0–2)
BILIRUB SERPL-MCNC: 0.2 MG/DL (ref 0.3–1.2)
BUN SERPL-MCNC: 15 MG/DL (ref 8–23)
BUN/CREAT SERPL: 19 (ref 9–20)
CALCIUM SERPL-MCNC: 9.1 MG/DL (ref 8.6–10.4)
CHLORIDE SERPL-SCNC: 107 MMOL/L (ref 98–107)
CO2 SERPL-SCNC: 28 MMOL/L (ref 20–31)
CREAT SERPL-MCNC: 0.8 MG/DL (ref 0.5–0.9)
EOSINOPHIL # BLD: 0.28 K/UL (ref 0–0.44)
EOSINOPHILS RELATIVE PERCENT: 5 % (ref 1–4)
ERYTHROCYTE [DISTWIDTH] IN BLOOD BY AUTOMATED COUNT: 14.2 % (ref 11.8–14.4)
GFR, ESTIMATED: 80 ML/MIN/1.73M2
GLUCOSE SERPL-MCNC: 107 MG/DL (ref 70–99)
HCT VFR BLD AUTO: 36 % (ref 36.3–47.1)
HGB BLD-MCNC: 11.4 G/DL (ref 11.9–15.1)
IMM GRANULOCYTES # BLD AUTO: <0.03 K/UL (ref 0–0.3)
IMM GRANULOCYTES NFR BLD: 0 %
LYMPHOCYTES NFR BLD: 1.76 K/UL (ref 1.1–3.7)
LYMPHOCYTES RELATIVE PERCENT: 34 % (ref 24–43)
MCH RBC QN AUTO: 27.5 PG (ref 25.2–33.5)
MCHC RBC AUTO-ENTMCNC: 31.7 G/DL (ref 25.2–33.5)
MCV RBC AUTO: 86.7 FL (ref 82.6–102.9)
MONOCYTES NFR BLD: 0.43 K/UL (ref 0.1–1.2)
MONOCYTES NFR BLD: 8 % (ref 3–12)
NEUTROPHILS NFR BLD: 52 % (ref 36–65)
NEUTS SEG NFR BLD: 2.67 K/UL (ref 1.5–8.1)
NRBC BLD-RTO: 0 PER 100 WBC
PLATELET # BLD AUTO: 362 K/UL (ref 138–453)
PMV BLD AUTO: 9.6 FL (ref 8.1–13.5)
POTASSIUM SERPL-SCNC: 4.1 MMOL/L (ref 3.7–5.3)
PROT SERPL-MCNC: 7 G/DL (ref 6.4–8.3)
RBC # BLD AUTO: 4.15 M/UL (ref 3.95–5.11)
SODIUM SERPL-SCNC: 144 MMOL/L (ref 135–144)
WBC OTHER # BLD: 5.2 K/UL (ref 3.5–11.3)

## 2024-09-30 PROCEDURE — 99214 OFFICE O/P EST MOD 30 MIN: CPT | Performed by: INTERNAL MEDICINE

## 2024-09-30 PROCEDURE — 36415 COLL VENOUS BLD VENIPUNCTURE: CPT

## 2024-09-30 PROCEDURE — 85025 COMPLETE CBC W/AUTO DIFF WBC: CPT

## 2024-09-30 PROCEDURE — 80053 COMPREHEN METABOLIC PANEL: CPT

## 2024-09-30 PROCEDURE — 1123F ACP DISCUSS/DSCN MKR DOCD: CPT | Performed by: INTERNAL MEDICINE

## 2024-10-01 ENCOUNTER — PATIENT MESSAGE (OUTPATIENT)
Dept: FAMILY MEDICINE CLINIC | Age: 69
End: 2024-10-01

## 2024-10-01 ENCOUNTER — E-VISIT (OUTPATIENT)
Dept: FAMILY MEDICINE CLINIC | Age: 69
End: 2024-10-01
Payer: MEDICARE

## 2024-10-01 DIAGNOSIS — B96.20 E. COLI UTI: Primary | ICD-10-CM

## 2024-10-01 DIAGNOSIS — N39.0 E. COLI UTI: Primary | ICD-10-CM

## 2024-10-01 DIAGNOSIS — R35.0 URINARY FREQUENCY: ICD-10-CM

## 2024-10-01 PROCEDURE — 99421 OL DIG E/M SVC 5-10 MIN: CPT | Performed by: FAMILY MEDICINE

## 2024-10-02 ENCOUNTER — HOSPITAL ENCOUNTER (OUTPATIENT)
Age: 69
Discharge: HOME | End: 2024-10-02
Payer: MEDICARE

## 2024-10-02 DIAGNOSIS — R35.0 URINARY FREQUENCY: ICD-10-CM

## 2024-10-02 LAB
BACTERIA URNS QL MICRO: ABNORMAL
BILIRUB UR QL STRIP: NEGATIVE
CHARACTER UR: ABNORMAL
CLARITY UR: CLEAR
COLOR UR: YELLOW
EPI CELLS #/AREA URNS HPF: ABNORMAL /HPF (ref 0–5)
GLUCOSE UR STRIP-MCNC: NEGATIVE MG/DL
HGB UR QL STRIP.AUTO: ABNORMAL
KETONES UR STRIP-MCNC: NEGATIVE MG/DL
LEUKOCYTE ESTERASE UR QL STRIP: ABNORMAL
NITRITE UR QL STRIP: POSITIVE
PH UR STRIP: 6 [PH] (ref 5–6)
PROT UR STRIP-MCNC: NEGATIVE MG/DL
RBC #/AREA URNS HPF: ABNORMAL /HPF (ref 0–4)
SP GR UR STRIP: 1.02 (ref 1.01–1.02)
UROBILINOGEN UR STRIP-ACNC: NORMAL EU/DL (ref 0–1)
WBC #/AREA URNS HPF: ABNORMAL /HPF (ref 0–4)

## 2024-10-02 PROCEDURE — 87088 URINE BACTERIA CULTURE: CPT

## 2024-10-02 PROCEDURE — 87186 SC STD MICRODIL/AGAR DIL: CPT

## 2024-10-02 PROCEDURE — 87086 URINE CULTURE/COLONY COUNT: CPT

## 2024-10-02 PROCEDURE — 81001 URINALYSIS AUTO W/SCOPE: CPT

## 2024-10-03 RX ORDER — SULFAMETHOXAZOLE/TRIMETHOPRIM 800-160 MG
1 TABLET ORAL 2 TIMES DAILY
Qty: 14 TABLET | Refills: 0 | Status: SHIPPED | OUTPATIENT
Start: 2024-10-03 | End: 2024-10-10

## 2024-10-03 NOTE — PROGRESS NOTES
7 minutes were spent reviewing pt's message, ordering UA, reviewing results & chart, responding to pt's message, and writing prescription.

## 2024-10-04 LAB
MICROORGANISM SPEC CULT: ABNORMAL
SERVICE CMNT-IMP: ABNORMAL
SPECIMEN DESCRIPTION: ABNORMAL

## 2024-10-24 ENCOUNTER — OFFICE VISIT (OUTPATIENT)
Dept: PRIMARY CARE CLINIC | Age: 69
End: 2024-10-24

## 2024-10-24 ENCOUNTER — HOSPITAL ENCOUNTER (OUTPATIENT)
Age: 69
Setting detail: SPECIMEN
Discharge: HOME | End: 2024-10-24
Payer: MEDICARE

## 2024-10-24 VITALS
DIASTOLIC BLOOD PRESSURE: 80 MMHG | WEIGHT: 209 LBS | HEART RATE: 70 BPM | TEMPERATURE: 98.3 F | SYSTOLIC BLOOD PRESSURE: 120 MMHG | OXYGEN SATURATION: 98 % | BODY MASS INDEX: 33.73 KG/M2

## 2024-10-24 DIAGNOSIS — N30.01 ACUTE CYSTITIS WITH HEMATURIA: Primary | ICD-10-CM

## 2024-10-24 DIAGNOSIS — R35.0 URINARY FREQUENCY: ICD-10-CM

## 2024-10-24 LAB
BACTERIA URNS QL MICRO: ABNORMAL
BILIRUB UR QL STRIP: NEGATIVE
CHARACTER UR: ABNORMAL
CLARITY UR: CLEAR
COLOR UR: YELLOW
CRYSTALS URNS MICRO: ABNORMAL /HPF
EPI CELLS #/AREA URNS HPF: ABNORMAL /HPF (ref 0–5)
GLUCOSE UR STRIP-MCNC: NEGATIVE MG/DL
HGB UR QL STRIP.AUTO: ABNORMAL
KETONES UR STRIP-MCNC: NEGATIVE MG/DL
LEUKOCYTE ESTERASE UR QL STRIP: ABNORMAL
NITRITE UR QL STRIP: NEGATIVE
PH UR STRIP: 6 [PH] (ref 5–6)
PROT UR STRIP-MCNC: NEGATIVE MG/DL
RBC #/AREA URNS HPF: ABNORMAL /HPF (ref 0–4)
SP GR UR STRIP: 1.02 (ref 1.01–1.02)
UROBILINOGEN UR STRIP-ACNC: NORMAL EU/DL (ref 0–1)
WBC #/AREA URNS HPF: ABNORMAL /HPF (ref 0–4)

## 2024-10-24 PROCEDURE — 87077 CULTURE AEROBIC IDENTIFY: CPT

## 2024-10-24 PROCEDURE — 81001 URINALYSIS AUTO W/SCOPE: CPT

## 2024-10-24 PROCEDURE — 87186 SC STD MICRODIL/AGAR DIL: CPT

## 2024-10-24 PROCEDURE — 87086 URINE CULTURE/COLONY COUNT: CPT

## 2024-10-24 RX ORDER — CIPROFLOXACIN 500 MG/1
500 TABLET, FILM COATED ORAL 2 TIMES DAILY
Qty: 14 TABLET | Refills: 0 | Status: SHIPPED | OUTPATIENT
Start: 2024-10-24 | End: 2024-10-31

## 2024-10-24 ASSESSMENT — PATIENT HEALTH QUESTIONNAIRE - PHQ9
SUM OF ALL RESPONSES TO PHQ QUESTIONS 1-9: 0
SUM OF ALL RESPONSES TO PHQ9 QUESTIONS 1 & 2: 0
SUM OF ALL RESPONSES TO PHQ QUESTIONS 1-9: 0
2. FEELING DOWN, DEPRESSED OR HOPELESS: NOT AT ALL
1. LITTLE INTEREST OR PLEASURE IN DOING THINGS: NOT AT ALL

## 2024-10-24 ASSESSMENT — ENCOUNTER SYMPTOMS
VOMITING: 0
NAUSEA: 0

## 2024-10-24 NOTE — PROGRESS NOTES
[facility] Walk In department of [facility]  [address] [facility] [address]  Phone: [phone]  Fax: [phone]      Ashleigh Ny  1955  MRN: [id number]  Date of visit: 10/24/2024    Chief Complaint:     Ashleigh Ny is here for c/o of Dysuria (Urinary freq)      HPI:     Ashleigh Ny is a 68 y.o. female who presents to the [facility] today for her medical conditions/complaints as noted below.    Dysuria   This is a new problem. Episode onset: 2 days. The problem occurs intermittently. The problem has been gradually worsening. The quality of the pain is described as burning. The pain is at a severity of 5/10. There has been no fever. Associated symptoms include frequency. Pertinent negatives include no chills, flank pain, hematuria, nausea, urgency or vomiting. She has tried increased fluids (cranberry pills) for the symptoms. Her past medical history is significant for recurrent UTIs.       Past Medical History:   Diagnosis Date    Breast cancer (HCC)     Cancer of right female breast (HCC)     infiltrating ductal carcinoma T10 N0 M0 ER positive    Cataract of left eye     extraction 3/13/18    Cataract of right eye     extraction 2/15/18    Depression     GERD (gastroesophageal reflux disease)     Glaucoma     Dr Hargrove    History of gastroenteritis 1994    Hospitalized for gastroenteritis and dehydration.    History of therapeutic radiation     Hyperlipidemia     Hypertension     Controlled with diet and exercise.    OA (osteoarthritis)     Seasonal allergies         No Known Allergies      Subjective:      Review of Systems   Constitutional:  Negative for chills.   Gastrointestinal:  Negative for nausea and vomiting.   Genitourinary:  Positive for dysuria and frequency. Negative for flank pain, hematuria and urgency.       Objective:     Vitals:    10/24/24 1904   BP: 120/80   Site: Left Upper Arm   Position: Sitting

## 2024-10-24 NOTE — PATIENT INSTRUCTIONS
Urine sent for culture. Will call with results.  Complete full course of antibiotics  May use AZO, ibuprofen or tylenol for pain  Increase water intake  Decrease caffeine and sugary drinks  If symptoms worsen follow up with PCP  Patient verbalized understanding and agrees with plan of care

## 2024-10-27 LAB
MICROORGANISM SPEC CULT: ABNORMAL
SPECIMEN DESCRIPTION: ABNORMAL

## 2024-10-28 ENCOUNTER — TELEPHONE (OUTPATIENT)
Dept: PRIMARY CARE CLINIC | Age: 69
End: 2024-10-28

## 2024-10-28 NOTE — TELEPHONE ENCOUNTER
----- Message from Christian Arias PA-C sent at 10/28/2024  8:04 AM EDT -----  Please call the patient and let them know their urine culture grew e coli. They are currently taking the right antibiotic for good coverage of this bacteria. They should finish the whole course.

## 2024-11-15 ENCOUNTER — HOSPITAL ENCOUNTER (OUTPATIENT)
Age: 69
Discharge: HOME | End: 2024-11-15
Payer: MEDICARE

## 2024-11-15 ENCOUNTER — OFFICE VISIT (OUTPATIENT)
Dept: PRIMARY CARE CLINIC | Age: 69
End: 2024-11-15

## 2024-11-15 VITALS
WEIGHT: 208 LBS | SYSTOLIC BLOOD PRESSURE: 128 MMHG | OXYGEN SATURATION: 99 % | BODY MASS INDEX: 33.57 KG/M2 | DIASTOLIC BLOOD PRESSURE: 74 MMHG | TEMPERATURE: 98 F | HEART RATE: 80 BPM

## 2024-11-15 DIAGNOSIS — R30.0 DYSURIA: ICD-10-CM

## 2024-11-15 DIAGNOSIS — R30.0 DYSURIA: Primary | ICD-10-CM

## 2024-11-15 LAB
BILIRUB UR QL STRIP: NEGATIVE
CLARITY UR: CLEAR
COLOR UR: YELLOW
COMMENT: NORMAL
GLUCOSE UR STRIP-MCNC: NEGATIVE MG/DL
HGB UR QL STRIP.AUTO: NEGATIVE
KETONES UR STRIP-MCNC: NEGATIVE MG/DL
LEUKOCYTE ESTERASE UR QL STRIP: NEGATIVE
NITRITE UR QL STRIP: NEGATIVE
PH UR STRIP: 5.5 [PH] (ref 5–6)
PROT UR STRIP-MCNC: NEGATIVE MG/DL
SP GR UR STRIP: 1.02 (ref 1.01–1.02)
UROBILINOGEN UR STRIP-ACNC: NORMAL EU/DL (ref 0–1)

## 2024-11-15 PROCEDURE — 81003 URINALYSIS AUTO W/O SCOPE: CPT

## 2024-11-15 ASSESSMENT — PATIENT HEALTH QUESTIONNAIRE - PHQ9
SUM OF ALL RESPONSES TO PHQ QUESTIONS 1-9: 0
SUM OF ALL RESPONSES TO PHQ9 QUESTIONS 1 & 2: 0
2. FEELING DOWN, DEPRESSED OR HOPELESS: NOT AT ALL
SUM OF ALL RESPONSES TO PHQ QUESTIONS 1-9: 0
1. LITTLE INTEREST OR PLEASURE IN DOING THINGS: NOT AT ALL

## 2024-11-15 ASSESSMENT — ENCOUNTER SYMPTOMS
ABDOMINAL PAIN: 0
COLOR CHANGE: 0

## 2024-11-15 NOTE — PROGRESS NOTES
Tenderness: There is no abdominal tenderness. There is no right CVA tenderness or left CVA tenderness.   Neurological:      Mental Status: She is alert.           Please note that this chart was generated using voice recognition Dragon dictation software.  Although every effort was made to ensure the accuracy of this automated transcription, some errors in transcription may have occurred.    Electronically signed by CONSTANCE Clark CNP on 11/15/2024 at 10:27 AM.

## 2024-12-02 ENCOUNTER — OFFICE VISIT (OUTPATIENT)
Dept: FAMILY MEDICINE CLINIC | Age: 69
End: 2024-12-02
Payer: MEDICARE

## 2024-12-02 VITALS
HEIGHT: 66 IN | TEMPERATURE: 97.3 F | HEART RATE: 62 BPM | DIASTOLIC BLOOD PRESSURE: 60 MMHG | SYSTOLIC BLOOD PRESSURE: 110 MMHG | RESPIRATION RATE: 16 BRPM | BODY MASS INDEX: 33.99 KG/M2 | WEIGHT: 211.5 LBS | OXYGEN SATURATION: 95 %

## 2024-12-02 DIAGNOSIS — Z85.3 HISTORY OF BREAST CANCER: ICD-10-CM

## 2024-12-02 DIAGNOSIS — R29.898 BILATERAL ARM WEAKNESS: ICD-10-CM

## 2024-12-02 DIAGNOSIS — M25.511 RIGHT SHOULDER PAIN, UNSPECIFIED CHRONICITY: ICD-10-CM

## 2024-12-02 DIAGNOSIS — M54.2 NECK PAIN ON RIGHT SIDE: ICD-10-CM

## 2024-12-02 DIAGNOSIS — E78.5 HYPERLIPIDEMIA, UNSPECIFIED HYPERLIPIDEMIA TYPE: Primary | ICD-10-CM

## 2024-12-02 DIAGNOSIS — F32.A DEPRESSION, UNSPECIFIED DEPRESSION TYPE: ICD-10-CM

## 2024-12-02 PROBLEM — H04.123 DRY EYES, BILATERAL: Status: RESOLVED | Noted: 2022-06-06 | Resolved: 2024-12-02

## 2024-12-02 PROCEDURE — 1160F RVW MEDS BY RX/DR IN RCRD: CPT | Performed by: FAMILY MEDICINE

## 2024-12-02 PROCEDURE — G2211 COMPLEX E/M VISIT ADD ON: HCPCS | Performed by: FAMILY MEDICINE

## 2024-12-02 PROCEDURE — 1123F ACP DISCUSS/DSCN MKR DOCD: CPT | Performed by: FAMILY MEDICINE

## 2024-12-02 PROCEDURE — 1159F MED LIST DOCD IN RCRD: CPT | Performed by: FAMILY MEDICINE

## 2024-12-02 PROCEDURE — 99214 OFFICE O/P EST MOD 30 MIN: CPT | Performed by: FAMILY MEDICINE

## 2024-12-02 NOTE — PROGRESS NOTES
Smoking status: Never    Smokeless tobacco: Never    Tobacco comments:     grew up with a heavy smoker (dad) in house   Substance Use Topics    Alcohol use: No      Current Outpatient Medications   Medication Sig Dispense Refill    atorvastatin (LIPITOR) 20 MG tablet Take 1 tablet by mouth daily 90 tablet 3    PARoxetine (PAXIL) 20 MG tablet Take 1 tablet by mouth daily 90 tablet 3    dorzolamide-timolol (COSOPT) 2-0.5 % ophthalmic solution       fenofibrate (TRICOR) 145 MG tablet Take 1 tablet by mouth daily 90 tablet 3    METAMUCIL FIBER PO Take by mouth      hydrocortisone 2.5 % cream Apply 1 Dose topically 2 times daily Apply topically 2 times daily.      brimonidine (ALPHAGAN) 0.2 % ophthalmic solution in the morning and at bedtime      Multiple Vitamins-Minerals (ICAPS AREDS 2 PO) Take 2 capsules by mouth daily      latanoprost (XALATAN) 0.005 % ophthalmic solution Place 1 drop into both eyes nightly      vitamin D (CHOLECALCIFEROL) 125 MCG (5000 UT) CAPS capsule Take 1 capsule by mouth daily      Multiple Vitamin (MULTI-VITAMIN PO) Take 1 tablet by mouth daily      calcium citrate-vitamin D (CITRACAL+D) 315-200 MG-UNIT per tablet Take 1 tablet by mouth      omeprazole (PRILOSEC) 20 MG capsule Take 1 capsule by mouth daily. 90 capsule 1    cetirizine (ZYRTEC) 10 MG tablet Take 1 tablet by mouth daily       No current facility-administered medications for this visit.     No Known Allergies    Health Maintenance   Topic Date Due    Annual Wellness Visit (Medicare Advantage)  01/01/2024    Lipids  05/21/2025    Breast cancer screen  08/27/2025    Depression Monitoring  11/15/2025    DTaP/Tdap/Td vaccine (2 - Td or Tdap) 05/09/2027    Diabetes screen  05/21/2027    Colorectal Cancer Screen  12/07/2027    Respiratory Syncytial Virus (RSV) Pregnant or age 60 yrs+ (1 - 1-dose 75+ series) 12/06/2029    DEXA (modify frequency per FRAX score)  Completed    Flu vaccine  Completed    Shingles vaccine  Completed

## 2024-12-11 ENCOUNTER — TELEMEDICINE (OUTPATIENT)
Dept: FAMILY MEDICINE CLINIC | Age: 69
End: 2024-12-11

## 2024-12-11 DIAGNOSIS — Z00.00 MEDICARE ANNUAL WELLNESS VISIT, SUBSEQUENT: Primary | ICD-10-CM

## 2024-12-11 ASSESSMENT — PATIENT HEALTH QUESTIONNAIRE - PHQ9
SUM OF ALL RESPONSES TO PHQ QUESTIONS 1-9: 0
4. FEELING TIRED OR HAVING LITTLE ENERGY: NOT AT ALL
10. IF YOU CHECKED OFF ANY PROBLEMS, HOW DIFFICULT HAVE THESE PROBLEMS MADE IT FOR YOU TO DO YOUR WORK, TAKE CARE OF THINGS AT HOME, OR GET ALONG WITH OTHER PEOPLE: NOT DIFFICULT AT ALL
9. THOUGHTS THAT YOU WOULD BE BETTER OFF DEAD, OR OF HURTING YOURSELF: NOT AT ALL
2. FEELING DOWN, DEPRESSED OR HOPELESS: NOT AT ALL
5. POOR APPETITE OR OVEREATING: NOT AT ALL
7. TROUBLE CONCENTRATING ON THINGS, SUCH AS READING THE NEWSPAPER OR WATCHING TELEVISION: NOT AT ALL
SUM OF ALL RESPONSES TO PHQ QUESTIONS 1-9: 0
SUM OF ALL RESPONSES TO PHQ QUESTIONS 1-9: 0
3. TROUBLE FALLING OR STAYING ASLEEP: NOT AT ALL
SUM OF ALL RESPONSES TO PHQ9 QUESTIONS 1 & 2: 0
1. LITTLE INTEREST OR PLEASURE IN DOING THINGS: NOT AT ALL
6. FEELING BAD ABOUT YOURSELF - OR THAT YOU ARE A FAILURE OR HAVE LET YOURSELF OR YOUR FAMILY DOWN: NOT AT ALL
8. MOVING OR SPEAKING SO SLOWLY THAT OTHER PEOPLE COULD HAVE NOTICED. OR THE OPPOSITE, BEING SO FIGETY OR RESTLESS THAT YOU HAVE BEEN MOVING AROUND A LOT MORE THAN USUAL: NOT AT ALL
SUM OF ALL RESPONSES TO PHQ QUESTIONS 1-9: 0

## 2024-12-11 NOTE — PROGRESS NOTES
Medicare Annual Wellness Visit    Ashleigh Ny is here for Medicare AWV    Assessment & Plan     Recommendations for Preventive Services Due: see orders and patient instructions/AVS.  Recommended screening schedule for the next 5-10 years is provided to the patient in written form: see Patient Instructions/AVS.     Return in 1 year (on 12/11/2025) for Medicare AWV.     Subjective       Patient's complete Health Risk Assessment and screening values have been reviewed and are found in Flowsheets. The following problems were reviewed today and where indicated follow up appointments were made and/or referrals ordered.    Positive Risk Factor Screenings with Interventions:              Inactivity:  On average, how many days per week do you engage in moderate to strenuous exercise (like a brisk walk)?: 0 days (!) Abnormal  On average, how many minutes do you engage in exercise at this level?: 0 min  Interventions:  Patient declined any further interventions or treatment     Abnormal BMI (obese):  There is no height or weight on file to calculate BMI. (!) Abnormal  Interventions:  Patient declines any further evaluation or treatment                           Objective    Patient-Reported Vitals  No data recorded             No Known Allergies  Prior to Visit Medications    Medication Sig Taking? Authorizing Provider   atorvastatin (LIPITOR) 20 MG tablet Take 1 tablet by mouth daily Yes Annetta العلي, DO   PARoxetine (PAXIL) 20 MG tablet Take 1 tablet by mouth daily Yes Annetta العلي, DO   dorzolamide-timolol (COSOPT) 2-0.5 % ophthalmic solution  Yes Margie Barton MD   fenofibrate (TRICOR) 145 MG tablet Take 1 tablet by mouth daily Yes Annetta العلي, DO   METAMUCIL FIBER PO Take by mouth Yes Margie Barton MD   hydrocortisone 2.5 % cream Apply 1 Dose topically 2 times daily Apply topically 2 times daily. Yes Margie Barton MD   brimonidine (ALPHAGAN) 0.2 % ophthalmic solution in the

## 2025-01-07 NOTE — PROGRESS NOTES
Writer scheduled mamm, prior to follow up   
extended endocrine therapy.   No evidence of recurrence. She will be followed as per the NCCN guidelines     PLAN:   I reviewed her recent lab work, imaging studies, discussed diagnosis and treatment recommendations   Recent mammogram showed no evidence of recurrence   Her DEXA scan showed osteopenia   She has completed endocrine therapy in 2021   Will get lab work today   Continue surveillance as per NCCN guidelines   Return to clinic in 1 year with mammogram prior or earlier if needed       I spent more than 35 minutes examining, evaluating, reviewing data and counseling the patient.  Greater than 50% of that time was spent face-to-face with the patient in counseling and coordinating her care.    Jimmie Palacio MD  Hematology/Oncology    
Detail Level: Detailed

## 2025-04-05 DIAGNOSIS — E78.5 HYPERLIPIDEMIA, UNSPECIFIED HYPERLIPIDEMIA TYPE: ICD-10-CM

## 2025-04-09 RX ORDER — FENOFIBRATE 145 MG/1
145 TABLET, COATED ORAL DAILY
Qty: 90 TABLET | Refills: 0 | Status: SHIPPED | OUTPATIENT
Start: 2025-04-09

## 2025-05-27 ENCOUNTER — HOSPITAL ENCOUNTER (OUTPATIENT)
Age: 70
Discharge: HOME | End: 2025-05-27
Payer: MEDICARE

## 2025-05-27 DIAGNOSIS — E55.9 VITAMIN D DEFICIENCY: ICD-10-CM

## 2025-05-27 DIAGNOSIS — E78.5 HYPERLIPIDEMIA, UNSPECIFIED HYPERLIPIDEMIA TYPE: ICD-10-CM

## 2025-05-27 LAB
25(OH)D3 SERPL-MCNC: 30.1 NG/ML (ref 30–100)
ALBUMIN SERPL-MCNC: 4.3 G/DL (ref 3.5–5.2)
ALBUMIN/GLOB SERPL: 1.7 {RATIO} (ref 1–2.5)
ALP SERPL-CCNC: 67 U/L (ref 35–104)
ALT SERPL-CCNC: 16 U/L (ref 10–35)
ANION GAP SERPL CALCULATED.3IONS-SCNC: 10 MMOL/L (ref 9–16)
AST SERPL-CCNC: 22 U/L (ref 10–35)
BASOPHILS # BLD: 0.04 K/UL (ref 0–0.2)
BASOPHILS NFR BLD: 1 % (ref 0–2)
BILIRUB SERPL-MCNC: 0.3 MG/DL (ref 0–1.2)
BUN SERPL-MCNC: 14 MG/DL (ref 8–23)
BUN/CREAT SERPL: 18 (ref 9–20)
CALCIUM SERPL-MCNC: 9.5 MG/DL (ref 8.6–10.4)
CHLORIDE SERPL-SCNC: 106 MMOL/L (ref 98–107)
CHOLEST SERPL-MCNC: 154 MG/DL (ref 0–199)
CHOLESTEROL/HDL RATIO: 4.1
CO2 SERPL-SCNC: 27 MMOL/L (ref 20–31)
CREAT SERPL-MCNC: 0.8 MG/DL (ref 0.6–0.9)
EOSINOPHIL # BLD: 0.32 K/UL (ref 0–0.44)
EOSINOPHILS RELATIVE PERCENT: 6 % (ref 1–4)
ERYTHROCYTE [DISTWIDTH] IN BLOOD BY AUTOMATED COUNT: 14.1 % (ref 11.8–14.4)
GFR, ESTIMATED: 79 ML/MIN/1.73M2
GLUCOSE SERPL-MCNC: 111 MG/DL (ref 74–99)
HCT VFR BLD AUTO: 36.8 % (ref 36.3–47.1)
HDLC SERPL-MCNC: 38 MG/DL
HGB BLD-MCNC: 11.9 G/DL (ref 11.9–15.1)
IMM GRANULOCYTES # BLD AUTO: <0.03 K/UL (ref 0–0.3)
IMM GRANULOCYTES NFR BLD: 0 %
LDLC SERPL CALC-MCNC: 78 MG/DL (ref 0–100)
LYMPHOCYTES NFR BLD: 1.78 K/UL (ref 1.1–3.7)
LYMPHOCYTES RELATIVE PERCENT: 35 % (ref 24–43)
MCH RBC QN AUTO: 28.2 PG (ref 25.2–33.5)
MCHC RBC AUTO-ENTMCNC: 32.3 G/DL (ref 25.2–33.5)
MCV RBC AUTO: 87.2 FL (ref 82.6–102.9)
MONOCYTES NFR BLD: 0.49 K/UL (ref 0.1–1.2)
MONOCYTES NFR BLD: 10 % (ref 3–12)
NEUTROPHILS NFR BLD: 48 % (ref 36–65)
NEUTS SEG NFR BLD: 2.5 K/UL (ref 1.5–8.1)
NRBC BLD-RTO: 0 PER 100 WBC
PLATELET # BLD AUTO: 269 K/UL (ref 138–453)
PMV BLD AUTO: 9.6 FL (ref 8.1–13.5)
POTASSIUM SERPL-SCNC: 3.9 MMOL/L (ref 3.7–5.3)
PROT SERPL-MCNC: 6.9 G/DL (ref 6.6–8.7)
RBC # BLD AUTO: 4.22 M/UL (ref 3.95–5.11)
SODIUM SERPL-SCNC: 143 MMOL/L (ref 136–145)
TRIGL SERPL-MCNC: 188 MG/DL
VLDLC SERPL CALC-MCNC: 38 MG/DL (ref 1–30)
WBC OTHER # BLD: 5.1 K/UL (ref 3.5–11.3)

## 2025-05-27 PROCEDURE — 80061 LIPID PANEL: CPT

## 2025-05-27 PROCEDURE — 80053 COMPREHEN METABOLIC PANEL: CPT

## 2025-05-27 PROCEDURE — 82306 VITAMIN D 25 HYDROXY: CPT

## 2025-05-27 PROCEDURE — 36415 COLL VENOUS BLD VENIPUNCTURE: CPT

## 2025-05-27 PROCEDURE — 85025 COMPLETE CBC W/AUTO DIFF WBC: CPT

## 2025-06-02 ENCOUNTER — OFFICE VISIT (OUTPATIENT)
Dept: FAMILY MEDICINE CLINIC | Age: 70
End: 2025-06-02
Payer: MEDICARE

## 2025-06-02 VITALS
HEIGHT: 66 IN | DIASTOLIC BLOOD PRESSURE: 66 MMHG | RESPIRATION RATE: 16 BRPM | HEART RATE: 81 BPM | TEMPERATURE: 96.3 F | BODY MASS INDEX: 34.33 KG/M2 | OXYGEN SATURATION: 97 % | SYSTOLIC BLOOD PRESSURE: 112 MMHG | WEIGHT: 213.6 LBS

## 2025-06-02 DIAGNOSIS — J06.9 URI, ACUTE: ICD-10-CM

## 2025-06-02 DIAGNOSIS — R73.01 ELEVATED FASTING GLUCOSE: ICD-10-CM

## 2025-06-02 DIAGNOSIS — E78.2 HYPERCHOLESTEROLEMIA WITH HYPERTRIGLYCERIDEMIA: Primary | ICD-10-CM

## 2025-06-02 DIAGNOSIS — E55.9 VITAMIN D DEFICIENCY: ICD-10-CM

## 2025-06-02 DIAGNOSIS — F32.A DEPRESSION, UNSPECIFIED DEPRESSION TYPE: ICD-10-CM

## 2025-06-02 PROCEDURE — 1159F MED LIST DOCD IN RCRD: CPT | Performed by: FAMILY MEDICINE

## 2025-06-02 PROCEDURE — 99214 OFFICE O/P EST MOD 30 MIN: CPT | Performed by: FAMILY MEDICINE

## 2025-06-02 PROCEDURE — 1123F ACP DISCUSS/DSCN MKR DOCD: CPT | Performed by: FAMILY MEDICINE

## 2025-06-02 PROCEDURE — G2211 COMPLEX E/M VISIT ADD ON: HCPCS | Performed by: FAMILY MEDICINE

## 2025-06-02 RX ORDER — PAROXETINE 20 MG/1
20 TABLET, FILM COATED ORAL DAILY
Qty: 90 TABLET | Refills: 3 | Status: SHIPPED | OUTPATIENT
Start: 2025-06-02

## 2025-06-02 RX ORDER — BENZONATATE 100 MG/1
100-200 CAPSULE ORAL 3 TIMES DAILY PRN
Qty: 30 CAPSULE | Refills: 0 | Status: SHIPPED | OUTPATIENT
Start: 2025-06-02

## 2025-06-02 RX ORDER — FENOFIBRATE 145 MG/1
145 TABLET, FILM COATED ORAL DAILY
Qty: 90 TABLET | Refills: 3 | Status: SHIPPED | OUTPATIENT
Start: 2025-06-02

## 2025-06-02 RX ORDER — ATORVASTATIN CALCIUM 20 MG/1
20 TABLET, FILM COATED ORAL DAILY
Qty: 90 TABLET | Refills: 3 | Status: SHIPPED | OUTPATIENT
Start: 2025-06-02

## 2025-06-02 SDOH — ECONOMIC STABILITY: FOOD INSECURITY: WITHIN THE PAST 12 MONTHS, THE FOOD YOU BOUGHT JUST DIDN'T LAST AND YOU DIDN'T HAVE MONEY TO GET MORE.: NEVER TRUE

## 2025-06-02 SDOH — ECONOMIC STABILITY: FOOD INSECURITY: WITHIN THE PAST 12 MONTHS, YOU WORRIED THAT YOUR FOOD WOULD RUN OUT BEFORE YOU GOT MONEY TO BUY MORE.: NEVER TRUE

## 2025-06-02 ASSESSMENT — PATIENT HEALTH QUESTIONNAIRE - PHQ9
9. THOUGHTS THAT YOU WOULD BE BETTER OFF DEAD, OR OF HURTING YOURSELF: NOT AT ALL
SUM OF ALL RESPONSES TO PHQ QUESTIONS 1-9: 2
1. LITTLE INTEREST OR PLEASURE IN DOING THINGS: NOT AT ALL
6. FEELING BAD ABOUT YOURSELF - OR THAT YOU ARE A FAILURE OR HAVE LET YOURSELF OR YOUR FAMILY DOWN: NOT AT ALL
8. MOVING OR SPEAKING SO SLOWLY THAT OTHER PEOPLE COULD HAVE NOTICED. OR THE OPPOSITE, BEING SO FIGETY OR RESTLESS THAT YOU HAVE BEEN MOVING AROUND A LOT MORE THAN USUAL: NOT AT ALL
SUM OF ALL RESPONSES TO PHQ QUESTIONS 1-9: 2
SUM OF ALL RESPONSES TO PHQ QUESTIONS 1-9: 2
5. POOR APPETITE OR OVEREATING: NOT AT ALL
3. TROUBLE FALLING OR STAYING ASLEEP: SEVERAL DAYS
4. FEELING TIRED OR HAVING LITTLE ENERGY: SEVERAL DAYS
10. IF YOU CHECKED OFF ANY PROBLEMS, HOW DIFFICULT HAVE THESE PROBLEMS MADE IT FOR YOU TO DO YOUR WORK, TAKE CARE OF THINGS AT HOME, OR GET ALONG WITH OTHER PEOPLE: NOT DIFFICULT AT ALL
2. FEELING DOWN, DEPRESSED OR HOPELESS: NOT AT ALL
SUM OF ALL RESPONSES TO PHQ QUESTIONS 1-9: 2
7. TROUBLE CONCENTRATING ON THINGS, SUCH AS READING THE NEWSPAPER OR WATCHING TELEVISION: NOT AT ALL

## 2025-06-02 NOTE — PROGRESS NOTES
supplementation at 5000 units daily.  - Due for a mammogram in 09/2025 and a colonoscopy in 2027. Tetanus vaccine is due in 2027. Up to date on all other vaccines.        Return in about 6 months (around 12/2/2025) for f/u HLD, mood.    Orders Placed This Encounter   Procedures    Comprehensive Metabolic Panel     Standing Status:   Future     Expected Date:   5/27/2026     Expiration Date:   11/27/2026    Lipid Panel     Standing Status:   Future     Expected Date:   5/27/2026     Expiration Date:   5/27/2027    Vitamin D 25 Hydroxy     Standing Status:   Future     Expected Date:   5/27/2026     Expiration Date:   5/27/2027    CBC with Auto Differential     Standing Status:   Future     Expected Date:   5/27/2026     Expiration Date:   5/27/2027     Orders Placed This Encounter   Medications    atorvastatin (LIPITOR) 20 MG tablet     Sig: Take 1 tablet by mouth daily     Dispense:  90 tablet     Refill:  3    PARoxetine (PAXIL) 20 MG tablet     Sig: Take 1 tablet by mouth daily     Dispense:  90 tablet     Refill:  3    fenofibrate (TRICOR) 145 MG tablet     Sig: Take 1 tablet by mouth daily     Dispense:  90 tablet     Refill:  3    benzonatate (TESSALON) 100 MG capsule     Sig: Take 1-2 capsules by mouth 3 times daily as needed for Cough     Dispense:  30 capsule     Refill:  0         Discussed use, benefit, and side effects of prescribed medications.  All patient questions answered.  Pt voiced understanding.  Reviewed health maintenance.    The patient (or guardian, if applicable) and other individuals in attendance with the patient were advised that Artificial Intelligence will be utilized during this visit to record, process the conversation to generate a clinical note, and support improvement of the AI technology. The patient (or guardian, if applicable) and other individuals in attendance at the appointment consented to the use of AI, including the recording.              Electronically signed by Annetta PETERSON

## 2025-09-02 ENCOUNTER — HOSPITAL ENCOUNTER (OUTPATIENT)
Dept: MAMMOGRAPHY | Age: 70
Discharge: HOME | End: 2025-09-04
Attending: INTERNAL MEDICINE
Payer: MEDICARE

## 2025-09-02 VITALS — BODY MASS INDEX: 33.89 KG/M2 | WEIGHT: 210 LBS

## 2025-09-02 DIAGNOSIS — Z12.31 SCREENING MAMMOGRAM, ENCOUNTER FOR: ICD-10-CM

## 2025-09-02 PROCEDURE — 77063 BREAST TOMOSYNTHESIS BI: CPT

## (undated) DEVICE — CANNULA SMART CAPNOLINE PLUS 02

## (undated) DEVICE — MERCY DEFIANCE ENDO KIT: Brand: MEDLINE INDUSTRIES, INC.

## (undated) DEVICE — COLONOSCOPE ENDOSCP ABSORBENT SM PEDIATRIC 104 MM VISION

## (undated) DEVICE — FORCEPS BX L240CM JAW DIA2.4MM ORNG L CAP W/ NDL DISP RAD

## (undated) DEVICE — BITE BLOCK W/VELCRO STRAP

## (undated) DEVICE — 1200CC GUARDIAN II: Brand: GUARDIAN

## (undated) DEVICE — CONNECTOR TBNG AUX H2O JET DISP FOR OLY 160/180 SER

## (undated) DEVICE — 60 ML SYRINGE REGULAR TIP: Brand: MONOJECT